# Patient Record
Sex: FEMALE | Race: OTHER | HISPANIC OR LATINO | Employment: OTHER | URBAN - METROPOLITAN AREA
[De-identification: names, ages, dates, MRNs, and addresses within clinical notes are randomized per-mention and may not be internally consistent; named-entity substitution may affect disease eponyms.]

---

## 2018-03-20 LAB
ALBUMIN SERPL BCP-MCNC: 4.7 G/DL (ref 3–5.2)
ALP SERPL-CCNC: 109 U/L (ref 43–122)
ALT SERPL W P-5'-P-CCNC: 32 U/L (ref 9–52)
ANION GAP SERPL CALCULATED.3IONS-SCNC: 13 MMOL/L (ref 5–14)
AST SERPL W P-5'-P-CCNC: 25 U/L (ref 14–36)
BILIRUB SERPL-MCNC: 0.5 MG/DL
BUN SERPL-MCNC: 24 MG/DL (ref 5–25)
CALCIUM SERPL-MCNC: 10 MG/DL (ref 8.4–10.2)
CHLORIDE SERPL-SCNC: 104 MEQ/L (ref 97–108)
CHOLEST SERPL-MCNC: 372 MG/DL
CHOLEST/HDLC SERPL: 7.9 {RATIO}
CO2 SERPL-SCNC: 25 MMOL/L (ref 22–30)
CREATINE, SERUM (HISTORICAL): 1.2 MG/DL (ref 0.6–1.2)
EGFR (HISTORICAL): 44 ML/MIN/1.73 M2
GLUCOSE FASTING (HISTORICAL): 117 MG/DL (ref 70–99)
HDLC SERPL-MCNC: 47 MG/DL
LDL/HDL RATIO (HISTORICAL): 6.2
LDLC SERPL CALC-MCNC: 290 MG/DL
POTASSIUM SERPL-SCNC: 4.9 MEQ/L (ref 3.6–5)
SODIUM SERPL-SCNC: 142 MEQ/L (ref 137–147)
TOTAL PROTEIN (HISTORICAL): 7.8 G/DL (ref 5.9–8.4)
TRIGL SERPL-MCNC: 175 MG/DL
TSH SERPL DL<=0.05 MIU/L-ACNC: 2.28 UIU/ML (ref 0.47–4.68)
VLDLC SERPL CALC-MCNC: 35 MG/DL (ref 0–40)

## 2018-08-12 ENCOUNTER — APPOINTMENT (EMERGENCY)
Dept: RADIOLOGY | Facility: HOSPITAL | Age: 78
End: 2018-08-12

## 2018-08-12 ENCOUNTER — HOSPITAL ENCOUNTER (EMERGENCY)
Facility: HOSPITAL | Age: 78
Discharge: HOME | End: 2018-08-13
Attending: EMERGENCY MEDICINE

## 2018-08-12 DIAGNOSIS — R10.9 ABDOMINAL PAIN: ICD-10-CM

## 2018-08-12 DIAGNOSIS — K42.9 UMBILICAL HERNIA: Primary | ICD-10-CM

## 2018-08-12 DIAGNOSIS — I10 HTN (HYPERTENSION): ICD-10-CM

## 2018-08-12 LAB
ALBUMIN SERPL BCP-MCNC: 4.2 G/DL (ref 3–5.2)
ALP SERPL-CCNC: 118 U/L (ref 43–122)
ALT SERPL W P-5'-P-CCNC: 28 U/L (ref 9–52)
ANION GAP SERPL CALCULATED.3IONS-SCNC: 10 MMOL/L (ref 5–14)
AST SERPL W P-5'-P-CCNC: 37 U/L (ref 14–36)
BILIRUB SERPL-MCNC: 0.3 MG/DL
BILIRUB UR QL STRIP: NEGATIVE
BUN SERPL-MCNC: 22 MG/DL (ref 5–25)
CALCIUM SERPL-MCNC: 9.5 MG/DL (ref 8.4–10.2)
CHLORIDE SERPL-SCNC: 104 MMOL/L (ref 97–108)
CLARITY UR: CLEAR
CO2 SERPL-SCNC: 25 MMOL/L (ref 22–30)
COLOR UR: NORMAL
CREAT SERPL-MCNC: 1.17 MG/DL (ref 0.6–1.2)
EOSINOPHIL # BLD AUTO: 0.2 THOUSAND/UL (ref 0–0.4)
EOSINOPHIL NFR BLD MANUAL: 2 % (ref 0–6)
ERYTHROCYTE [DISTWIDTH] IN BLOOD BY AUTOMATED COUNT: 14 %
GFR SERPL CREATININE-BSD FRML MDRD: 45 ML/MIN/1.73SQ M
GLUCOSE SERPL-MCNC: 101 MG/DL (ref 70–99)
GLUCOSE UR STRIP-MCNC: NEGATIVE MG/DL
HCT VFR BLD AUTO: 35.5 % (ref 36–46)
HGB BLD-MCNC: 12 G/DL (ref 12–16)
HGB UR QL STRIP.AUTO: NEGATIVE
KETONES UR STRIP-MCNC: NEGATIVE MG/DL
LEUKOCYTE ESTERASE UR QL STRIP: NEGATIVE
LIPASE SERPL-CCNC: 316 U/L (ref 23–300)
LYMPHOCYTES # BLD AUTO: 4.75 THOUSAND/UL (ref 0.5–4)
LYMPHOCYTES # BLD AUTO: 48 % (ref 20–50)
MCH RBC QN AUTO: 30.9 PG (ref 26–34)
MCHC RBC AUTO-ENTMCNC: 33.6 G/DL (ref 31–36)
MCV RBC AUTO: 92 FL (ref 80–100)
MONOCYTES # BLD AUTO: 0.2 THOUSAND/UL (ref 0.2–0.9)
MONOCYTES NFR BLD AUTO: 2 % (ref 1–10)
MYELOCYTES NFR BLD MANUAL: 1 % (ref 0–1)
NEUTS BAND NFR BLD MANUAL: 1 % (ref 0–8)
NEUTS SEG # BLD: 4.55 THOUSAND/UL (ref 1.8–7.8)
NEUTS SEG NFR BLD AUTO: 45 %
NITRITE UR QL STRIP: NEGATIVE
PH UR STRIP.AUTO: 5 [PH] (ref 4.5–8)
PLATELET # BLD AUTO: 336 THOUSANDS/UL (ref 150–450)
PLATELET BLD QL SMEAR: ADEQUATE
PMV BLD AUTO: 7.9 FL (ref 8.9–12.7)
POTASSIUM SERPL-SCNC: 4.9 MMOL/L (ref 3.6–5)
PROT SERPL-MCNC: 8 G/DL (ref 5.9–8.4)
PROT UR STRIP-MCNC: NEGATIVE MG/DL
RBC # BLD AUTO: 3.87 MILLION/UL (ref 4–5.2)
RBC MORPH BLD: NORMAL
SODIUM SERPL-SCNC: 139 MMOL/L (ref 137–147)
SP GR UR STRIP.AUTO: 1.01 (ref 1–1.04)
TOTAL CELLS COUNTED SPEC: 100
TROPONIN I SERPL-MCNC: <0.01 NG/ML (ref 0–0.03)
UROBILINOGEN UA: NEGATIVE MG/DL
VARIANT LYMPHS # BLD AUTO: 1 % (ref 0–0)
WBC # BLD AUTO: 9.9 THOUSAND/UL (ref 4.5–11)

## 2018-08-12 PROCEDURE — 71045 X-RAY EXAM CHEST 1 VIEW: CPT

## 2018-08-12 PROCEDURE — 96361 HYDRATE IV INFUSION ADD-ON: CPT

## 2018-08-12 PROCEDURE — 83690 ASSAY OF LIPASE: CPT | Performed by: PHYSICIAN ASSISTANT

## 2018-08-12 PROCEDURE — 85007 BL SMEAR W/DIFF WBC COUNT: CPT | Performed by: PHYSICIAN ASSISTANT

## 2018-08-12 PROCEDURE — 80053 COMPREHEN METABOLIC PANEL: CPT | Performed by: PHYSICIAN ASSISTANT

## 2018-08-12 PROCEDURE — 84484 ASSAY OF TROPONIN QUANT: CPT | Performed by: PHYSICIAN ASSISTANT

## 2018-08-12 PROCEDURE — 85027 COMPLETE CBC AUTOMATED: CPT | Performed by: PHYSICIAN ASSISTANT

## 2018-08-12 PROCEDURE — 93005 ELECTROCARDIOGRAM TRACING: CPT

## 2018-08-12 PROCEDURE — 36415 COLL VENOUS BLD VENIPUNCTURE: CPT | Performed by: PHYSICIAN ASSISTANT

## 2018-08-12 PROCEDURE — 81003 URINALYSIS AUTO W/O SCOPE: CPT | Performed by: PHYSICIAN ASSISTANT

## 2018-08-12 RX ORDER — ACETAMINOPHEN 325 MG/1
650 TABLET ORAL ONCE
Status: COMPLETED | OUTPATIENT
Start: 2018-08-12 | End: 2018-08-12

## 2018-08-12 RX ORDER — SODIUM CHLORIDE 9 MG/ML
100 INJECTION, SOLUTION INTRAVENOUS CONTINUOUS
Status: DISCONTINUED | OUTPATIENT
Start: 2018-08-12 | End: 2018-08-13 | Stop reason: HOSPADM

## 2018-08-12 RX ORDER — AMLODIPINE BESYLATE 10 MG/1
10 TABLET ORAL DAILY
COMMUNITY
End: 2019-10-11 | Stop reason: SDUPTHER

## 2018-08-12 RX ORDER — SERTRALINE HYDROCHLORIDE 25 MG/1
50 TABLET, FILM COATED ORAL DAILY
COMMUNITY
End: 2019-02-05

## 2018-08-12 RX ORDER — LEVOTHYROXINE SODIUM 0.03 MG/1
25 TABLET ORAL DAILY
COMMUNITY
End: 2019-02-05

## 2018-08-12 RX ORDER — ATORVASTATIN CALCIUM 40 MG/1
40 TABLET, FILM COATED ORAL DAILY
COMMUNITY
End: 2019-02-05

## 2018-08-12 RX ORDER — ASPIRIN 81 MG/1
81 TABLET, CHEWABLE ORAL DAILY
COMMUNITY
End: 2019-02-05

## 2018-08-12 RX ORDER — ACETAMINOPHEN 325 MG/1
TABLET ORAL
Status: DISCONTINUED
Start: 2018-08-12 | End: 2018-08-13 | Stop reason: HOSPADM

## 2018-08-12 RX ORDER — LISINOPRIL 20 MG/1
20 TABLET ORAL DAILY
COMMUNITY
End: 2019-02-05

## 2018-08-12 RX ORDER — FAMOTIDINE 20 MG/1
20 TABLET, FILM COATED ORAL DAILY
COMMUNITY
End: 2019-02-05

## 2018-08-12 RX ADMIN — ACETAMINOPHEN 650 MG: 325 TABLET ORAL at 22:18

## 2018-08-12 RX ADMIN — SODIUM CHLORIDE 100 ML/HR: 9 INJECTION, SOLUTION INTRAVENOUS at 22:13

## 2018-08-13 ENCOUNTER — APPOINTMENT (EMERGENCY)
Dept: CT IMAGING | Facility: HOSPITAL | Age: 78
End: 2018-08-13

## 2018-08-13 VITALS
DIASTOLIC BLOOD PRESSURE: 87 MMHG | TEMPERATURE: 97.8 F | OXYGEN SATURATION: 99 % | HEART RATE: 80 BPM | RESPIRATION RATE: 18 BRPM | WEIGHT: 147.93 LBS | SYSTOLIC BLOOD PRESSURE: 185 MMHG

## 2018-08-13 PROCEDURE — 74176 CT ABD & PELVIS W/O CONTRAST: CPT

## 2018-08-13 PROCEDURE — 96374 THER/PROPH/DIAG INJ IV PUSH: CPT

## 2018-08-13 PROCEDURE — 96361 HYDRATE IV INFUSION ADD-ON: CPT

## 2018-08-13 PROCEDURE — 99285 EMERGENCY DEPT VISIT HI MDM: CPT

## 2018-08-13 RX ORDER — FENTANYL CITRATE 50 UG/ML
25 INJECTION, SOLUTION INTRAMUSCULAR; INTRAVENOUS ONCE
Status: COMPLETED | OUTPATIENT
Start: 2018-08-13 | End: 2018-08-13

## 2018-08-13 RX ORDER — ACETAMINOPHEN AND CODEINE PHOSPHATE 300; 30 MG/1; MG/1
1-2 TABLET ORAL EVERY 6 HOURS PRN
Qty: 15 TABLET | Refills: 0 | Status: SHIPPED | OUTPATIENT
Start: 2018-08-13 | End: 2018-08-18

## 2018-08-13 RX ORDER — OXYCODONE HYDROCHLORIDE AND ACETAMINOPHEN 5; 325 MG/1; MG/1
TABLET ORAL
Status: DISCONTINUED
Start: 2018-08-13 | End: 2018-08-13 | Stop reason: HOSPADM

## 2018-08-13 RX ORDER — OXYCODONE HYDROCHLORIDE AND ACETAMINOPHEN 5; 325 MG/1; MG/1
1 TABLET ORAL ONCE
Status: COMPLETED | OUTPATIENT
Start: 2018-08-13 | End: 2018-08-13

## 2018-08-13 RX ORDER — FENTANYL CITRATE 50 UG/ML
INJECTION, SOLUTION INTRAMUSCULAR; INTRAVENOUS
Status: DISCONTINUED
Start: 2018-08-13 | End: 2018-08-13 | Stop reason: HOSPADM

## 2018-08-13 RX ADMIN — OXYCODONE HYDROCHLORIDE AND ACETAMINOPHEN 1 TABLET: 5; 325 TABLET ORAL at 01:16

## 2018-08-13 RX ADMIN — FENTANYL CITRATE 25 MCG: 50 INJECTION INTRAMUSCULAR; INTRAVENOUS at 00:23

## 2018-08-13 NOTE — DISCHARGE INSTRUCTIONS
Take medicine for pain every 6-8 hours  Call Dr Diego Roam 1st thing morning  If you develop any fevers, worsening abdominal pain or cannot tolerate p  o  call 911 or return to the ER  Also return if there are any other concerning symptoms  Make sure you take stool softeners while taking narcotic      Dolor abdominal   CUIDADO AMBULATORIO:   Dolor abdominal  puede ser sordo, molesto o lisha  Usted puede sentir dolor localizado en castro bon área del abdomen o en todo el abdomen  El dolor puede ser causado por castro afección kenneth estreñimiento, sensibilidad o intoxicación alimentaria, infección o castro obstrucción  Asimismo, el dolor abdominal puede deberse a castro hernia, apendicitis o [place]arnest  Las enfermedades del hígado, la vesícula o el riñón también pueden causar dolor abdominal  La causa del dolor abdominal puede ser desconocida  Busque atención médica de inmediato si:   · Usted comienza a sentir un dolor en el pecho o dificultad para respirar que antes no sentía  · Usted siente un dolor con pulsaciones en la parte superior del abdomen o en la parte inferior de la espalda que de repente se vuelve paulo  · El dolor se localiza en la parte inferior derecha del abdomen y [place] cuando se [place]  · Usted tiene fiebre por encima de los 100 4 °F (38 °C) o escalofríos  · Usted tiene vómitos y no puede retener líquidos ni alimentos en el estómago  · El dolor no mejora o empeora en las próximas 8 a 12 horas  · Usted nota robert en etienne vómito o heces, o éstas tienen un aspecto negruzco y alquitranado  · Etienne piel o las partes alisia de wilber ojos se vuelven amarillentas  · Si usted es castro yash y presenta abundante sangrado vaginal que no es etienne menstruación  Pregúntele a etienne Revere Savers vitaminas y minerales son adecuados para usted  · Usted siente dolor en la parte inferior de la espalda  · Usted es Vance Majors y tiene dolor en los testículos  · Siente dolor al [place]       · Usted tiene preguntas o inquietudes acerca de hawkins condición o cuidado  El tratamiento para el dolor abdominal  puede llegar a incluir medicamentos para calmar hawkins estómago, prevenir el vómito o disminuir el dolor  Acuda a gela consultas de control con hawkins médico según le indicaron  Anote gela preguntas para que se acuerde de hacerlas ashlie gela visitas  © 2017 [facility] Information is for End User's use only and may not be sold, redistributed or otherwise used for commercial purposes  All illustrations and images included in CareNotes® are the copyrighted property of A D A M , Inc  or Cooper Can  Esta información es sólo para uso en educación  Hawkins intención no es darle un consejo médico sobre enfermedades o tratamientos  Colsulte con hawkins Ochoa Cranker farmacéutico antes de seguir cualquier régimen médico para saber si es seguro y efectivo para usted  Dolor abdominal lisha   CUIDADO AMBULATORIO:   El dolor abdominal lisha  generalmente comienza de repente y empeora rápidamente  Busque atención médica de inmediato si:   · Usted no puede dejar de vomitar o vomita robert  · Usted tiene [employer] evacuaciones intestinales o estas tienen un aspecto alquitranado  · Usted tiene sangrado por hawkins recto  · El tamaño del abdomen es más poli de lo normal y se siente pooja y [place] doloroso  · Usted tiene dolor abdominal intenso  · Usted kenia de tener flatulencias y evacuaciones intestinales  · Usted se siente mareado, débil o tiene sensación de [place]  Pregúntele a hawkins Taylor Rumps vitaminas y minerales son adecuados para usted  · Usted tiene fiebre  · Tiene nuevos signos y síntomas  · Gela síntomas no mejoran con el tratamiento  · Usted tiene preguntas o inquietudes acerca de hawkins condición o cuidado    El tratamiento para el dolor abdominal lisha  podría depender de la causa del dolor abdominal  Es posible que usted necesite alguno de los siguientes:  · Rosanna, estos pueden administrarse para disminuir el dolor, tratar castro infección y [place] wilber síntomas, tales kenneth el estreñimiento  · Cirugía  puede necesitarse para tratar causas graves del dolor abdominal  Los ejemplos incluyen cirugías para tratar castro apendicitis o castro obstrucción en los intestinos  [place] de etienne síntomas:   · La aplicación de calor  sobre el abdomen de 20 a 30 minutos cada 2 horas por los [employer] indiquen  El calor ayuda a disminuir el dolor y los espasmos musculares  · Controle etienne estrés  El estrés puede causar dolor abdominal  Etienne médico puede recomendarle técnicas de relajación y ejercicios de respiración profunda para ayudar a disminuir el estrés  Etienne médico puede recomendarle que hable con alguien sobre etienne estrés o ansiedad, kenneth un consejero o un amigo de [place]  Duerma lo suficiente y realice ejercicio regularmente  · Limite o no consuma bebidas alcohólicas  El alcohol puede empeorar el dolor abdominal  Pregunte a etienne médico si usted puede leanne alcohol  Pregunte cuanto es la cantidad ham para usted leanne  · No fume  La nicotina y otros químicos en los cigarrillos pueden dañarle el esófago y [place]  Pida información a etienne médico si usted actualmente fuma y necesita ayuda para dejar de fumar  Los cigarrillos electrónicos o tabaco sin humo todavía contienen nicotina  Consulte con etienne médico antes de [employer]  Realice cambios en los alimentos que consume según se le indique:  No coma alimentos que causan dolor abdominal u otros síntomas  Ingiera comidas pequeñas, más a menudo  · Coma más alimentos ricos en fibra si tiene estreñimiento  Los alimentos altos en fibra incluyen frutas, verduras, alimentos de grano integral y legumbres  · No coma alimentos que causan gas si tiene distensión  Por ejemplo, brócoli, col y coliflor  No amy gaseosas o bebidas carbonadas, ya que también pueden provocarle gases       · No consuma alimentos o bebidas que contienen sorbitol o fructosa si tiene diarrea y distensión  Danita Savant son jugos de frutas, dulces, mermeladas y gomas de mascar sin azúcar  · No coma alimentos altos en grasas, kenneth comidas fritas, hamburguesas con queso, salchichas y postres  · Limite o no tome cafeína  La cafeína [employer], kenneth la acidez o las náuseas  · Roxanne suficientes líquidos para evitar la deshidratación causada por la diarrea o los vómitos  Pregunte a hawkins médico sobre la cantidad de líquido que necesita leanne todos los días y cuáles le recomienda  Acuda a wilber consultas de control con hawkins médico según le indicaron  Anote wilber preguntas para que se acuerde de hacerlas ashlie wilber visitas  © 2017 [address] Information is for End User's use only and may not be sold, redistributed or otherwise used for commercial purposes  All illustrations and images included in CareNotes® are the copyrighted property of A D A M , Inc  or Cooper Can  Esta información es sólo para uso en educación  Hawkins intención no es darle un consejo médico sobre enfermedades o tratamientos  Colsulte con hawkins [place] farmacéutico antes de seguir cualquier régimen médico para saber si es seguro y efectivo para usted  Hipertensión crónica   LO QUE NECESITA SABER:   La hipertensión es la presión arterial maylin  La presión arterial es la fuerza que ejerce la robert contra las tabares de las arterias  La presión arterial normal debería estar a menos de 120/80  La pre-hipertensión estaría entre 120/80 y 139/ 80  La presión arterial maylin estaría a 140/90 o más maylin  La hipertensión causa que hawkins presión arterial se eleve tanto que hawkins corazón se ve forzado a trabajar [employer] de lo normal  [place] puede dañar hawkins corazón  La hipertensión crónica es castro condición de maddi plazo que usted puede controlar con un estilo de femi franklin o con medicamentos   La presión controlada ayuda a proteger wilber Christiano Kaur etienne corazón, pulmones, cerebro, y riñones  INSTRUCCIONES SOBRE EL JAYLENE HOSPITALARIA:   Llame al 911 en mj de presentar lo siguiente:   · Usted tiene malestar en el pecho que se siente kenneth estrujamiento, presión, [place] Guy o dolor  · Usted se siente confundido o tiene dificultad para hablar  · Repentinamente se siente aturdido o con dificultad para respirar  · Usted tiene dolor o [employer] espalda, [place], [place], abdomen o Kendall Glow  Busque atención médica de inmediato si:   · Usted tiene un jorge dolor de mary o pérdida de la visión  · Usted tiene debilidad en un brazo o en castro pierna  Pregúntele a etienne Yady Emmet vitaminas y minerales son adecuados para usted  · Usted se siente mareado, confundido, somnoliento o kenneth si se fuera a desmayar  · Usted se ha tomado etienne medicamento para la presión arterial martha etienne presión arterial todavía está más jaylene de lo que le indicó etienne médico     · Usted tiene preguntas o inquietudes acerca de etienne condición o cuidado  Medicamentos:  Es posible que usted necesite alguno de los siguientes:  · Medicamento  podría usarse para ayudar a disminuir la presión arterial  Es posible que necesite más de un tipo de [place]  [place] el medicamento exactamente kenneth indicado  · Diuréticos  ayudan a eliminar el exceso de líquido que se acumula en el organismo  Redan contribuirá a bajar etienne presión arterial  Es posible que orine más seguido mientras jorge l paulette medicamento  · Los medicamentos para el colesterol  ayudan a bajar los niveles de [place]  Un nivel bajo de colesterol ayuda a prevenir enfermedades cardíacas y facilita el control de la presión arterial      · [place] wilber medicamentos kenneth se le haya indicado  Consulte con etienne médico si usted ayden que etienne medicamento no le está ayudando o si presenta efectos secundarios  Infórmele si es alérgico a cualquier medicamento   Mantenga castro lista actualizada de los [place], las vitaminas y los productos herbales que jorge l  Incluya los siguientes datos de los medicamentos: cantidad, frecuencia y motivo de administración  Traiga con usted la lista o los envases de la píldoras a wilber citas de seguimiento  Lleve la lista de los medicamentos con usted en mj de castro emergencia  Acuda a wilber consultas de control con etienne médico según le indicaron  Usted necesitará regresar para medir etienne presión arterial y realizar otros exámenes de laboratorio  Anote wilber preguntas para que se acuerde de hacerlas ashlie wilber visitas  Controle la hipertensión crónica:  Hable con etienne médico sobre las siguientes recomendaciones y otras formas de controlar la hipertensión:  · Tómese la presión arterial en etienne casa  Siéntese y descanse por 5 minutos antes de tomarse la presión arterial  Extienda etienne brazo y apóyelo en castro superficie plana  Etienne brazo debe estar a la misma altura que etienne corazón  Siga las instrucciones que vienen con el monitor para la presión arterial o tensiómetro  Si es posible tome por lo menos 2 lecturas de la presión cada vez  Tómese la presión arterial por lo [employer] al día a la misma hora todos los días, castro en la mañana y la otra en la noche  Mantenga un registro de las lecturas de etienne presión arterial y llévelo consigo a wilber consultas  Pregúntele a etienne médico cuál debería ser etienne presión arterial            · Limite el sodio (la sal) kenneth se le haya indicado  Demasiado sodio puede afectar el equilibrio de líquidos  Revise las etiquetas para buscar alimentos bajos en sodio o sin sal agregada  Algunos alimentos bajos en sodio utilizan sales de potasio para añadir sabor  Demasiado potasio también puede causar problemas de [place]  Etienne médico le dirá qué cantidad de sodio y potasio es ham para el consumo en un día  Él puede recomendarle que limite el sodio a 2,300 mg al día             · Siga el plan de comidas recomendado por etienne médico   Un dietista o médico puede darle más información sobre planes de bajo contenido de sodio o [place] plan de alimentación DASH (enfoques dietéticos para detener la hipertensión)  El plan DASH es bajo en sodio, grasas saturadas y grasa total  Es alto en potasio, calcio y Kacy  · Ejercítese para mantener un peso saludable  Realice actividad física por lo menos 30 minutos al día, la mayoría de los días de la [place]  [place] ayudará a bajar hawkins presión arterial  Pida más información acerca de un plan de ejercicio adecuado para usted  · [facility] estrés  [place] podría ayudarlo a bajar hawkins presión arterial  Aprenda sobre formas de relajarse, kenneth respiración profunda o escuchar música  · Limite el consumo de alcohol  Las mujeres deberían limitar el consumo de alcohol a 1 bebida por día  Los hombres deberían limitar el consumo de alcohol a 2 tragos al día  Un trago equivale a 12 onzas de cerveza, 5 onzas de vino o 1 onza y ½ de licor  · No fume  La nicotina y otros químicos en los cigarrillos y cigarros pueden aumentar hawkins presión arterial y también pueden provocar daño al pulmón  Pida información a hawkins médico si usted actualmente fuma y necesita ayuda para dejar de fumar  Los cigarrillos electrónicos o tabaco sin humo todavía contienen nicotina  Consulte con hawkins médico antes de [employer]  © 2017 [facility] Information is for End User's use only and may not be sold, redistributed or otherwise used for commercial purposes  All illustrations and images included in CareNotes® are the copyrighted property of A D A M , Inc  or Cooper Can  Esta información es sólo para uso en educación  Hawkins intención no es darle un consejo médico sobre enfermedades o tratamientos  Colsulte con hawkins Ochoa Cranker farmacéutico antes de seguir cualquier régimen médico para saber si es seguro y efectivo para usted  Umbilical Hernia   WHAT YOU NEED TO KNOW:   An umbilical hernia is a bulge through the abdominal muscles in the area of the navel (belly button)   The hernia may contain tissue from the abdomen, part of an organ (such as the intestine), or fluid  Umbilical hernias usually happen because of a hole or a weak area in the muscles of the abdominal wall  DISCHARGE INSTRUCTIONS:   Medicines:   · Ibuprofen or acetaminophen:  These medicines decrease pain  They are available without a doctor's order  Ask your healthcare provider which medicine is right for you  Ask how much to take and how often to take it  Follow directions  These medicines can cause stomach bleeding if not taken correctly  Ibuprofen can cause kidney damage  Do not take ibuprofen if you have kidney disease, an ulcer, or allergies to aspirin  Acetaminophen can cause liver damage  Do not drink alcohol if you take acetaminophen  · Take your medicine as directed  Contact your healthcare provider if you think your medicine is not helping or if you have side effects  Tell him or her if you are allergic to any medicine  Keep a list of the medicines, vitamins, and herbs you take  Include the amounts, and when and why you take them  Bring the list or the pill bottles to follow-up visits  Carry your medicine list with you in case of an emergency  Follow up with your healthcare provider as directed:  Write down your questions so you remember to ask them during your visits  Self care:   · Activity:  Avoid lifting, bending, and straining  Try not to stand for long periods of time  If you have to cough, try to cough gently  Support the hernia by holding your hand or a pillow over it when coughing  Ask your if it is okay for you to have sexual intercourse  · Prevent constipation:  Straining to have a bowel movement may make your hernia worse  Eat foods that are high in fiber and drink at least 8 glasses of water a day  A high-fiber diet includes whole grains, bran, cereals, and uncooked fruit and vegetables  Walking or other exercise can also help   Your healthcare provider may give you fiber medicine or a stool softener to help make your bowel movements softer and more regular  Do not use an enema or a laxative unless your healthcare provider says it is okay  · What to wear:  Do not wear anything tight over your hernia  Ask your healthcare provider if you should wear a support belt or girdle to keep the hernia in place  · Ask your healthcare provider how to reduce your hernia:  Sometimes your hernia will slip back into your abdomen if you lie flat for a while  Ask your healthcare provider if you should try to gently push your hernia back into place if lying flat does not work  If your hernia does not slide back into your abdomen easily, stop pushing on it and call your healthcare provider  Do not try to push the hernia back into place if it is painful or tender  Contact your healthcare provider if:   · You have nausea or vomiting  · You cannot gently push your hernia back into your abdomen  (Do this only if your healthcare provider has shown you how to do it )     · You are constipated or have blood in your bowel movements  · Your hernia is getting bigger  · You have questions or concerns about your condition or care  Seek care immediately or call 911 if:   · You have a fever  · Your hernia is stuck outside your abdomen and is painful, swollen, or feels hard  · You completely stop having bowel movements and stop passing gas  · Your abdominal pain is bad or getting worse  © 2017 [address] Information is for End User's use only and may not be sold, redistributed or otherwise used for commercial purposes  All illustrations and images included in CareNotes® are the copyrighted property of A D A M , Inc  or Cooper Can  The above information is an  only  It is not intended as medical advice for individual conditions or treatments  Talk to your doctor, nurse or pharmacist before following any medical regimen to see if it is safe and effective for you

## 2018-08-13 NOTE — ED PROVIDER NOTES
Patient is a 16-year-old female signed out to me by Marleny blankenship pending CT  Patient was coming in with left-sided back pain as well as left lower quadrant pain  Reviewed labs as well as urine which are stable  On my exam patient has tenderness to the left lower quadrant  She has no rebound or guarding  She has no peritoneal signs  She is alert oriented x3, Croatian-speaking only  Maintaining airway maintaining secretions  Patient moves upper and lower extremities independently  12:49 AM  On exam patient does have tenderness to the umbilical region  No identified protruding hernia  Use of  in room patient agreeable  Even after fentanyl patient with continued mild pain  She never had hernia surgery repair  Will discuss with surgeon on-call  12:51 AM  Discussed with Dr Virgen Li and reviewed CT, labs, and physical exam   He states that there is nothing there on exam sides tenderness she can follow up as an outpatient    Patient and family updated in room  Will give 1st dose of pain medications here  They are instructed to call 1st thing in the morning for follow-up  Return to ER instructions given as well  Patient does have elevated blood pressures which she states she has been taking her medications for  She has no evidence of end-organ damage, neuro intact  Will discharge home with return to ER instructions follow up as well as follow up with PCP     1:20 AM  Patient believe she was having surgery tomorrow  Rediscussed with patient and friend who translated, she is not having surgery tomorrow she has to call for an appointment for an evaluation with the surgeon I spoke with  Patient is able to speak in full sentences in no pain  Her abdomen is soft nondistended she does have mild tenderness to the umbilical region; however, is non peritoneal at this time  Patient is alert oriented x3, no fevers, leukocytosis  No peritoneal signs   BP improved     Portions of the record may have been created with voice recognition software  Occasional wrong word or "sound a like" substitutions may have occurred due to the inherent limitations of voice recognition software  Read the chart carefully and recognize, using context, where substitutions have occurred         [address] Dr John Morris, DO  08/13/18 1684

## 2018-08-13 NOTE — ED NOTES
Pt  C/o continuing pain and ELÍAS Ruiz PA-C made aware of same     Riana Record, CHINYERE  08/12/18 1056

## 2018-08-13 NOTE — ED PROVIDER NOTES
History  Chief Complaint   Patient presents with    Abdominal Pain     Abdominal and back pain for a week  History provided by:  Patient   used: Yes    Medical Problem   Location:  Pt with 1 week left lower quadrant and left flank pain and left back pain   Severity:  Moderate  Onset quality:  Gradual  Duration:  1 week  Timing:  Constant  Progression:  Unchanged  Chronicity:  New  Associated symptoms: abdominal pain    Associated symptoms: no chest pain, no congestion, no cough, no diarrhea, no ear pain, no fatigue, no fever, no headaches, no loss of consciousness, no myalgias, no nausea, no rash, no rhinorrhea, no shortness of breath, no sore throat, no vomiting and no wheezing        Prior to Admission Medications   Prescriptions Last Dose Informant Patient Reported? Taking? amLODIPine (NORVASC) 10 mg tablet   Yes Yes   Sig: Take 10 mg by mouth daily   aspirin 81 mg chewable tablet   Yes Yes   Sig: Chew 81 mg daily   atorvastatin (LIPITOR) 40 mg tablet   Yes Yes   Sig: Take 40 mg by mouth daily   famotidine (PEPCID) 20 mg tablet   Yes Yes   Sig: Take 20 mg by mouth daily   levothyroxine 25 mcg tablet   Yes Yes   Sig: Take 25 mcg by mouth daily   lisinopril (ZESTRIL) 20 mg tablet   Yes Yes   Sig: Take 20 mg by mouth daily   metFORMIN (GLUCOPHAGE) 500 mg tablet   Yes Yes   Sig: Take 500 mg by mouth 2 (two) times a day with meals   sertraline (ZOLOFT) 25 mg tablet   Yes Yes   Sig: Take 50 mg by mouth daily      Facility-Administered Medications: None       Past Medical History:   Diagnosis Date    Heart abnormality     Hypertension     Renal disorder        Past Surgical History:   Procedure Laterality Date     SECTION         History reviewed  No pertinent family history  I have reviewed and agree with the history as documented      Social History   Substance Use Topics    Smoking status: Never Smoker    Smokeless tobacco: Never Used    Alcohol use No        Review of Systems   Constitutional: Negative  Negative for fatigue and fever  HENT: Negative  Negative for congestion, ear pain, rhinorrhea and sore throat  Eyes: Negative  Respiratory: Negative  Negative for cough, shortness of breath and wheezing  Cardiovascular: Negative  Negative for chest pain  Gastrointestinal: Positive for abdominal pain  Negative for diarrhea, nausea and vomiting  Endocrine: Negative  Genitourinary: Negative  Musculoskeletal: Negative  Negative for myalgias  Skin: Negative  Negative for rash  Allergic/Immunologic: Negative  Neurological: Negative  Negative for loss of consciousness and headaches  Hematological: Negative  Psychiatric/Behavioral: Negative  All other systems reviewed and are negative  Physical Exam  Physical Exam   Constitutional: She appears well-developed and well-nourished  HENT:   Head: Normocephalic and atraumatic  Right Ear: External ear normal    Left Ear: External ear normal    Nose: Nose normal    Mouth/Throat: Oropharynx is clear and moist    Eyes: Conjunctivae and EOM are normal  Pupils are equal, round, and reactive to light  Neck: Normal range of motion  Neck supple  Cardiovascular: Normal rate, regular rhythm and normal heart sounds  Pulmonary/Chest: Effort normal and breath sounds normal    Abdominal: Soft  Bowel sounds are normal    llq and left flank pain    No grding no rbd      Genitourinary: Vagina normal and uterus normal    Musculoskeletal: Normal range of motion  Left and right upper  to palp    Neurological: She is alert  Skin: Skin is warm  Psychiatric: She has a normal mood and affect  Her behavior is normal  Judgment and thought content normal    Nursing note and vitals reviewed        Vital Signs  ED Triage Vitals   Temperature Pulse Respirations Blood Pressure SpO2   08/12/18 2139 08/12/18 2139 08/12/18 2139 08/12/18 2139 08/12/18 2139   97 8 °F (36 6 °C) 64 20 (!) 218/100 95 % Temp Source Heart Rate Source Patient Position - Orthostatic VS BP Location FiO2 (%)   08/12/18 2139 08/12/18 2139 08/12/18 2139 08/12/18 2139 --   Temporal Monitor Sitting Right arm       Pain Score       08/12/18 2204       Worst Possible Pain           Vitals:    08/12/18 2204 08/12/18 2205 08/12/18 2230 08/12/18 2300   BP: (!) 190/106 (!) 197/90 167/87 (!) 198/88   Pulse: 58 57 56 58   Patient Position - Orthostatic VS: Lying Lying Lying Lying       Visual Acuity      ED Medications  Medications   sodium chloride 0 9 % infusion (100 mL/hr Intravenous New Bag 8/12/18 2213)   acetaminophen (TYLENOL) tablet 650 mg (650 mg Oral Given 8/12/18 2218)       Diagnostic Studies  Results Reviewed     Procedure Component Value Units Date/Time    Troponin I [[id number]]  (Normal) Collected:  08/12/18 2214    Lab Status:  Final result Specimen:  Blood from Arm, Right Updated:  08/12/18 2257     Troponin I <0 01 ng/mL     Lipase [[id number]]  (Abnormal) Collected:  08/12/18 2214    Lab Status:  Final result Specimen:  Blood from Arm, Right Updated:  08/12/18 2250     Lipase 316 (H) u/L     Comprehensive metabolic panel [[id number]]  (Abnormal) Collected:  08/12/18 2214    Lab Status:  Final result Specimen:  Blood from Arm, Right Updated:  08/12/18 2250     Sodium 139 mmol/L      Potassium 4 9 mmol/L      Chloride 104 mmol/L      CO2 25 mmol/L      Anion Gap 10 mmol/L      BUN 22 mg/dL      Creatinine 1 17 mg/dL      Glucose 101 (H) mg/dL      Calcium 9 5 mg/dL      AST 37 (H) U/L      ALT 28 U/L      Alkaline Phosphatase 118 U/L      Total Protein 8 0 g/dL      Albumin 4 2 g/dL      Total Bilirubin 0 30 mg/dL      eGFR 45 (L) ml/min/1 73sq m     Narrative:         National Kidney Disease Education Program recommendations are as follows:  GFR calculation is accurate only with a steady state creatinine  Chronic Kidney disease less than 60 ml/min/1 73 sq  meters  Kidney failure less than 15 ml/min/1 73 sq  meters      UA w Reflex to Microscopic w Reflex to Culture [[id number]]  (Normal) Collected:  08/12/18 2214    Lab Status:  Final result Specimen:  Urine from Urine, Clean Catch Updated:  08/12/18 2244     Color, UA Straw     Clarity, UA Clear     Specific Gravity, UA 1 010     pH, UA 5 0     Leukocytes, UA Negative     Nitrite, UA Negative     Protein, UA Negative mg/dl      Glucose, UA Negative mg/dl      Ketones, UA Negative mg/dl      Bilirubin, UA Negative     Blood, UA Negative     UROBILINOGEN UA Negative mg/dL     CBC and differential [[id number]]  (Abnormal) Collected:  08/12/18 2214    Lab Status:  Final result Specimen:  Blood from Arm, Right Updated:  08/12/18 2239     WBC 9 90 Thousand/uL      RBC 3 87 (L) Million/uL      Hemoglobin 12 0 g/dL      Hematocrit 35 5 (L) %      MCV 92 fL      MCH 30 9 pg      MCHC 33 6 g/dL      RDW 14 0 %      MPV 7 9 (L) fL      Platelets 626 Thousands/uL                  XR chest 1 view portable    (Results Pending)   CT abdomen pelvis wo contrast    (Results Pending)              Procedures  Procedures       Phone Contacts  ED Phone Contact    ED Course  ED Course as of Aug 12 2350   Sun Aug 12, 2018   2311 Creatinine: 1 17                               MDM  CritCare Time    Disposition  Final diagnoses:   None     ED Disposition     None      Follow-up Information    None         Patient's Medications   Discharge Prescriptions    No medications on file     No discharge procedures on file      ED Provider  Electronically Signed by           Tabatha Harmon PA-C  08/12/18 3732

## 2018-08-14 ENCOUNTER — HOSPITAL ENCOUNTER (EMERGENCY)
Facility: HOSPITAL | Age: 78
Discharge: HOME | End: 2018-08-14
Attending: EMERGENCY MEDICINE | Admitting: EMERGENCY MEDICINE

## 2018-08-14 VITALS
SYSTOLIC BLOOD PRESSURE: 186 MMHG | OXYGEN SATURATION: 95 % | HEART RATE: 64 BPM | TEMPERATURE: 97.2 F | RESPIRATION RATE: 19 BRPM | DIASTOLIC BLOOD PRESSURE: 81 MMHG | WEIGHT: 144.18 LBS

## 2018-08-14 DIAGNOSIS — M54.16 LUMBAR RADICULOPATHY, ACUTE: Primary | ICD-10-CM

## 2018-08-14 LAB
ALBUMIN SERPL BCP-MCNC: 4.1 G/DL (ref 3–5.2)
ALP SERPL-CCNC: 110 U/L (ref 43–122)
ALT SERPL W P-5'-P-CCNC: 31 U/L (ref 9–52)
ANION GAP SERPL CALCULATED.3IONS-SCNC: 10 MMOL/L (ref 5–14)
AST SERPL W P-5'-P-CCNC: 30 U/L (ref 14–36)
ATRIAL RATE: 55 BPM
BACTERIA UR QL AUTO: ABNORMAL /HPF
BASOPHILS # BLD AUTO: 0 THOUSANDS/ΜL (ref 0–0.1)
BASOPHILS NFR BLD AUTO: 1 % (ref 0–1)
BILIRUB SERPL-MCNC: 0.5 MG/DL
BILIRUB UR QL STRIP: NEGATIVE
BUN SERPL-MCNC: 20 MG/DL (ref 5–25)
CALCIUM SERPL-MCNC: 9.3 MG/DL (ref 8.4–10.2)
CHLORIDE SERPL-SCNC: 104 MMOL/L (ref 97–108)
CLARITY UR: CLEAR
CO2 SERPL-SCNC: 25 MMOL/L (ref 22–30)
COLOR UR: ABNORMAL
CREAT SERPL-MCNC: 1.18 MG/DL (ref 0.6–1.2)
EOSINOPHIL # BLD AUTO: 0.2 THOUSAND/ΜL (ref 0–0.4)
EOSINOPHIL NFR BLD AUTO: 3 % (ref 0–6)
ERYTHROCYTE [DISTWIDTH] IN BLOOD BY AUTOMATED COUNT: 13.8 %
GFR SERPL CREATININE-BSD FRML MDRD: 45 ML/MIN/1.73SQ M
GLUCOSE SERPL-MCNC: 103 MG/DL (ref 70–99)
GLUCOSE UR STRIP-MCNC: NEGATIVE MG/DL
HCT VFR BLD AUTO: 36.6 % (ref 36–46)
HGB BLD-MCNC: 12.2 G/DL (ref 12–16)
HGB UR QL STRIP.AUTO: NEGATIVE
KETONES UR STRIP-MCNC: NEGATIVE MG/DL
LEUKOCYTE ESTERASE UR QL STRIP: NEGATIVE
LIPASE SERPL-CCNC: 145 U/L (ref 23–300)
LYMPHOCYTES # BLD AUTO: 2.5 THOUSANDS/ΜL (ref 0.5–4)
LYMPHOCYTES NFR BLD AUTO: 37 % (ref 20–50)
MCH RBC QN AUTO: 30.8 PG (ref 26–34)
MCHC RBC AUTO-ENTMCNC: 33.3 G/DL (ref 31–36)
MCV RBC AUTO: 93 FL (ref 80–100)
MONOCYTES # BLD AUTO: 0.5 THOUSAND/ΜL (ref 0.2–0.9)
MONOCYTES NFR BLD AUTO: 7 % (ref 1–10)
NEUTROPHILS # BLD AUTO: 3.6 THOUSANDS/ΜL (ref 1.8–7.8)
NEUTS SEG NFR BLD AUTO: 52 % (ref 45–65)
NITRITE UR QL STRIP: NEGATIVE
NON-SQ EPI CELLS URNS QL MICRO: ABNORMAL /HPF
P AXIS: 41 DEGREES
PH UR STRIP.AUTO: 6 [PH] (ref 4.5–8)
PLATELET # BLD AUTO: 325 THOUSANDS/UL (ref 150–450)
PMV BLD AUTO: 7.6 FL (ref 8.9–12.7)
POTASSIUM SERPL-SCNC: 4.9 MMOL/L (ref 3.6–5)
PR INTERVAL: 196 MS
PROT SERPL-MCNC: 7.8 G/DL (ref 5.9–8.4)
PROT UR STRIP-MCNC: ABNORMAL MG/DL
QRS AXIS: -14 DEGREES
QRSD INTERVAL: 80 MS
QT INTERVAL: 422 MS
QTC INTERVAL: 403 MS
RBC # BLD AUTO: 3.95 MILLION/UL (ref 4–5.2)
RBC #/AREA URNS AUTO: ABNORMAL /HPF
SODIUM SERPL-SCNC: 139 MMOL/L (ref 137–147)
SP GR UR STRIP.AUTO: 1.01 (ref 1–1.04)
T WAVE AXIS: 36 DEGREES
UROBILINOGEN UA: NEGATIVE MG/DL
VENTRICULAR RATE: 55 BPM
WBC # BLD AUTO: 6.9 THOUSAND/UL (ref 4.5–11)
WBC #/AREA URNS AUTO: ABNORMAL /HPF

## 2018-08-14 PROCEDURE — 85025 COMPLETE CBC W/AUTO DIFF WBC: CPT | Performed by: EMERGENCY MEDICINE

## 2018-08-14 PROCEDURE — 99284 EMERGENCY DEPT VISIT MOD MDM: CPT

## 2018-08-14 PROCEDURE — 81001 URINALYSIS AUTO W/SCOPE: CPT | Performed by: EMERGENCY MEDICINE

## 2018-08-14 PROCEDURE — 80053 COMPREHEN METABOLIC PANEL: CPT | Performed by: EMERGENCY MEDICINE

## 2018-08-14 PROCEDURE — 83690 ASSAY OF LIPASE: CPT | Performed by: EMERGENCY MEDICINE

## 2018-08-14 PROCEDURE — 36415 COLL VENOUS BLD VENIPUNCTURE: CPT | Performed by: EMERGENCY MEDICINE

## 2018-08-14 PROCEDURE — 93010 ELECTROCARDIOGRAM REPORT: CPT | Performed by: INTERNAL MEDICINE

## 2018-08-14 RX ORDER — IBUPROFEN 400 MG/1
400 TABLET ORAL EVERY 8 HOURS PRN
Qty: 15 TABLET | Refills: 0 | Status: SHIPPED | OUTPATIENT
Start: 2018-08-14 | End: 2019-02-05

## 2018-08-14 NOTE — ED PROVIDER NOTES
History  Chief Complaint   Patient presents with    Abdominal Pain     "she came on Sunday but the pain in the pain in the stomach and they sent her for a scan and they say that she have a hernia but today she say that the pain is really, really bad"     This is a 65-year-old female presents emergency department with abdominal pain and left flank pain  She was seen 2 days ago and was advised that she has a hernia  Patient continue with pain  Upon discussion with patient she describes pain is predominantly left low back and radiates down left leg as well as wrapping around to the anterior aspect of her left thigh  It is worse with movement  No dysuria frequency urgency  The patient has not had fevers or chills  No nausea or vomiting  No diarrhea  No rash on the skin  Symptoms are moderate in severity  No loss of bowel or bladder function  No weakness or numbness  No other associated factors  Differential diagnosis includes UTI, sciatica, lumbar radiculopathy  Prior to Admission Medications   Prescriptions Last Dose Informant Patient Reported?  Taking?   acetaminophen-codeine (TYLENOL #3) 300-30 mg per tablet   No No   Sig: Take 1-2 tablets by mouth every 6 (six) hours as needed for moderate pain for up to 5 days   amLODIPine (NORVASC) 10 mg tablet   Yes No   Sig: Take 10 mg by mouth daily   aspirin 81 mg chewable tablet   Yes No   Sig: Chew 81 mg daily   atorvastatin (LIPITOR) 40 mg tablet   Yes No   Sig: Take 40 mg by mouth daily   famotidine (PEPCID) 20 mg tablet   Yes No   Sig: Take 20 mg by mouth daily   levothyroxine 25 mcg tablet   Yes No   Sig: Take 25 mcg by mouth daily   lisinopril (ZESTRIL) 20 mg tablet   Yes No   Sig: Take 20 mg by mouth daily   metFORMIN (GLUCOPHAGE) 500 mg tablet   Yes No   Sig: Take 500 mg by mouth 2 (two) times a day with meals   sertraline (ZOLOFT) 25 mg tablet   Yes No   Sig: Take 50 mg by mouth daily      Facility-Administered Medications: None       Past Medical History:   Diagnosis Date    Heart abnormality     Hypertension     Renal disorder        Past Surgical History:   Procedure Laterality Date     SECTION         History reviewed  No pertinent family history  I have reviewed and agree with the history as documented  Social History   Substance Use Topics    Smoking status: Never Smoker    Smokeless tobacco: Never Used    Alcohol use No        Review of Systems   Constitutional: Negative for activity change, appetite change, chills and fever  HENT: Negative for congestion, ear pain, rhinorrhea and sore throat  Eyes: Negative for pain, discharge and redness  Respiratory: Negative for cough, shortness of breath and wheezing  Cardiovascular: Negative for chest pain and palpitations  Gastrointestinal: Positive for abdominal pain  Negative for diarrhea, nausea and vomiting  Endocrine: Negative for polyuria  Genitourinary: Negative for difficulty urinating, dysuria, frequency and urgency  Musculoskeletal: Positive for back pain  Negative for arthralgias, gait problem, myalgias and neck stiffness  Skin: Negative for color change and rash  Allergic/Immunologic: Negative for immunocompromised state  Neurological: Negative for dizziness, syncope, weakness, light-headedness and numbness  Hematological: Does not bruise/bleed easily  Psychiatric/Behavioral: Negative for confusion  All other systems reviewed and are negative  Physical Exam  Physical Exam   Constitutional: She is oriented to person, place, and time  She appears well-developed  No distress  HENT:   Head: Normocephalic and atraumatic  Nose: Nose normal    Eyes: Conjunctivae are normal  No scleral icterus  Neck: Normal range of motion  Neck supple  Cardiovascular: Normal rate, regular rhythm, normal heart sounds and intact distal pulses  Pulmonary/Chest: Effort normal and breath sounds normal  No stridor  No respiratory distress   She has no wheezes  Abdominal: Soft  She exhibits no distension  There is tenderness (Very slight tenderness overThe umbilicus  There is no palpable hernia  There is no erythema  )  There is no rebound and no guarding  Musculoskeletal: Normal range of motion  She exhibits tenderness (left lumbar paraspinous area  )  She exhibits no edema or deformity  5/5 strength b/l plantar/dorsi flexion, normal b/l knee flexion and extension, normal b/l hip flexion against gravity  Normal sensation b/l lower extremities  Neurological: She is alert and oriented to person, place, and time  No cranial nerve deficit  She exhibits normal muscle tone  Coordination normal    Bilateral lower extremities with 5/5 strength  Skin: Skin is warm and dry  No rash noted  Psychiatric: She has a normal mood and affect  Thought content normal    Nursing note and vitals reviewed        Vital Signs  ED Triage Vitals [08/14/18 0931]   Temperature Pulse Respirations Blood Pressure SpO2   (!) 97 2 °F (36 2 °C) 64 19 (!) 186/81 95 %      Temp Source Heart Rate Source Patient Position - Orthostatic VS BP Location FiO2 (%)   Temporal Monitor Sitting Left arm --      Pain Score       --           Vitals:    08/14/18 0931   BP: (!) 186/81   Pulse: 64   Patient Position - Orthostatic VS: Sitting       Visual Acuity      ED Medications  Medications - No data to display    Diagnostic Studies  Results Reviewed     Procedure Component Value Units Date/Time    Lipase [[id number]]  (Normal) Collected:  08/14/18 1042    Lab Status:  Final result Specimen:  Blood from Arm, Right Updated:  08/14/18 1113     Lipase 145 u/L     Comprehensive metabolic panel [[id number]]  (Abnormal) Collected:  08/14/18 1042    Lab Status:  Final result Specimen:  Blood from Arm, Right Updated:  08/14/18 1113     Sodium 139 mmol/L      Potassium 4 9 mmol/L      Chloride 104 mmol/L      CO2 25 mmol/L      Anion Gap 10 mmol/L      BUN 20 mg/dL      Creatinine 1 18 mg/dL      Glucose 103 (H) mg/dL      Calcium 9 3 mg/dL      AST 30 U/L      ALT 31 U/L      Alkaline Phosphatase 110 U/L      Total Protein 7 8 g/dL      Albumin 4 1 g/dL      Total Bilirubin 0 50 mg/dL      eGFR 45 (L) ml/min/1 73sq m     Narrative:         National Kidney Disease Education Program recommendations are as follows:  GFR calculation is accurate only with a steady state creatinine  Chronic Kidney disease less than 60 ml/min/1 73 sq  meters  Kidney failure less than 15 ml/min/1 73 sq  meters      CBC and differential [[id number]]  (Abnormal) Collected:  08/14/18 1042    Lab Status:  Final result Specimen:  Blood from Arm, Right Updated:  08/14/18 1053     WBC 6 90 Thousand/uL      RBC 3 95 (L) Million/uL      Hemoglobin 12 2 g/dL      Hematocrit 36 6 %      MCV 93 fL      MCH 30 8 pg      MCHC 33 3 g/dL      RDW 13 8 %      MPV 7 6 (L) fL      Platelets 111 Thousands/uL      Neutrophils Relative 52 %      Lymphocytes Relative 37 %      Monocytes Relative 7 %      Eosinophils Relative 3 %      Basophils Relative 1 %      Neutrophils Absolute 3 60 Thousands/µL      Lymphocytes Absolute 2 50 Thousands/µL      Monocytes Absolute 0 50 Thousand/µL      Eosinophils Absolute 0 20 Thousand/µL      Basophils Absolute 0 00 Thousands/µL     Urine Microscopic [[id number]]  (Abnormal) Collected:  08/14/18 1006    Lab Status:  Final result Specimen:  Urine from Urine, Clean Catch Updated:  08/14/18 1049     RBC, UA None Seen /hpf      WBC, UA 0-1 (A) /hpf      Epithelial Cells Occasional /hpf      Bacteria, UA None Seen /hpf     UA w Reflex to Microscopic [[id number]]  (Abnormal) Collected:  08/14/18 1006    Lab Status:  Final result Specimen:  Urine from Urine, Clean Catch Updated:  08/14/18 1040     Color, UA Straw     Clarity, UA Clear     Specific Gravity, UA 1 010     pH, UA 6 0     Leukocytes, UA Negative     Nitrite, UA Negative     Protein, UA 15 (Trace) (A) mg/dl      Glucose, UA Negative mg/dl      Ketones, UA Negative mg/dl      Bilirubin, UA Negative     Blood, UA Negative     UROBILINOGEN UA Negative mg/dL                  No orders to display              Procedures  Procedures       Phone Contacts  ED Phone Contact    ED Course                               MDM  Number of Diagnoses or Management Options  Lumbar radiculopathy, acute:   Diagnosis management comments:   Review of CT scan 2 days ago shows the following:FINDINGS:     ABDOMEN     LOWER CHEST:  No clinically significant abnormality identified in the visualized lower chest      LIVER/BILIARY TREE:  Unremarkable      GALLBLADDER:  No calcified gallstones  No pericholecystic inflammatory change      SPLEEN:  Unremarkable      PANCREAS:  Unremarkable      ADRENAL GLANDS:  Left-sided adrenal adenoma is seen      KIDNEYS/URETERS:  Unremarkable  No hydronephrosis      STOMACH AND BOWEL:  Large amount of fecal material are seen within the colon  Small hiatal hernia is noted      APPENDIX:  No findings to suggest appendicitis      ABDOMINOPELVIC CAVITY:  No ascites or free intraperitoneal air  No lymphadenopathy      VESSELS:  Unremarkable for patient's age      PELVIS     REPRODUCTIVE ORGANS:  Calcification in the right adnexa is seen      URINARY BLADDER:  The urinary bladder is markedly distended      ABDOMINAL WALL/INGUINAL REGIONS:  Small fat-containing umbilical wall hernia with minimal stranding is seen      OSSEOUS STRUCTURES:  No acute fracture or destructive osseous lesion      IMPRESSION:     Small fat-containing umbilical wall hernia with minimal stranding  Cannot rule out incarcerated hernia  No evidence of bowel obstruction  Update note: on exam   Patient has significant tenderness of the umbilicus  No redness  No swelling  Pain is predominantly of the left back and radiates around to the left groin and down into the left leg  I suspect this is more radiculopathy in nature  Patient states pain is worse with movement  It is a radiating pain    Not reproducible specifically with palpation of the abdomen  There is no rash to suggest shingles  Amount and/or Complexity of Data Reviewed  Clinical lab tests: ordered and reviewed  Decide to obtain previous medical records or to obtain history from someone other than the patient: yes  Review and summarize past medical records: yes      CritCare Time    Disposition  Final diagnoses:   Lumbar radiculopathy, acute     Time reflects when diagnosis was documented in both MDM as applicable and the Disposition within this note     Time User Action Codes Description Comment    8/14/2018 11:45 AM Edissergio Tapiasangita Add [M54 16] Lumbar radiculopathy, acute       ED Disposition     ED Disposition Condition Comment    Discharge  [address] [facility] discharge to home/self care  Condition at discharge: Stable        Follow-up Information     Follow up With Specialties Details Why Kuldeep Will MD Family Medicine In 5 days For re-evaluation and follow-up for this visit [address]  [phone]            Discharge Medication List as of 8/14/2018 11:46 AM      START taking these medications    Details   ibuprofen (MOTRIN) 400 mg tablet Take 1 tablet (400 mg total) by mouth every 8 (eight) hours as needed for mild pain for up to 15 doses, Starting Tue 8/14/2018, Print         CONTINUE these medications which have NOT CHANGED    Details   acetaminophen-codeine (TYLENOL #3) 300-30 mg per tablet Take 1-2 tablets by mouth every 6 (six) hours as needed for moderate pain for up to 5 days, Starting Mon 8/13/2018, Until Sat 8/18/2018, Print      amLODIPine (NORVASC) 10 mg tablet Take 10 mg by mouth daily, Historical Med      aspirin 81 mg chewable tablet Chew 81 mg daily, Historical Med      atorvastatin (LIPITOR) 40 mg tablet Take 40 mg by mouth daily, Historical Med      famotidine (PEPCID) 20 mg tablet Take 20 mg by mouth daily, Historical Med      levothyroxine 25 mcg tablet Take 25 mcg by mouth daily, Historical Med      lisinopril (ZESTRIL) 20 mg tablet Take 20 mg by mouth daily, Historical Med      metFORMIN (GLUCOPHAGE) 500 mg tablet Take 500 mg by mouth 2 (two) times a day with meals, Historical Med      sertraline (ZOLOFT) 25 mg tablet Take 50 mg by mouth daily, Historical Med           No discharge procedures on file      ED Provider  Electronically Signed by           Heather Cardenas MD  08/25/18 0658       Heather Cardenas MD  08/25/18 9907

## 2018-08-14 NOTE — DISCHARGE INSTRUCTIONS
Radiculopatía lumbar   LO QUE NECESITA SABER:   La radiculopatía lumbar es castro enfermedad dolorosa que sucede cuando un nervio de etienne marla lumbar (parte baja de la espalda) se pinza o se irrita  Los nervios controlan las sensaciones y movimientos de etienne cuerpo  Usted podría tener adormecimiento o dolor desde la parte inferior de la espalda hacia wilber pies  INSTRUCCIONES SOBRE EL [facility]OSPITALARIA:   Medicamentos:   · Medicamentos:     ¨ AINEs (Analgésicos antiinflamatorios no esteroides) kenneth el ibuprofeno, ayudan a disminuir la inflamación, el dolor y la fiebre  Paulette medicamento esta disponible con o sin castro receta médica  Los AINEs pueden causar sangrado estomacal o problemas renales en ciertas personas  Si usted jorge l un medicamento anticoagulante, siempre pregúntele a etienne médico si los LANDON son seguros para usted  Siempre cirilo la etiqueta de paulette medicamento y [place] instrucciones  ¨ Relajantes musculares  ayudan a reducir dolor y espasmos musculares  ¨ Opioides: Estos son medicamentos muy viraj que se administran para reducir el dolor severo  También se les conoce kenneth medicamentos narcóticos para el dolor  [place] el medicamento exactamente kenneth le indicó etienne médico     ¨ Esteroides orales: Los esteroides se administran para reducir la inflamación y el dolor  ¨ [place] wilber medicamentos kenneth se le haya indicado  Consulte con etienne médico si usted ayden que etienne medicamento no le está ayudando o si presenta efectos secundarios  Infórmele si es alérgico a algún medicamento  Mantenga castro lista actualizada de los [place], las vitaminas y los productos herbales que jorge l  Incluya los siguientes datos de los medicamentos: cantidad, frecuencia y motivo de administración  Traiga con usted la lista o los envases de la píldoras a wilber citas de seguimiento  Lleve la lista de los medicamentos con usted en mj de castro emergencia      Programe castro freda con etienne médico o especialista en columna dentro de 1 a 3 semanas:  Después de etienne primera freda de seguimiento, vuelva a etienne médico o especialista en columna cada 2 semanas hasta que se haya curado  Solicitar información acerca de fisioterapia para etienne problema  Anote wilber preguntas para que se acuerde de hacerlas ashlie wilber visitas  Fisioterapia:  Usted podría necesitar fisioterapia para ayudar a mejorar etienne condición  Etienne fisioterapeuta le podría enseñar ejercicios para ayudar a mejorar etienne postura (la forma que usted se para y se sienta), flexibilidad, y fuerza en la parte inferior de etienne espalda  Cuidado personal:   · Manténgase activo: Es mejor ser activo cuando se padece de radiculopatía lumbar  Etienne fisioterapeuta o médico probablemente le recomendarán que salga a caminar para poco a poco regresar a etienne rutina diaria  Evite guardar cama por largos periodos de [place]  Guardar cama podría empeorar wilber síntomas  No se mueva en formas que empeoran etienne dolor  Pregúntele a etienne médico por más información sobre la mejor forma de permanecer activo  · Uso de compresas frías o calientes:  Use compresas frías o calientes en el área adolorida para disminuir el dolor y la inflamación  Ponga hielo en castro bolsa plástica y cúbrala con castro toalla y colóquela sobre la parte de abajo de etienne espalda  [place] las compresas calientes con castro toalla para evitar quemaduras  Use el hielo kenneth se lo indican  · Evite levantar objetos pesados: Etienne condición podría empeorar si usted levanta cosas pesadas  Si es posible evite alzar del todo  · Mantenga un peso saludable:  El peso en exceso podría torcer etienne espalda  Hable con etienne médico sobre formas de adelgazar si usted sufre de [place]  Comuníquese con etienne médico o especialista en columna si:   · El dolor que siente no mejora dentro de 1 a 3 semanas después del [place]  · El dolor y la debilidad que siente evitan que usted lleve a cabo wilber actividades normales en el [place], casa o escuela      · Usted baja más de 10 libras en 6 meses sin proponérselo  · Usted está deprimido o dyana debido al dolor que siente  · Usted tiene preguntas o inquietudes acerca de hawkins condición o cuidado  Regrese a la estephania de emergencias si:   · Tiene temperatura de más de 100 4°F por más de 2 días  · Tiene un dolor de espalda o pierna severo que no sentía antes, o el dolor se propaga a ambas piernas  · Presenta señales nuevas de adormecimiento o debilidad, sobre todo en la parte de abajo de la espalda, piernas, brazos o genitales  · Tiene problemas nuevos para controlar la [place] y heces  · Usted siente kenneth si hawkins vejiga no se vaciara cuando orina  © 2017 [address] Information is for End User's use only and may not be sold, redistributed or otherwise used for commercial purposes  All illustrations and images included in CareNotes® are the copyrighted property of A D A M , Inc  or [employer]  Esta información es sólo para uso en educación  Hawkins intención no es darle un consejo médico sobre enfermedades o tratamientos  Colsulte con hawkins Bary Lazaro farmacéutico antes de seguir cualquier régimen médico para saber si es seguro y efectivo para usted

## 2019-02-05 ENCOUNTER — HOSPITAL ENCOUNTER (EMERGENCY)
Facility: HOSPITAL | Age: 79
Discharge: HOME | End: 2019-02-05
Attending: EMERGENCY MEDICINE
Payer: COMMERCIAL

## 2019-02-05 ENCOUNTER — APPOINTMENT (EMERGENCY)
Dept: RADIOLOGY | Facility: HOSPITAL | Age: 79
End: 2019-02-05
Payer: COMMERCIAL

## 2019-02-05 VITALS
SYSTOLIC BLOOD PRESSURE: 139 MMHG | BODY MASS INDEX: 30.27 KG/M2 | HEIGHT: 60 IN | RESPIRATION RATE: 18 BRPM | TEMPERATURE: 96.5 F | OXYGEN SATURATION: 98 % | HEART RATE: 73 BPM | WEIGHT: 154.2 LBS | DIASTOLIC BLOOD PRESSURE: 72 MMHG

## 2019-02-05 DIAGNOSIS — I10 HYPERTENSION: Primary | ICD-10-CM

## 2019-02-05 DIAGNOSIS — E03.9 HYPOTHYROIDISM: ICD-10-CM

## 2019-02-05 DIAGNOSIS — E11.9 DIABETES (HCC): ICD-10-CM

## 2019-02-05 LAB
ALBUMIN SERPL BCP-MCNC: 4.5 G/DL (ref 3–5.2)
ALP SERPL-CCNC: 95 U/L (ref 43–122)
ALT SERPL W P-5'-P-CCNC: 30 U/L (ref 9–52)
ANION GAP SERPL CALCULATED.3IONS-SCNC: 10 MMOL/L (ref 5–14)
AST SERPL W P-5'-P-CCNC: 36 U/L (ref 14–36)
BACTERIA UR QL AUTO: ABNORMAL /HPF
BASOPHILS # BLD AUTO: 0 THOUSANDS/ΜL (ref 0–0.1)
BASOPHILS NFR BLD AUTO: 1 % (ref 0–1)
BILIRUB SERPL-MCNC: 0.3 MG/DL
BILIRUB UR QL STRIP: NEGATIVE
BUN SERPL-MCNC: 26 MG/DL (ref 5–25)
CALCIUM SERPL-MCNC: 9.2 MG/DL (ref 8.4–10.2)
CHLORIDE SERPL-SCNC: 106 MMOL/L (ref 97–108)
CLARITY UR: CLEAR
CO2 SERPL-SCNC: 22 MMOL/L (ref 22–30)
COLOR UR: ABNORMAL
CREAT SERPL-MCNC: 1.5 MG/DL (ref 0.6–1.2)
EOSINOPHIL # BLD AUTO: 0.2 THOUSAND/ΜL (ref 0–0.4)
EOSINOPHIL NFR BLD AUTO: 3 % (ref 0–6)
ERYTHROCYTE [DISTWIDTH] IN BLOOD BY AUTOMATED COUNT: 14 %
GFR SERPL CREATININE-BSD FRML MDRD: 34 ML/MIN/1.73SQ M
GLUCOSE SERPL-MCNC: 154 MG/DL (ref 70–99)
GLUCOSE UR STRIP-MCNC: NEGATIVE MG/DL
HCT VFR BLD AUTO: 35.4 % (ref 36–46)
HGB BLD-MCNC: 11.5 G/DL (ref 12–16)
HGB UR QL STRIP.AUTO: NEGATIVE
KETONES UR STRIP-MCNC: NEGATIVE MG/DL
LEUKOCYTE ESTERASE UR QL STRIP: 500
LYMPHOCYTES # BLD AUTO: 3.1 THOUSANDS/ΜL (ref 0.5–4)
LYMPHOCYTES NFR BLD AUTO: 37 % (ref 25–45)
MCH RBC QN AUTO: 29.8 PG (ref 26–34)
MCHC RBC AUTO-ENTMCNC: 32.4 G/DL (ref 31–36)
MCV RBC AUTO: 92 FL (ref 80–100)
MONOCYTES # BLD AUTO: 0.5 THOUSAND/ΜL (ref 0.2–0.9)
MONOCYTES NFR BLD AUTO: 6 % (ref 1–10)
NEUTROPHILS # BLD AUTO: 4.5 THOUSANDS/ΜL (ref 1.8–7.8)
NEUTS SEG NFR BLD AUTO: 54 % (ref 45–65)
NITRITE UR QL STRIP: NEGATIVE
NON-SQ EPI CELLS URNS QL MICRO: ABNORMAL /HPF
PH UR STRIP.AUTO: 7 [PH] (ref 4.5–8)
PLATELET # BLD AUTO: 336 THOUSANDS/UL (ref 150–450)
PMV BLD AUTO: 7.7 FL (ref 8.9–12.7)
POTASSIUM SERPL-SCNC: 5 MMOL/L (ref 3.6–5)
PROT SERPL-MCNC: 8 G/DL (ref 5.9–8.4)
PROT UR STRIP-MCNC: NEGATIVE MG/DL
RBC # BLD AUTO: 3.85 MILLION/UL (ref 4–5.2)
RBC #/AREA URNS AUTO: ABNORMAL /HPF
SODIUM SERPL-SCNC: 138 MMOL/L (ref 137–147)
SP GR UR STRIP.AUTO: 1.01 (ref 1–1.04)
TROPONIN I SERPL-MCNC: <0.01 NG/ML (ref 0–0.03)
UROBILINOGEN UA: NEGATIVE MG/DL
WBC # BLD AUTO: 8.4 THOUSAND/UL (ref 4.5–11)
WBC #/AREA URNS AUTO: ABNORMAL /HPF

## 2019-02-05 PROCEDURE — 99284 EMERGENCY DEPT VISIT MOD MDM: CPT

## 2019-02-05 PROCEDURE — 93005 ELECTROCARDIOGRAM TRACING: CPT

## 2019-02-05 PROCEDURE — 84484 ASSAY OF TROPONIN QUANT: CPT | Performed by: EMERGENCY MEDICINE

## 2019-02-05 PROCEDURE — 81001 URINALYSIS AUTO W/SCOPE: CPT | Performed by: EMERGENCY MEDICINE

## 2019-02-05 PROCEDURE — 36415 COLL VENOUS BLD VENIPUNCTURE: CPT | Performed by: EMERGENCY MEDICINE

## 2019-02-05 PROCEDURE — 80053 COMPREHEN METABOLIC PANEL: CPT | Performed by: EMERGENCY MEDICINE

## 2019-02-05 PROCEDURE — 85025 COMPLETE CBC W/AUTO DIFF WBC: CPT | Performed by: EMERGENCY MEDICINE

## 2019-02-05 PROCEDURE — 71046 X-RAY EXAM CHEST 2 VIEWS: CPT

## 2019-02-05 RX ORDER — LISINOPRIL 20 MG/1
20 TABLET ORAL DAILY
Qty: 30 TABLET | Refills: 0 | Status: SHIPPED | OUTPATIENT
Start: 2019-02-05 | End: 2019-10-11 | Stop reason: SDUPTHER

## 2019-02-05 RX ORDER — LEVOTHYROXINE SODIUM 0.03 MG/1
25 TABLET ORAL DAILY
Qty: 30 TABLET | Refills: 0 | Status: SHIPPED | OUTPATIENT
Start: 2019-02-05 | End: 2019-10-11 | Stop reason: SDUPTHER

## 2019-02-05 RX ORDER — SERTRALINE HYDROCHLORIDE 25 MG/1
50 TABLET, FILM COATED ORAL DAILY
Qty: 30 TABLET | Refills: 0 | Status: SHIPPED | OUTPATIENT
Start: 2019-02-05 | End: 2019-04-02

## 2019-02-05 RX ORDER — ASPIRIN 81 MG/1
81 TABLET, CHEWABLE ORAL DAILY
Qty: 30 TABLET | Refills: 0 | Status: SHIPPED | OUTPATIENT
Start: 2019-02-05 | End: 2019-10-11 | Stop reason: SDUPTHER

## 2019-02-05 RX ORDER — FAMOTIDINE 20 MG/1
20 TABLET, FILM COATED ORAL DAILY
Qty: 30 TABLET | Refills: 0 | Status: SHIPPED | OUTPATIENT
Start: 2019-02-05 | End: 2019-04-02

## 2019-02-05 RX ORDER — ATORVASTATIN CALCIUM 40 MG/1
40 TABLET, FILM COATED ORAL DAILY
Qty: 30 TABLET | Refills: 0 | Status: SHIPPED | OUTPATIENT
Start: 2019-02-05 | End: 2019-10-11 | Stop reason: SDUPTHER

## 2019-02-05 NOTE — ED PROVIDER NOTES
History  Chief Complaint   Patient presents with    Dizziness     patient states that she has had dizziness x2 weeks, has not taken meds for 3 week because of no insurance  states no dizziness currently, also has headache, and intermittent chest pain  denies N/V/D       56-year-old female presents stating that she has a history of high blood pressure and diabetes  She reports that she is not currently have insurance and therefore has been off of her medications for the past month  She also states that she was not currently have a family physician to follow up with  She reports that she has had intermittent chest discomfort, headaches, and dizziness  She denies any the symptoms currently  She denies any focal neurological deficits  She is unsure what her blood pressure and blood sugar has been running  Hypertension   Severity:  Moderate  Onset quality:  Gradual  Timing:  Intermittent  Progression:  Unable to specify  Chronicity:  Recurrent  Relieved by:  Nothing  Worsened by:  Nothing  Ineffective treatments:  None tried  Associated symptoms: chest pain (Intermittently, not currently), dizziness (Intermittently) and headaches (Intermittently)    Associated symptoms: no abdominal pain, no anxiety, no blurred vision, no confusion, no ear pain, no fatigue, no fever, no hematuria, no loss of consciousness, no nausea, no neck pain, no palpitations, no peripheral edema, no shortness of breath, no syncope, no tinnitus, not vomiting and no weakness        Prior to Admission Medications   Prescriptions Last Dose Informant Patient Reported? Taking?    amLODIPine (NORVASC) 10 mg tablet   Yes No   Sig: Take 10 mg by mouth daily   aspirin 81 mg chewable tablet   Yes No   Sig: Chew 81 mg daily   atorvastatin (LIPITOR) 40 mg tablet   Yes No   Sig: Take 40 mg by mouth daily   famotidine (PEPCID) 20 mg tablet   Yes No   Sig: Take 20 mg by mouth daily   levothyroxine 25 mcg tablet   Yes No   Sig: Take 25 mcg by mouth daily   lisinopril (ZESTRIL) 20 mg tablet   Yes No   Sig: Take 20 mg by mouth daily   metFORMIN (GLUCOPHAGE) 500 mg tablet   Yes No   Sig: Take 500 mg by mouth 2 (two) times a day with meals   sertraline (ZOLOFT) 25 mg tablet   Yes No   Sig: Take 50 mg by mouth daily      Facility-Administered Medications: None       Past Medical History:   Diagnosis Date    Diabetes mellitus ([facility] [address] )     Heart abnormality     Hypertension     Renal disorder        Past Surgical History:   Procedure Laterality Date     SECTION         History reviewed  No pertinent family history  I have reviewed and agree with the history as documented  Social History   Substance Use Topics    Smoking status: Never Smoker    Smokeless tobacco: Never Used    Alcohol use No        Review of Systems   Constitutional: Negative for appetite change, chills, fatigue and fever  HENT: Negative for ear pain, postnasal drip, sinus pain, tinnitus and trouble swallowing  Eyes: Negative for blurred vision, redness and itching  Respiratory: Negative for chest tightness, shortness of breath and wheezing  Cardiovascular: Positive for chest pain (Intermittently, not currently)  Negative for palpitations, leg swelling and syncope  Gastrointestinal: Negative for abdominal pain, constipation, diarrhea, nausea and vomiting  Endocrine: Negative  Genitourinary: Negative for difficulty urinating, dysuria and hematuria  Musculoskeletal: Negative for back pain, myalgias and neck pain  Skin: Negative for rash  Allergic/Immunologic: Negative  Neurological: Positive for dizziness (Intermittently) and headaches (Intermittently)  Negative for seizures, loss of consciousness, syncope, speech difficulty, weakness, light-headedness and numbness  Hematological: Negative  Psychiatric/Behavioral: Negative  Negative for confusion  The patient is not nervous/anxious          Physical Exam  Physical Exam   Constitutional: She is oriented to person, place, and time  She appears well-developed and well-nourished  HENT:   Head: Normocephalic and atraumatic  Nose: Nose normal    Mouth/Throat: Oropharynx is clear and moist    Eyes: Pupils are equal, round, and reactive to light  Conjunctivae and EOM are normal    Neck: Normal range of motion  Neck supple  Cardiovascular: Normal rate, regular rhythm and normal heart sounds  Pulmonary/Chest: Effort normal and breath sounds normal  No respiratory distress  She has no wheezes  Abdominal: Soft  Bowel sounds are normal  There is no tenderness  There is no guarding  Musculoskeletal: She exhibits no edema, tenderness or deformity  Neurological: She is alert and oriented to person, place, and time  She has normal strength  No cranial nerve deficit or sensory deficit  Gait normal    Skin: Skin is warm and dry  Capillary refill takes less than 2 seconds  No rash noted  Psychiatric: She has a normal mood and affect  Her behavior is normal    Nursing note and vitals reviewed  Vital Signs  ED Triage Vitals [02/05/19 1551]   Temperature Pulse Respirations Blood Pressure SpO2   (!) 96 5 °F (35 8 °C) 84 18 151/73 97 %      Temp Source Heart Rate Source Patient Position - Orthostatic VS BP Location FiO2 (%)   Tympanic Monitor Sitting Left arm --      Pain Score       7           Vitals:    02/05/19 1551 02/05/19 1736   BP: 151/73 140/74   Pulse: 84 68   Patient Position - Orthostatic VS: Sitting Lying       Visual Acuity      ED Medications  Medications - No data to display    Diagnostic Studies  Results Reviewed     Procedure Component Value Units Date/Time    UA w Reflex to Microscopic [[id number]] Collected:  02/05/19 1657    Lab Status:   In process Specimen:  Urine from Urine, Clean Catch Updated:  02/05/19 1759    Troponin I [[id number]]  (Normal) Collected:  02/05/19 1605    Lab Status:  Final result Specimen:  Blood from Arm, Right Updated:  02/05/19 1643     Troponin I <0 01 ng/mL Comprehensive metabolic panel [[id number]]  (Abnormal) Collected:  02/05/19 1606    Lab Status:  Final result Specimen:  Blood from Arm, Right Updated:  02/05/19 1627     Sodium 138 mmol/L      Potassium 5 0 mmol/L      Chloride 106 mmol/L      CO2 22 mmol/L      ANION GAP 10 mmol/L      BUN 26 (H) mg/dL      Creatinine 1 50 (H) mg/dL      Glucose 154 (H) mg/dL      Calcium 9 2 mg/dL      AST 36 U/L      ALT 30 U/L      Alkaline Phosphatase 95 U/L      Total Protein 8 0 g/dL      Albumin 4 5 g/dL      Total Bilirubin 0 30 mg/dL      eGFR 34 (L) ml/min/1 73sq m     Narrative:         National Kidney Disease Education Program recommendations are as follows:  GFR calculation is accurate only with a steady state creatinine  Chronic Kidney disease less than 60 ml/min/1 73 sq  meters  Kidney failure less than 15 ml/min/1 73 sq  meters      CBC and differential [[id number]]  (Abnormal) Collected:  02/05/19 1605    Lab Status:  Final result Specimen:  Blood from Arm, Right Updated:  02/05/19 1618     WBC 8 40 Thousand/uL      RBC 3 85 (L) Million/uL      Hemoglobin 11 5 (L) g/dL      Hematocrit 35 4 (L) %      MCV 92 fL      MCH 29 8 pg      MCHC 32 4 g/dL      RDW 14 0 %      MPV 7 7 (L) fL      Platelets 354 Thousands/uL      Neutrophils Relative 54 %      Lymphocytes Relative 37 %      Monocytes Relative 6 %      Eosinophils Relative 3 %      Basophils Relative 1 %      Neutrophils Absolute 4 50 Thousands/µL      Lymphocytes Absolute 3 10 Thousands/µL      Monocytes Absolute 0 50 Thousand/µL      Eosinophils Absolute 0 20 Thousand/µL      Basophils Absolute 0 00 Thousands/µL                  XR chest 2 views    (Results Pending)              Procedures  ECG 12 Lead Documentation  Date/Time: 2/5/2019 4:02 PM  Performed by: Lainey Arias  Authorized by: Lainey Arias     Rate:     ECG rate:  72    ECG rate assessment: normal    Rhythm:     Rhythm: sinus rhythm    Ectopy:     Ectopy: none    QRS:     QRS axis: Normal  Conduction:     Conduction: normal    ST segments:     ST segments:  Normal  T waves:     T waves: inverted      Inverted:  V1, V2 and aVR           Phone Contacts  ED Phone Contact    ED Course                               MDM  Number of Diagnoses or Management Options  Diabetes ([facility] ): Hypertension:   Diagnosis management comments: 82-year-old female presents complaint of being out of her medications and having intermittent episodes of not feeling well  She reports being off of her blood pressure and diabetic medications for past two months due to lack of insurance and lack of a family physician  On exam she has no focal findings does not appear to be in any acute distress  She is to feel and has a completely nonfocal examination  Her blood pressure is mildly elevated and her blood sugar is only slightly elevated  Discussed the need for close outpatient follow-up along reasons to return to the ER  We will represcribe her medications with the exception of her metformin (due to her decreased kidney function) and norvasc (her BP has not been high enough to resume dual therapy immediately)           Amount and/or Complexity of Data Reviewed  Clinical lab tests: ordered and reviewed  Tests in the radiology section of CPT®: ordered and reviewed  Tests in the medicine section of CPT®: ordered and reviewed  Independent visualization of images, tracings, or specimens: yes        Disposition  Final diagnoses:   Hypertension   Diabetes ([facility] )     Time reflects when diagnosis was documented in both MDM as applicable and the Disposition within this note     Time User Action Codes Description Comment    2/5/2019  5:27 PM [employer] Add [I10] Hypertension     2/5/2019  5:27 PM [employer] Add [E11 9] Diabetes [facility])       ED Disposition     None      Follow-up Information     Follow up With Specialties Details Why Dona Miguel MD Family Medicine Schedule an appointment as soon as possible for a visit  [address] [facility]  [address]            Patient's Medications   Discharge Prescriptions    No medications on file     No discharge procedures on file      ED Provider  Electronically Signed by           Ruben Henao DO  02/05/19 9564

## 2019-02-05 NOTE — DISCHARGE INSTRUCTIONS
Hipertensión crónica, Cuidados ambulatorios   INFORMACIÓN GENERAL:   La hipertensión crónica  es castro condición a maddi plazo en la cual etienne presión arterial es más maylin de lo normal  La presión arterial es la fuerza que ejerce la robert contra las tabares de las arterias  La hipertensión es castro presión arterial de 140/90 o mucho más elevada  Los siguientes son los síntomas más comunes:   · Dolor de mary     · Visión borrosa     · Dolor en el pecho     · [employer] o debilidad    · Dificultad para respirar     · Sangrados nasales  Busque atención médica inmediata al presentar los siguientes síntomas:   · Karin dolor de mary o pérdida de la visión    · Debilidad en un brazo o castro pierna     · Confusión o tiene dificultad para hablar claramente    · Siente castro molestia en etienne pecho que parece kenneth si lo estuvieran apretando, castro presión, castro pesadez o dolor     · De repente se siente mareado o tiene dificultad para respirar    · Dolor o incomodidad en etienne espalda, suresh, mandíbula, estómago o brazo  El tratamiento para la hipertensión crónica  puede incluir un medicamento para baja la presión arterial  También necesitará hacer un cambio en etienne estilo de femi  Se debe leanne wilber medicamentos exactamente kenneth se lo indicaron  Controle la hipertensión crónica:   · Tómese la presión arterial en etienne casa  Siéntese y descanse por 5 minutos antes de tomarse la presión arterial  Extienda etienne brazo y apóyelo en castro superficie plana  Etienne brazo debe estar a la misma altura que etienne corazón  Siga las instrucciones que vienen con el monitor para la presión arterial o tensiómetro  Si es posible tome por lo menos 2 lecturas de la presión cada vez  Tómese la presión arterial por lo [employer] al día a la misma hora todos los días, por ejemplo castro en la mañana y la otra en la noche  Mantenga un registro de wilber lecturas de etienne presión arterial y llévelo consigo a wilber consultas de control  · Consuma menos sodio    No le añada sodio a los alimentos  Limete el consumo de alimentos que contienen un alto nivel de sodio, kenneth los alimentos [place], papitas saladas de [employer] air force, y melani para sandwich  Juarez proveedor de lara puede recomendarle que siga el régimen de alimentación denominada enfoque dietético para disminuir la hipertensión (o DASH, por wilber siglas en inglés)  El régimen es bajo en sodio, grasas saturadas y las grasas en total  Es maylin en potasio, calcio y Kacy  · Jelly Singh regular  Chanelle actividad física que sobrepase los 30 minutos al día damion todos los días de la [place] ayudará a bajar juarez presión arterial  Solicítele a juarez proveedor de [employer] recomiende el plan de actividad física que se ajuste a juarez situación personal      · Limite el consumo de bebidas alcohólicas  Las mujeres deberían limitar juarez consumo de alcohol a 1 trago por día  Los hombres deberían limitar juarez consumo de alcohol a 2 tragos por día  Se considera un trago de alcohol 12 onzas (350 ml) de cerveza, 5 onzas (150 ml) de vino o 1 ½ onza (45 ml) de licor  · No fume  Si usted fuma, nunca es tarde para dejar de fumar  El tabaco puede aumentar juarez presión arterial  El tabaquismo también puede empeorar otras afecciones de lara que usted padezca las cuales pueden aumentar juarez riesgo de presentar hipertensión  Solicite a juarez proveedor de [employer] información si requiere ayuda para dejar de fumar  Valorie Standing a juarez freda de control con juarez proveedor de [employer] se lo indicaron:  Usted necesitará regresar para que le revisen juarez presión arterial y para que le ordenen otros exámenes de laboratorio  Escriba las preguntas que tenga para que recuerde hacerlas ashlie wilber consultas médicas  ACUERDOS SOBRE JUAREZ CUIDADO:   Usted tiene el derecho de participar en la planificación de juarez cuidado  Aprenda todo lo que pueda sobre juarez condición y kenneth darle tratamiento   Discuta con wilber médicos wilber opciones de tratamiento para juntos decidir el cuidado que usted quiere recibir  Usted siempre tiene el derecho a rechazar hawkins tratamiento  Esta información es sólo para uso en educación  Hawkins intención no es darle un consejo médico sobre enfermedades o tratamientos  Colsulte con hawkins Denise [place] farmacéutico antes de seguir cualquier régimen médico para saber si es seguro y efectivo para usted  © 2014 [address] is for End User's use only and may not be sold, redistributed or otherwise used for commercial purposes  All illustrations and images included in CareNotes® are the copyrighted property of A D A M , Inc  or Cooper Can  Hipertensión, cuidados ambulatorios   INFORMACIÓN GENERAL:   La hipertensión  es la presión arterial maylin  La presión arterial es la fuerza que ejerce la robert contra las tabares de las arterias  La presión arterial maylin estaría a 140 / 90 o más maylin  La hipertensión causa que hawkins presión arterial se eleve tanto que hawkins corazón se ve forzado a trabajar [employer] de lo normal, lo cual puede causar daño al corazón  Síntomas comunes incluyen los siguientes:   · Dolor de mary     · Visión borrosa     · Dolor de pecho     · Mareos o debilidad     · Dificultad para respirar     · Sangrado de nariz  Busque cuidados inmediatos para los siguientes síntomas:   · Dolor de mary severo o pérdida de la visión    · Debilidad en un brazo o pierna    · Confusión o dificultad para hablar    · [employer] en el pecho kenneth castro sensación de estrujamiento, presión, llenura o dolor    · Sensación de desvanecimiento repentino o dificultad para respirar    · Dolor o molestias en la espalda, suresh, mandíbula, estómago o brazo  El tratamiento para la hipertensión  puede llegar a incluir medicamentos para bajar hawkins presión arterial  Es probable que usted también necesite hacer algunos cambios en hawkins estilo de femi  [place] hawkins medicamento exactamente kenneth se lo indiquen  Maneje hawkins hipertensión:   · Tómese la presión en casa  Siéntese y descanse por 5 minutos antes de tomarse la presión  Extienda etienne brazo y apóyelo sobre castro superficie plana  Etienne brazo debe estar al mismo nivel que etienne corazón  Siga las indicaciones que vienen con etienne monitor de presión arterial  Si es posible, tómese al menos 2 lecturas cada vez que se jorge l la presión  Tómese la presión por lo [employer] al día, al mismo tiempo cada día, kenneth por Stephanie Rouleau y la noche  Anote en etienne diario las lecturas de etienne presión y lleve etienne diario a wilber citas de seguimiento  · Coma menos sodio (sal)  No le agregue sal a wilber alimentos  Limite etienne consumo de alimentos altos en sodio, kenneth por ejemplo comidas enlatadas, isabelle tostadas, y melani para emparedado  Etienne proveedor de lara podría sugerirle que siga el plan de alimentación con enfoque dietético para reducir la hipertensión conocido kenneth dieta DASH, por wilber siglas en inglés  Joanne plan es bajo en sodio, grasas que no son saludables y grasas en etienne totalidad  El plan es alto en potasio, calcio y Kacy  · Ejercítese regularmente  Ejercítese por lo menos 30 minutos por día, la [employer] días de la [place]  [place] ayudará a bajar etienne presión arterial  Pregúntele a etienne proveedor de [place] plan de ejercicios para usted  · Limite el consumo de alcohol  Las mujeres deben limitar el consumo de alcohol a 1 trago al día, mientras los hombres deben limitarlo a 2 tragos al día  Un trago de alcohol equivale a 12 onzas de cerveza, 5 onzas de vino, o 1½ onzas de licor  · No fume  Si usted fuma, nunca es tarde para dejar de fumar  El tabaquismo [place] etienne presión arterial  Fumar también empeora otras condiciones de lara que usted podría tener y que a la vez aumentan etienne riesgo de hipertensión  Solicite a etienne proveedor de [employer] información si necesita ayuda para dejar de fumar    Programe un freda con etienne proveedor de lara según indicaciones:  Usted tendrá que regresar para que le revisen la presión arterial y para que le kiki otras pruebas de laboratorio  Anote wilber preguntas para que las recuerde ashlie wilber citas de seguimiento  ACUERDOS SOBRE ETIENNE CUIDADO:   Usted tiene el derecho de participar en la planificación de etienne cuidado  Aprenda todo lo que pueda sobre etienne condición y kenneth darle tratamiento  Discuta con wilber médicos wilber opciones de tratamiento para juntos decidir el cuidado que usted quiere recibir  Usted siempre tiene el derecho a rechazar etienne tratamiento  Esta información es sólo para uso en educación  Etienne intención no es darle un consejo médico sobre enfermedades o tratamientos  Colsulte con etienne Ángela Mall farmacéutico antes de seguir cualquier régimen médico para saber si es seguro y efectivo para usted  © 2014 [address] is for End User's use only and may not be sold, redistributed or otherwise used for commercial purposes  All illustrations and images included in CareNotes® are the copyrighted property of [employer] , [employer]  or Cooper [employer]  Diabetes tipo 2 en adultos   LO QUE NECESITA SABER:   La diabetes tipo 2 es castro enfermedad que afecta el modo en que etienne organismo utiliza la glucosa (azúcar)  Generalmente, cuando el nivel de azúcar en la robert [place], el páncreas produce más insulina  La insulina ayuda al cuerpo a extraer el azúcar de la robert con el fin de usarla kenneth natalie de [place]  La diabetes mellitus tipo 2 se desarrolla ya sea porque el cuerpo no puede producir suficiente insulina, o es incapaz de usarla adecuadamente  Después de [employer], etienne páncreas podría dejar de producir insulina     INSTRUCCIONES SOBRE EL JAYLENE HOSPITALARIA:   Llame al 911 en mj de presentar lo siguiente:   · Usted tiene alguno de los siguientes signos de derrame cerebral:      ¨ Adormecimiento o caída de un lado de etienne nicole     ¨ Debilidad en un brazo o castro pierna    ¨ Confusión o debilidad para hablar    ¨ Mareos o dolor de mary intenso, o pérdida de la visión  · Usted tiene alguno de los siguientes signos de un ataque cardíaco:      ¨ Estornudos, presión, o dolor en etienne pecho que dura mas de 5 minutos o regresa  ¨ Malestar o dolor en etienne espalda, suresh, mandíbula, abdomen, o brazo     ¨ Dificultad para respirar    ¨ Náuseas o vómito    ¨ Siente un desvanecimiento o tiene sudores fríos especialmente en el pecho o dificultad para respirar  Regrese a la estephania de emergencias si:   · Usted tiene dolor abdominal intenso, o el dolor se extiende a etienne espalda  Es posible que también esté vomitando  · Usted tiene dificultad para permanecer despierto o concentrarse  · Usted está temblando o sudando  · Usted tiene visión borrosa o doble  · Etienne aliento huele a fruta o surendra  · Etienne respiración es profunda y [place], o rápida y superficial      · Etienne ritmo cardíaco es acelerado y débil  Pregúntele a etienne [place] vitaminas y minerales son adecuados para usted  · Tiene vómitos o diarrea  · Tiene malestar estomacal y no puede ingerir los alimentos de etienne plan de comidas  · Usted se siente débil o más cansado de lo habitual      · Usted tiene [place], bernie de Axel Barrier o se irrita con facilidad  · Etienne piel está gladys, tibia, seca o inflamada  · Usted tiene castro herida que no cicatriza  · Usted tiene entumecimiento en los brazos o piernas  · Usted tiene problemas para sobrellevar etienne enfermedad o se siente ansioso o deprimido  · Usted tiene preguntas o inquietudes acerca de etienne condición o cuidado  Medicamentos:  Es posible que usted necesite alguno de los siguientes:  · Pueden recetarse medicamentos hipoglucémicos o la insulina  se puede administrar para disminuir la cantidad de azúcar en etienne robert  · Medicamentos para la presión arterial  podrían administrarse para disminuir etienne presión arterial  Etienne presión arterial debería estar a menos de 140/90       · El medicamento para disminuir el colesterol  podría administrarse para evitar castro enfermedad cardíaca  · Los medicamentos antiplaquetarios , kenneth la aspirina, [place] [facility] a prevenir coágulos de [place] wilber antiplaquetarios exactamente kenneth se le haya indicado  Estos medicamentos podrían causar mas probabilidad para sangrado y para desarrollar moretones  Si le prieto indicado el uso de aspirina, no tome acetaminofén o ibuprofeno en etienne lugar  · [place] wilber medicamentos kenneth se le haya indicado  Consulte con etienne médico si usted ayden que etienne medicamento no le está ayudando o si presenta efectos secundarios  Infórmele si es alérgico a cualquier medicamento  Mantenga castro lista actualizada de los [employer], las vitaminas y los productos herbales que jorge l  Incluya los siguientes datos de los medicamentos: cantidad, frecuencia y motivo de administración  Traiga con usted la lista o los envases de la píldoras a wilber citas de seguimiento  Lleve la lista de los medicamentos con usted en mj de castro emergencia  Revise etienne nivel de azúcar en la robert según indicaciones:  Le enseñarán cómo usar un monitor de glucosa (glucómetro)  Pregunte a etienne médico cuándo y con qué frecuencia revisarlo ashlie el día  Usted tendrá que revisar etienne nivel de azúcar en la robert por lo menos 3 veces al día si está usando insulina  Si usted revisa etienne nivel de azúcar en la robert  antes de castro comida , debe estar entre 80 y 130 mg/dL  Si usted revisa etienne nivel de azúcar en la robert de 1 a 2 horas  después de castro comida , debe estar por debajo de 180 mg/dL  Pregúntele a etienne médico si estas son buenas metas para usted  Anote wilber resultados y muéstreselos a etienne médico  Él podría usar los resultados para hacer cambios en wilber medicamentos, alimentación o programa de ejercicios  En mj que etienne nivel de azúcar esté demasiado bajo: Etienne nivel de azúcar en robert está demasiado bajo si llega a menos de 70 mg/dL  Si el nivel es demasiado bajo, coma o amy 15 gramos de carbohidratos de acción rápida   Estos se encuentran naturalmente en las frutas  Los carbohidratos de acción rápida aumentarán etienne nivel de azúcar en la robert enseguida  Ejemplos de 15 gramos de carbohidrato de acción rápida son 4 onzas (1/2 taza) de jugo de fruta o 4 onzas de gaseosa regular  Otros ejemplos son 2 cucharadas de pasas de uva o entre 3 y 4 tabletas de glucosa  Revise etienne nivel de azúcar en la robert 15 minutos más tarde  Si el nivel es todavía bajo (menos de 100 mg/dL), ingiera otros 15 gramos de carbohidratos  Cuando el nivel vuelva a 100 mg/dL, coma un refrigerio o alimento que contenga carbohidratos  [place] ayudará a evitar otra caída de etienne nivel de azúcar en la robert  Siempre siga cuidadosamente las instrucciones de etienne médico acerca de kenneth tratar los niveles bajos de azúcar en la robert  Use identificación de alerta médica:  Use un brazalete o collar de alerta médica o lleve consigo castro tarjeta que indique que tiene diabetes  Pregúntele a etienne médico dónde conseguir estos artículos  Revise wilber pies todos los días para renetta si tienen llagas:  Use zapatos y calcetines que le queden [place]  No se recorte las [place] [employer]  Consulte con etienne médico para obtener más información acerca del cuidado de los pies  Mantenga un peso saludable:  Consulte con etienne médico cuánto debería pesar  Un peso saludable puede ayudarlo a controlar etienne diabetes  Solicite a etienne médico que le ayude a crear un plan para perder peso de castro forma ham si usted tiene sobrepeso  Juntos podrán fijar metas de pérdida de peso alcanzables  Siga etienne plan de comidas:  Un dietista le ayudará a diseñar un plan de alimentación para mantener estable etienne nivel de azúcar en la robert  No se salte ninguna comida  Etienne nivel de azúcar en la robert puede bajar demasiado si usted ha tomado medicamento para la diabetes y no ha comido  · Mantenga un registro de los carbohidratos (alimentos dulces o de almidón)    Etienne nivel de azúcar en la robert puede aumentar demasiado si usted consume demasiados carbohidratos  Etienne dietista puede ayudarlo a diseñar comidas y meriendas que tengan la cantidad [place] de carbohidratos  · Consuma alimentos bajos en grasa , kenneth por ejemplo presas de ashley sin piel y [place] baja en grasa  · [place] menos sodio (sal)  Limite los alimentos altos en sodio kenneth por ejemplo la salsa de soya, isabelle tostadas y sopas  No añada sal a la comida que usted prepare  Restrinja el uso de sal de craig  Usted debe consumir menos de 2,300 mg de sodio por día  · Consuma alimentos altos en fibra kenneth verduras, pan integral y frijoles  · Limite el consumo de alcohol  El alcohol afecta etienne azúcar en la robert y puede hacer más difícil el control de etienne diabetes  Limite el consumo de alcohol a 1 trago por día si usted es yash  Si usted es hombre, limite el consumo de alcohol a 2 tragos por día  Un trago equivale a 12 onzas de cerveza, 5 onzas de vino o 1 onza y ½ de licor  Ejercítese según indicaciones:  La actividad física puede ayudarlo a mantener el nivel de glucosa en la robert estable, disminuir etienne riesgo de insuficiencia cardíaca y Silvestre Bathe a bajar de [place]  Burgos Rang estiramiento antes y después de [address]  Realice castro actividad física al menos 150 minutos cada semana  Reparta esta cantidad de ejercicio ashlie al menos 3 días a la semana  No deje de ejercitar por más de 2 días seguidos  Incluya ejercicios para fortalecer los músculos de 2 a 3 días a la semana  [employer] deben incluir entrenamiento del equilibrio de 2 a 3 veces a la semana  Actividades que ayudan a aumentar el equilibrio incluyen yoga y fawad chi  [place] con etienne médico para elaborar un plan de ejercicios  · Revise etienne nivel de azúcar en la robert antes y después de hacer castro [place]  Puede que los [employer] sugieran que cambie la cantidad de insulina que usted jorge l o los alimentos que consume   Si etienne nivel de azúcar en la robert es alto, revise etienne robert u orina para renetta si hay cetonas y hágalo antes de ejercitarse  No marylu ejercicio si etienne nivel de azúcar en la robert es alto y usted tiene cetonas  · Si etienne nivel de azúcar en la robert está por debajo de los 100 mg/dL  , coma castro merienda con carbohidratos antes de hacer ejercicio  Por ejemplo 4 a 6 galletas de soda, 1/2 banano, 1 taza (8 onzas) de [place], medio vaso (4 onzas) de jugo  [place] agua o líquidos que no contengan azúcar antes, ashlie y [address] [facility]  Pregúntele a etienne dietista o médico cuáles líquidos debería leanne cuando se ejercite  · No se siente por más de 30 minutos  Si usted no puede caminar, al [employer]  [place] le ayudará a permanecer activo y mantener la circulación sanguínea  No fume:  La nicotina puede perjudicar los vasos sanguíneos e impedir que pueda controlar etienne diabetes  Pida información a etienne médico si usted actualmente fuma y necesita ayuda para dejar de fumar  No use cigarrillos electrónicos o tabaco sin humo en vez de cigarrillos o para tratar de dejar de fumar  Todos estos aún contienen nicotina  Tómese la presión arterial luz kenneth le indicaron:  Pregúntele a etienne médico cuál debería ser etienne presión arterial  La mayoría de los adultos con diabetes y presión arterial maylin deben tener castro presión arterial sistólica (primer número) inferior a 140  La presión arterial diastólica (fadia número) debe ser inferior a 90  Pregunte sobre las vacunas:  Usted corre un mayor riesgo de presentar enfermedades graves si usted contrae la gripe, neumonía o hepatitis  Pregúntele a etienne médico si usted debe ponerse la vacuna contra la gripe, neumonía o hepatitis B y cuándo debe vacunarse  Acuda a wilber consultas de control con etienne médico según le indicaron  Necesitará regresar para que le realicen el examen de hemoglobina A1c cada 3 meses  Tendrá que regresar por lo menos castro vez al año para que le revisen los pies   Necesitará de un examen de la vista castro vez al año para revisar si tiene retinopatía  También necesitará exámenes de orina anuales para revisar si hay problemas renales  Es posible que usted necesite más exámenes para determinar si hay enfermedad cardíaca kenneth un electrocardiograma, un examen de estrés, monitoreo de hawkins presión arterial y exámenes de Emili Richter  Anote wilber preguntas para que se acuerde de hacerlas ashlie wilber visitas  © 2017 [address] [facility] Information is for End User's use only and may not be sold, redistributed or otherwise used for commercial purposes  All illustrations and images included in CareNotes® are the copyrighted property of A D A M , Inc  or [employer]  Esta información es sólo para uso en educación  Hawkins intención no es darle un consejo médico sobre enfermedades o tratamientos  Colsulte con hawkins [place] farmacéutico antes de seguir cualquier régimen médico para saber si es seguro y efectivo para usted

## 2019-02-06 LAB
ATRIAL RATE: 72 BPM
P AXIS: 12 DEGREES
PR INTERVAL: 178 MS
QRS AXIS: -6 DEGREES
QRSD INTERVAL: 84 MS
QT INTERVAL: 376 MS
QTC INTERVAL: 411 MS
T WAVE AXIS: 44 DEGREES
VENTRICULAR RATE: 72 BPM

## 2019-02-06 PROCEDURE — 93010 ELECTROCARDIOGRAM REPORT: CPT | Performed by: INTERNAL MEDICINE

## 2019-04-02 ENCOUNTER — APPOINTMENT (EMERGENCY)
Dept: RADIOLOGY | Facility: HOSPITAL | Age: 79
End: 2019-04-02
Payer: COMMERCIAL

## 2019-04-02 ENCOUNTER — APPOINTMENT (EMERGENCY)
Dept: NON INVASIVE DIAGNOSTICS | Facility: HOSPITAL | Age: 79
End: 2019-04-02
Payer: COMMERCIAL

## 2019-04-02 ENCOUNTER — HOSPITAL ENCOUNTER (EMERGENCY)
Facility: HOSPITAL | Age: 79
Discharge: HOME | End: 2019-04-02
Attending: EMERGENCY MEDICINE | Admitting: EMERGENCY MEDICINE
Payer: COMMERCIAL

## 2019-04-02 VITALS
HEART RATE: 69 BPM | SYSTOLIC BLOOD PRESSURE: 163 MMHG | WEIGHT: 154.98 LBS | RESPIRATION RATE: 20 BRPM | TEMPERATURE: 96 F | DIASTOLIC BLOOD PRESSURE: 86 MMHG | BODY MASS INDEX: 30.27 KG/M2 | OXYGEN SATURATION: 100 %

## 2019-04-02 DIAGNOSIS — M79.604 BILATERAL LEG PAIN: Primary | ICD-10-CM

## 2019-04-02 DIAGNOSIS — M79.605 BILATERAL LEG PAIN: Primary | ICD-10-CM

## 2019-04-02 PROCEDURE — 73562 X-RAY EXAM OF KNEE 3: CPT

## 2019-04-02 PROCEDURE — 99284 EMERGENCY DEPT VISIT MOD MDM: CPT

## 2019-04-02 PROCEDURE — 93970 EXTREMITY STUDY: CPT | Performed by: SURGERY

## 2019-04-02 PROCEDURE — 99284 EMERGENCY DEPT VISIT MOD MDM: CPT | Performed by: PHYSICIAN ASSISTANT

## 2019-04-02 PROCEDURE — 93970 EXTREMITY STUDY: CPT

## 2019-04-02 RX ORDER — METHOCARBAMOL 500 MG/1
500 TABLET, FILM COATED ORAL ONCE
Status: COMPLETED | OUTPATIENT
Start: 2019-04-02 | End: 2019-04-02

## 2019-04-02 RX ORDER — METHOCARBAMOL 500 MG/1
500 TABLET, FILM COATED ORAL 2 TIMES DAILY
Qty: 20 TABLET | Refills: 0 | Status: SHIPPED | OUTPATIENT
Start: 2019-04-02 | End: 2022-07-03

## 2019-04-02 RX ORDER — LIDOCAINE 50 MG/G
OINTMENT TOPICAL AS NEEDED
Qty: 35.44 G | Refills: 0 | Status: SHIPPED | OUTPATIENT
Start: 2019-04-02 | End: 2019-10-11 | Stop reason: SDUPTHER

## 2019-04-02 RX ORDER — ACETAMINOPHEN 500 MG
500 TABLET ORAL EVERY 6 HOURS PRN
Qty: 30 TABLET | Refills: 0 | Status: SHIPPED | OUTPATIENT
Start: 2019-04-02 | End: 2019-10-11 | Stop reason: SDUPTHER

## 2019-04-02 RX ORDER — ACETAMINOPHEN 325 MG/1
975 TABLET ORAL ONCE
Status: COMPLETED | OUTPATIENT
Start: 2019-04-02 | End: 2019-04-02

## 2019-04-02 RX ADMIN — METHOCARBAMOL 500 MG: 500 TABLET, FILM COATED ORAL at 12:05

## 2019-04-02 RX ADMIN — ACETAMINOPHEN 975 MG: 325 TABLET ORAL at 12:05

## 2019-04-30 ENCOUNTER — APPOINTMENT (EMERGENCY)
Dept: CT IMAGING | Facility: HOSPITAL | Age: 79
End: 2019-04-30
Payer: COMMERCIAL

## 2019-04-30 ENCOUNTER — APPOINTMENT (EMERGENCY)
Dept: RADIOLOGY | Facility: HOSPITAL | Age: 79
End: 2019-04-30
Payer: COMMERCIAL

## 2019-04-30 ENCOUNTER — HOSPITAL ENCOUNTER (EMERGENCY)
Facility: HOSPITAL | Age: 79
Discharge: HOME | End: 2019-04-30
Attending: EMERGENCY MEDICINE | Admitting: EMERGENCY MEDICINE
Payer: COMMERCIAL

## 2019-04-30 VITALS
HEART RATE: 75 BPM | RESPIRATION RATE: 16 BRPM | WEIGHT: 152.34 LBS | SYSTOLIC BLOOD PRESSURE: 127 MMHG | BODY MASS INDEX: 29.75 KG/M2 | TEMPERATURE: 98.5 F | OXYGEN SATURATION: 96 % | DIASTOLIC BLOOD PRESSURE: 76 MMHG

## 2019-04-30 DIAGNOSIS — R53.1 WEAKNESS: Primary | ICD-10-CM

## 2019-04-30 DIAGNOSIS — M19.90 ARTHRITIS: ICD-10-CM

## 2019-04-30 DIAGNOSIS — E27.8 ADRENAL NODULE (HCC): ICD-10-CM

## 2019-04-30 LAB
ALBUMIN SERPL BCP-MCNC: 4.3 G/DL (ref 3–5.2)
ALP SERPL-CCNC: 153 U/L (ref 43–122)
ALT SERPL W P-5'-P-CCNC: 22 U/L (ref 9–52)
ANION GAP SERPL CALCULATED.3IONS-SCNC: 10 MMOL/L (ref 5–14)
APTT PPP: 27 SECONDS (ref 23–34)
AST SERPL W P-5'-P-CCNC: 32 U/L (ref 14–36)
BACTERIA UR QL AUTO: ABNORMAL /HPF
BASOPHILS # BLD AUTO: 0.1 THOUSANDS/ΜL (ref 0–0.1)
BASOPHILS NFR BLD AUTO: 1 % (ref 0–1)
BILIRUB SERPL-MCNC: 0.3 MG/DL
BILIRUB UR QL STRIP: NEGATIVE
BUN SERPL-MCNC: 26 MG/DL (ref 5–25)
CALCIUM SERPL-MCNC: 9.4 MG/DL (ref 8.4–10.2)
CHLORIDE SERPL-SCNC: 102 MMOL/L (ref 97–108)
CK SERPL-CCNC: 94 U/L (ref 30–135)
CLARITY UR: CLEAR
CO2 SERPL-SCNC: 26 MMOL/L (ref 22–30)
COLOR UR: YELLOW
CREAT SERPL-MCNC: 1.4 MG/DL (ref 0.6–1.2)
EOSINOPHIL # BLD AUTO: 0.3 THOUSAND/ΜL (ref 0–0.4)
EOSINOPHIL NFR BLD AUTO: 3 % (ref 0–6)
ERYTHROCYTE [DISTWIDTH] IN BLOOD BY AUTOMATED COUNT: 14 %
GFR SERPL CREATININE-BSD FRML MDRD: 36 ML/MIN/1.73SQ M
GLUCOSE SERPL-MCNC: 118 MG/DL (ref 70–99)
GLUCOSE UR STRIP-MCNC: NEGATIVE MG/DL
HCT VFR BLD AUTO: 35.4 % (ref 36–46)
HGB BLD-MCNC: 11.7 G/DL (ref 12–16)
HGB UR QL STRIP.AUTO: NEGATIVE
INR PPP: 0.9 (ref 0.89–1.1)
KETONES UR STRIP-MCNC: NEGATIVE MG/DL
LEUKOCYTE ESTERASE UR QL STRIP: 25
LYMPHOCYTES # BLD AUTO: 2.7 THOUSANDS/ΜL (ref 0.5–4)
LYMPHOCYTES NFR BLD AUTO: 30 % (ref 25–45)
MCH RBC QN AUTO: 30.3 PG (ref 26–34)
MCHC RBC AUTO-ENTMCNC: 33.1 G/DL (ref 31–36)
MCV RBC AUTO: 92 FL (ref 80–100)
MONOCYTES # BLD AUTO: 0.7 THOUSAND/ΜL (ref 0.2–0.9)
MONOCYTES NFR BLD AUTO: 7 % (ref 1–10)
NEUTROPHILS # BLD AUTO: 5.3 THOUSANDS/ΜL (ref 1.8–7.8)
NEUTS SEG NFR BLD AUTO: 58 % (ref 45–65)
NITRITE UR QL STRIP: NEGATIVE
NON-SQ EPI CELLS URNS QL MICRO: ABNORMAL /HPF
PH UR STRIP.AUTO: 5 [PH]
PLATELET # BLD AUTO: 336 THOUSANDS/UL (ref 150–450)
PMV BLD AUTO: 7.4 FL (ref 8.9–12.7)
POTASSIUM SERPL-SCNC: 4.4 MMOL/L (ref 3.6–5)
PROT SERPL-MCNC: 7.7 G/DL (ref 5.9–8.4)
PROT UR STRIP-MCNC: NEGATIVE MG/DL
PROTHROMBIN TIME: 9.6 SECONDS (ref 9.5–11.6)
RBC # BLD AUTO: 3.87 MILLION/UL (ref 4–5.2)
RBC #/AREA URNS AUTO: ABNORMAL /HPF
SODIUM SERPL-SCNC: 138 MMOL/L (ref 137–147)
SP GR UR STRIP.AUTO: 1.01 (ref 1–1.04)
TROPONIN I SERPL-MCNC: <0.01 NG/ML (ref 0–0.03)
TSH SERPL DL<=0.05 MIU/L-ACNC: 1.38 UIU/ML (ref 0.47–4.68)
UROBILINOGEN UA: NEGATIVE MG/DL
WBC # BLD AUTO: 9.1 THOUSAND/UL (ref 4.5–11)
WBC #/AREA URNS AUTO: ABNORMAL /HPF

## 2019-04-30 PROCEDURE — 80053 COMPREHEN METABOLIC PANEL: CPT | Performed by: EMERGENCY MEDICINE

## 2019-04-30 PROCEDURE — 99284 EMERGENCY DEPT VISIT MOD MDM: CPT | Performed by: EMERGENCY MEDICINE

## 2019-04-30 PROCEDURE — 84443 ASSAY THYROID STIM HORMONE: CPT | Performed by: EMERGENCY MEDICINE

## 2019-04-30 PROCEDURE — 85610 PROTHROMBIN TIME: CPT | Performed by: EMERGENCY MEDICINE

## 2019-04-30 PROCEDURE — 85025 COMPLETE CBC W/AUTO DIFF WBC: CPT | Performed by: EMERGENCY MEDICINE

## 2019-04-30 PROCEDURE — 72070 X-RAY EXAM THORAC SPINE 2VWS: CPT

## 2019-04-30 PROCEDURE — 72100 X-RAY EXAM L-S SPINE 2/3 VWS: CPT

## 2019-04-30 PROCEDURE — 81003 URINALYSIS AUTO W/O SCOPE: CPT | Performed by: EMERGENCY MEDICINE

## 2019-04-30 PROCEDURE — 85730 THROMBOPLASTIN TIME PARTIAL: CPT | Performed by: EMERGENCY MEDICINE

## 2019-04-30 PROCEDURE — 99285 EMERGENCY DEPT VISIT HI MDM: CPT

## 2019-04-30 PROCEDURE — 96360 HYDRATION IV INFUSION INIT: CPT

## 2019-04-30 PROCEDURE — 71046 X-RAY EXAM CHEST 2 VIEWS: CPT

## 2019-04-30 PROCEDURE — 96361 HYDRATE IV INFUSION ADD-ON: CPT

## 2019-04-30 PROCEDURE — 71250 CT THORAX DX C-: CPT

## 2019-04-30 PROCEDURE — 74176 CT ABD & PELVIS W/O CONTRAST: CPT

## 2019-04-30 PROCEDURE — 93005 ELECTROCARDIOGRAM TRACING: CPT

## 2019-04-30 PROCEDURE — 81001 URINALYSIS AUTO W/SCOPE: CPT | Performed by: EMERGENCY MEDICINE

## 2019-04-30 PROCEDURE — 82550 ASSAY OF CK (CPK): CPT | Performed by: EMERGENCY MEDICINE

## 2019-04-30 PROCEDURE — 84484 ASSAY OF TROPONIN QUANT: CPT | Performed by: EMERGENCY MEDICINE

## 2019-04-30 RX ORDER — ACETAMINOPHEN 325 MG/1
650 TABLET ORAL ONCE
Status: COMPLETED | OUTPATIENT
Start: 2019-04-30 | End: 2019-04-30

## 2019-04-30 RX ADMIN — SODIUM CHLORIDE 1000 ML: 9 INJECTION, SOLUTION INTRAVENOUS at 17:18

## 2019-04-30 RX ADMIN — ACETAMINOPHEN 650 MG: 325 TABLET ORAL at 17:18

## 2019-05-01 LAB
ATRIAL RATE: 84 BPM
P AXIS: 12 DEGREES
PR INTERVAL: 200 MS
QRS AXIS: 4 DEGREES
QRSD INTERVAL: 80 MS
QT INTERVAL: 358 MS
QTC INTERVAL: 423 MS
T WAVE AXIS: 48 DEGREES
VENTRICULAR RATE: 84 BPM

## 2019-05-01 PROCEDURE — 93010 ELECTROCARDIOGRAM REPORT: CPT | Performed by: INTERNAL MEDICINE

## 2019-10-11 ENCOUNTER — HOSPITAL ENCOUNTER (EMERGENCY)
Facility: HOSPITAL | Age: 79
Discharge: HOME | End: 2019-10-11
Attending: EMERGENCY MEDICINE
Payer: COMMERCIAL

## 2019-10-11 ENCOUNTER — APPOINTMENT (EMERGENCY)
Dept: RADIOLOGY | Facility: HOSPITAL | Age: 79
End: 2019-10-11
Payer: COMMERCIAL

## 2019-10-11 ENCOUNTER — DOCUMENTATION (OUTPATIENT)
Dept: FAMILY MEDICINE CLINIC | Facility: CLINIC | Age: 79
End: 2019-10-11

## 2019-10-11 VITALS
RESPIRATION RATE: 18 BRPM | HEART RATE: 50 BPM | HEIGHT: 61 IN | OXYGEN SATURATION: 100 % | TEMPERATURE: 97.9 F | DIASTOLIC BLOOD PRESSURE: 102 MMHG | WEIGHT: 153.88 LBS | BODY MASS INDEX: 29.05 KG/M2 | SYSTOLIC BLOOD PRESSURE: 200 MMHG

## 2019-10-11 DIAGNOSIS — I10 HYPERTENSION: ICD-10-CM

## 2019-10-11 DIAGNOSIS — M25.561 RIGHT KNEE PAIN: Primary | ICD-10-CM

## 2019-10-11 DIAGNOSIS — M79.605 BILATERAL LEG PAIN: ICD-10-CM

## 2019-10-11 DIAGNOSIS — E11.9 DIABETES (HCC): ICD-10-CM

## 2019-10-11 DIAGNOSIS — M79.604 BILATERAL LEG PAIN: ICD-10-CM

## 2019-10-11 LAB — GLUCOSE SERPL-MCNC: 201 MG/DL (ref 65–140)

## 2019-10-11 PROCEDURE — 73562 X-RAY EXAM OF KNEE 3: CPT

## 2019-10-11 PROCEDURE — 99284 EMERGENCY DEPT VISIT MOD MDM: CPT | Performed by: EMERGENCY MEDICINE

## 2019-10-11 PROCEDURE — 99283 EMERGENCY DEPT VISIT LOW MDM: CPT

## 2019-10-11 PROCEDURE — 82948 REAGENT STRIP/BLOOD GLUCOSE: CPT

## 2019-10-11 RX ORDER — ACETAMINOPHEN 325 MG/1
650 TABLET ORAL ONCE
Status: COMPLETED | OUTPATIENT
Start: 2019-10-11 | End: 2019-10-11

## 2019-10-11 RX ORDER — LISINOPRIL 20 MG/1
20 TABLET ORAL DAILY
Qty: 30 TABLET | Refills: 0 | Status: SHIPPED | OUTPATIENT
Start: 2019-10-11 | End: 2021-06-04 | Stop reason: HOSPADM

## 2019-10-11 RX ORDER — ACETAMINOPHEN 500 MG
500 TABLET ORAL EVERY 6 HOURS PRN
Qty: 30 TABLET | Refills: 0 | Status: SHIPPED | OUTPATIENT
Start: 2019-10-11 | End: 2022-07-03

## 2019-10-11 RX ORDER — LEVOTHYROXINE SODIUM 0.03 MG/1
25 TABLET ORAL DAILY
Qty: 30 TABLET | Refills: 0 | Status: SHIPPED | OUTPATIENT
Start: 2019-10-11 | End: 2022-07-03 | Stop reason: ALTCHOICE

## 2019-10-11 RX ORDER — ASPIRIN 81 MG/1
81 TABLET, CHEWABLE ORAL DAILY
Qty: 30 TABLET | Refills: 0 | Status: SHIPPED | OUTPATIENT
Start: 2019-10-11 | End: 2022-07-03

## 2019-10-11 RX ORDER — AMLODIPINE BESYLATE 10 MG/1
10 TABLET ORAL DAILY
Qty: 30 TABLET | Refills: 0 | Status: SHIPPED | OUTPATIENT
Start: 2019-10-11

## 2019-10-11 RX ORDER — ATORVASTATIN CALCIUM 40 MG/1
40 TABLET, FILM COATED ORAL DAILY
Qty: 30 TABLET | Refills: 0 | Status: SHIPPED | OUTPATIENT
Start: 2019-10-11 | End: 2022-07-03

## 2019-10-11 RX ORDER — LIDOCAINE 50 MG/G
OINTMENT TOPICAL AS NEEDED
Qty: 35.44 G | Refills: 0 | Status: SHIPPED | OUTPATIENT
Start: 2019-10-11 | End: 2022-07-03

## 2019-10-11 RX ADMIN — ACETAMINOPHEN 650 MG: 325 TABLET ORAL at 12:34

## 2019-10-11 NOTE — ED NOTES
Pt states not taking her usual meds for anything because [place] was in [place] visiting and came back last month    Does not check her BP nor Glucose       Aria Chavez RN  10/11/19 5552

## 2019-10-11 NOTE — ED PROVIDER NOTES
History  Chief Complaint   Patient presents with    Knee Pain     1 week getting worse-itching and can barely walk  70-year-old female presents complaint of right knee pain  Symptoms have been fluctuating progressive over the past several weeks to months  She denies any acute injury or trauma states the pain is worse with ambulation  She has a history of hypertension and diabetes and has not been taking the medications that she was prescribed from the and states  It is that she has been taking medications that she is obtained from the [facility]  She has not been checking her blood sugar because then if it is high I will know if I am sick  She denies any chest pain, shortness of breath, headache, dizziness, or other acute issues or concerns  Knee Pain   Location:  Knee  Knee location:  R knee  Pain details:     Quality:  Aching and dull    Radiates to:  Does not radiate    Severity:  Mild    Onset quality:  Gradual    Timing:  Constant    Progression:  Waxing and waning  Prior injury to area:  No  Relieved by:  Nothing  Worsened by:  Bearing weight  Ineffective treatments:  None tried  Associated symptoms: stiffness and swelling    Associated symptoms: no back pain, no decreased ROM, no fatigue, no fever, no itching, no muscle weakness, no neck pain, no numbness and no tingling        Prior to Admission Medications   Prescriptions Last Dose Informant Patient Reported?  Taking?   acetaminophen (TYLENOL) 500 mg tablet   No No   Sig: Take 1 tablet (500 mg total) by mouth every 6 (six) hours as needed for mild pain   acetaminophen (TYLENOL) 500 mg tablet   No No   Sig: Take 1 tablet (500 mg total) by mouth every 6 (six) hours as needed for mild pain   amLODIPine (NORVASC) 10 mg tablet   Yes No   Sig: Take 10 mg by mouth daily   amLODIPine (NORVASC) 10 mg tablet   No No   Sig: Take 1 tablet (10 mg total) by mouth daily   aspirin 81 mg chewable tablet   No No   Sig: Chew 1 tablet (81 mg total) daily   aspirin 81 mg chewable tablet   No No   Sig: Chew 1 tablet (81 mg total) daily   atorvastatin (LIPITOR) 40 mg tablet   No No   Sig: Take 1 tablet (40 mg total) by mouth daily   Patient taking differently: Take 80 mg by mouth daily    atorvastatin (LIPITOR) 40 mg tablet   No No   Sig: Take 1 tablet (40 mg total) by mouth daily   levothyroxine 25 mcg tablet   No No   Sig: Take 1 tablet (25 mcg total) by mouth daily   levothyroxine 25 mcg tablet   No No   Sig: Take 1 tablet (25 mcg total) by mouth daily   lidocaine (XYLOCAINE) 5 % ointment   No No   Sig: Apply topically as needed for mild pain   lidocaine (XYLOCAINE) 5 % ointment   No No   Sig: Apply topically as needed for mild pain   lisinopril (ZESTRIL) 20 mg tablet   No No   Sig: Take 1 tablet (20 mg total) by mouth daily   lisinopril (ZESTRIL) 20 mg tablet   No No   Sig: Take 1 tablet (20 mg total) by mouth daily   methocarbamol (ROBAXIN) 500 mg tablet   No No   Sig: Take 1 tablet (500 mg total) by mouth 2 (two) times a day      Facility-Administered Medications: None       Past Medical History:   Diagnosis Date    Diabetes mellitus ([facility] [address] )     Heart abnormality     Hyperlipidemia     Hypertension     Renal disorder        Past Surgical History:   Procedure Laterality Date     SECTION         History reviewed  No pertinent family history  I have reviewed and agree with the history as documented  Social History     Tobacco Use    Smoking status: Never Smoker    Smokeless tobacco: Never Used   Substance Use Topics    Alcohol use: No    Drug use: No        Review of Systems   Constitutional: Negative for appetite change, chills, fatigue and fever  HENT: Negative for postnasal drip, sinus pain and trouble swallowing  Eyes: Negative for redness and itching  Respiratory: Negative for chest tightness, shortness of breath and wheezing  Cardiovascular: Negative for chest pain and leg swelling     Gastrointestinal: Negative for abdominal pain, constipation, diarrhea, nausea and vomiting  Endocrine: Negative  Genitourinary: Negative for difficulty urinating and dysuria  Musculoskeletal: Positive for arthralgias, gait problem, joint swelling and stiffness  Negative for back pain, myalgias and neck pain  Skin: Negative for itching and rash  Allergic/Immunologic: Negative  Neurological: Negative for dizziness, numbness and headaches  Hematological: Negative  Psychiatric/Behavioral: Negative  Physical Exam  Physical Exam   Constitutional: She is oriented to person, place, and time  She appears well-developed and well-nourished  HENT:   Head: Normocephalic and atraumatic  Nose: Nose normal    Mouth/Throat: Oropharynx is clear and moist    Eyes: Pupils are equal, round, and reactive to light  Conjunctivae and EOM are normal    Neck: Normal range of motion  Neck supple  Cardiovascular: Normal rate, regular rhythm and normal heart sounds  Pulmonary/Chest: Effort normal and breath sounds normal  No respiratory distress  She has no wheezes  Abdominal: Soft  Bowel sounds are normal  There is no tenderness  There is no guarding  Musculoskeletal: She exhibits no edema or deformity  Right knee: She exhibits swelling (Mild)  She exhibits normal range of motion, no effusion, no ecchymosis, no deformity, no erythema, normal alignment, no LCL laxity, normal patellar mobility, normal meniscus and no MCL laxity  Tenderness found  Medial joint line tenderness noted  No lateral joint line, no MCL, no LCL and no patellar tendon tenderness noted  Neurological: She is alert and oriented to person, place, and time  Skin: Skin is warm and dry  Capillary refill takes less than 2 seconds  No rash noted  Psychiatric: She has a normal mood and affect  Her behavior is normal    Nursing note and vitals reviewed        Vital Signs  ED Triage Vitals   Temperature Pulse Respirations Blood Pressure SpO2   10/11/19 1138 10/11/19 1138 10/11/19 1138 10/11/19 1138 10/11/19 1138   97 9 °F (36 6 °C) 62 20 (!) 244/113 100 %      Temp src Heart Rate Source Patient Position - Orthostatic VS BP Location FiO2 (%)   -- -- 10/11/19 1229 10/11/19 1229 --     Sitting Left arm       Pain Score       10/11/19 1138       9           Vitals:    10/11/19 1138 10/11/19 1229   BP: (!) 244/113 (!) 200/103   Pulse: 62 61   Patient Position - Orthostatic VS:  Sitting         Visual Acuity      ED Medications  Medications   acetaminophen (TYLENOL) tablet 650 mg (650 mg Oral Given 10/11/19 1234)       Diagnostic Studies  Results Reviewed     None                 XR knee 3 views right non injury   ED Interpretation by Dona Santoyo DO (10/11 1253)   No acute findings                 Procedures  Procedures       ED Course                               MDM  Number of Diagnoses or Management Options  Right knee pain:   Diagnosis management comments: 82-year-old female presents for evaluation of right knee pain  She has a history of this fluctuating for the past several weeks to months  Symptoms been progressive in nature no worse with ambulation  She has taken no medications for this  She also has multiple medical problems but has not been following for this  She is noted to be markedly hypertensive on evaluation here today  She declines any option of workup including chest x-ray, laboratory studies, or head CT  Patient offers no acute complaints in conjunction with her hypertension  She was reduced to one of the family practice residents who will be ensuring close outpatient follow-up  The patient is aware of reasons return to the ER  We will be prescribing her medications again at this time         Amount and/or Complexity of Data Reviewed  Tests in the radiology section of CPT®: reviewed and ordered  Independent visualization of images, tracings, or specimens: yes        Disposition  Final diagnoses:   Right knee pain   Hypertension   Diabetes ([address] )     Time reflects when diagnosis was documented in both MDM as applicable and the Disposition within this note     Time User Action Codes Description Comment    10/11/2019 11:56 AM Rogerio Michelle Add [M25 561] Right knee pain     10/11/2019 11:57 AM Rogerio Michelle Add [I10] Hypertension     10/11/2019 11:57 AM Rogerio Michelle Add [Z00 303,  M79 605] Bilateral leg pain     10/11/2019 12:04 PM Rogeroi Michelle Modify [I10] Hypertension     10/11/2019 12:04 PM Rogerio Michelle Add [E11 9] Diabetes [facility])       ED Disposition     ED Disposition Condition Date/Time Comment    Discharge Stable Fri Oct 11, 2019 11:56 AM Rajesh Fry discharge to home/self care              Follow-up Information     Follow up With Specialties Details Why Contact Info    Marshall Sage MD Family Medicine   [address] [place] [address] Katerina Lutz MD Orthopedic Surgery   [address] Emergency Department Emergency Medicine  If symptoms worsen [address] [facility] [address] [phone]          Current Discharge Medication List      CONTINUE these medications which have CHANGED    Details   acetaminophen (TYLENOL) 500 mg tablet Take 1 tablet (500 mg total) by mouth every 6 (six) hours as needed for mild pain  Qty: 30 tablet, Refills: 0    Associated Diagnoses: Bilateral leg pain      amLODIPine (NORVASC) 10 mg tablet Take 1 tablet (10 mg total) by mouth daily  Qty: 30 tablet, Refills: 0    Associated Diagnoses: Hypertension      aspirin 81 mg chewable tablet Chew 1 tablet (81 mg total) daily  Qty: 30 tablet, Refills: 0    Associated Diagnoses: Hypertension      atorvastatin (LIPITOR) 40 mg tablet Take 1 tablet (40 mg total) by mouth daily  Qty: 30 tablet, Refills: 0    Associated Diagnoses: Hypertension      levothyroxine 25 mcg tablet Take 1 tablet (25 mcg total) by mouth daily  Qty: 30 tablet, Refills: 0    Associated Diagnoses: Hypertension      lidocaine (XYLOCAINE) 5 % ointment Apply topically as needed for mild pain  Qty: 35 44 g, Refills: 0    Associated Diagnoses: Bilateral leg pain      lisinopril (ZESTRIL) 20 mg tablet Take 1 tablet (20 mg total) by mouth daily  Qty: 30 tablet, Refills: 0    Associated Diagnoses: Hypertension         CONTINUE these medications which have NOT CHANGED    Details   methocarbamol (ROBAXIN) 500 mg tablet Take 1 tablet (500 mg total) by mouth 2 (two) times a day  Qty: 20 tablet, Refills: 0    Associated Diagnoses: Bilateral leg pain           No discharge procedures on file      ED Provider  Electronically Signed by           Ruth Veliz DO  10/13/19 1984

## 2020-02-14 ENCOUNTER — TRANSCRIBE ORDERS (OUTPATIENT)
Dept: ADMINISTRATIVE | Facility: HOSPITAL | Age: 80
End: 2020-02-14

## 2020-02-14 ENCOUNTER — APPOINTMENT (OUTPATIENT)
Dept: LAB | Facility: HOSPITAL | Age: 80
End: 2020-02-14
Payer: COMMERCIAL

## 2020-02-14 DIAGNOSIS — E87.5 HYPERPOTASSEMIA: ICD-10-CM

## 2020-02-14 DIAGNOSIS — E87.5 HYPERKALEMIA: ICD-10-CM

## 2020-02-14 DIAGNOSIS — E87.5 HYPERKALEMIA: Primary | ICD-10-CM

## 2020-02-14 LAB
ANION GAP SERPL CALCULATED.3IONS-SCNC: 13 MMOL/L (ref 5–14)
BUN SERPL-MCNC: 34 MG/DL (ref 5–25)
CALCIUM SERPL-MCNC: 10 MG/DL (ref 8.4–10.2)
CHLORIDE SERPL-SCNC: 99 MMOL/L (ref 97–108)
CO2 SERPL-SCNC: 25 MMOL/L (ref 22–30)
CREAT SERPL-MCNC: 1.24 MG/DL (ref 0.6–1.2)
GFR SERPL CREATININE-BSD FRML MDRD: 42 ML/MIN/1.73SQ M
GLUCOSE P FAST SERPL-MCNC: 270 MG/DL (ref 70–99)
POTASSIUM SERPL-SCNC: 5.4 MMOL/L (ref 3.6–5)
SODIUM SERPL-SCNC: 137 MMOL/L (ref 137–147)

## 2020-02-14 PROCEDURE — 80048 BASIC METABOLIC PNL TOTAL CA: CPT

## 2020-02-14 PROCEDURE — 36415 COLL VENOUS BLD VENIPUNCTURE: CPT

## 2020-03-02 ENCOUNTER — APPOINTMENT (OUTPATIENT)
Dept: LAB | Facility: HOSPITAL | Age: 80
End: 2020-03-02
Payer: COMMERCIAL

## 2020-03-02 DIAGNOSIS — E87.5 HYPERPOTASSEMIA: ICD-10-CM

## 2020-03-02 DIAGNOSIS — E87.5 HYPERKALEMIA: ICD-10-CM

## 2020-03-02 LAB
ANION GAP SERPL CALCULATED.3IONS-SCNC: 13 MMOL/L (ref 5–14)
BUN SERPL-MCNC: 31 MG/DL (ref 5–25)
CALCIUM SERPL-MCNC: 9.9 MG/DL (ref 8.4–10.2)
CHLORIDE SERPL-SCNC: 97 MMOL/L (ref 97–108)
CO2 SERPL-SCNC: 26 MMOL/L (ref 22–30)
CREAT SERPL-MCNC: 1.52 MG/DL (ref 0.6–1.2)
GFR SERPL CREATININE-BSD FRML MDRD: 33 ML/MIN/1.73SQ M
GLUCOSE SERPL-MCNC: 227 MG/DL (ref 70–99)
POTASSIUM SERPL-SCNC: 5.6 MMOL/L (ref 3.6–5)
SODIUM SERPL-SCNC: 136 MMOL/L (ref 137–147)

## 2020-03-02 PROCEDURE — 80048 BASIC METABOLIC PNL TOTAL CA: CPT

## 2020-03-02 PROCEDURE — 36415 COLL VENOUS BLD VENIPUNCTURE: CPT

## 2020-03-19 ENCOUNTER — TRANSCRIBE ORDERS (OUTPATIENT)
Dept: ADMINISTRATIVE | Facility: HOSPITAL | Age: 80
End: 2020-03-19

## 2020-03-19 ENCOUNTER — APPOINTMENT (OUTPATIENT)
Dept: LAB | Facility: HOSPITAL | Age: 80
End: 2020-03-19
Payer: COMMERCIAL

## 2020-03-19 DIAGNOSIS — N18.30 CHRONIC KIDNEY DISEASE, STAGE III (MODERATE) (HCC): ICD-10-CM

## 2020-03-19 DIAGNOSIS — N18.30 CHRONIC KIDNEY DISEASE, STAGE III (MODERATE) (HCC): Primary | ICD-10-CM

## 2020-03-19 LAB
25(OH)D3 SERPL-MCNC: 21.5 NG/ML (ref 30–100)
ALBUMIN SERPL BCP-MCNC: 4.4 G/DL (ref 3–5.2)
ANION GAP SERPL CALCULATED.3IONS-SCNC: 10 MMOL/L (ref 5–14)
BUN SERPL-MCNC: 37 MG/DL (ref 5–25)
CALCIUM SERPL-MCNC: 9.8 MG/DL (ref 8.4–10.2)
CHLORIDE SERPL-SCNC: 104 MMOL/L (ref 97–108)
CO2 SERPL-SCNC: 24 MMOL/L (ref 22–30)
CREAT SERPL-MCNC: 1.3 MG/DL (ref 0.6–1.2)
ERYTHROCYTE [DISTWIDTH] IN BLOOD BY AUTOMATED COUNT: 13.4 %
GFR SERPL CREATININE-BSD FRML MDRD: 39 ML/MIN/1.73SQ M
GLUCOSE P FAST SERPL-MCNC: 172 MG/DL (ref 70–99)
HCT VFR BLD AUTO: 37.1 % (ref 36–46)
HGB BLD-MCNC: 12.1 G/DL (ref 12–16)
MCH RBC QN AUTO: 30.4 PG (ref 26–34)
MCHC RBC AUTO-ENTMCNC: 32.6 G/DL (ref 31–36)
MCV RBC AUTO: 93 FL (ref 80–100)
PHOSPHATE SERPL-MCNC: 4.2 MG/DL (ref 2.5–4.8)
PLATELET # BLD AUTO: 349 THOUSANDS/UL (ref 150–450)
PMV BLD AUTO: 8.2 FL (ref 8.9–12.7)
POTASSIUM SERPL-SCNC: 5.3 MMOL/L (ref 3.6–5)
RBC # BLD AUTO: 3.99 MILLION/UL (ref 4–5.2)
SODIUM SERPL-SCNC: 138 MMOL/L (ref 137–147)
WBC # BLD AUTO: 10 THOUSAND/UL (ref 4.5–11)

## 2020-03-19 PROCEDURE — 80069 RENAL FUNCTION PANEL: CPT

## 2020-03-19 PROCEDURE — 85027 COMPLETE CBC AUTOMATED: CPT

## 2020-03-19 PROCEDURE — 36415 COLL VENOUS BLD VENIPUNCTURE: CPT

## 2020-03-19 PROCEDURE — 82306 VITAMIN D 25 HYDROXY: CPT

## 2020-05-04 ENCOUNTER — TRANSCRIBE ORDERS (OUTPATIENT)
Dept: ADMINISTRATIVE | Facility: HOSPITAL | Age: 80
End: 2020-05-04

## 2020-05-20 ENCOUNTER — TRANSCRIBE ORDERS (OUTPATIENT)
Dept: LAB | Facility: HOSPITAL | Age: 80
End: 2020-05-20

## 2020-05-20 ENCOUNTER — APPOINTMENT (OUTPATIENT)
Dept: LAB | Facility: HOSPITAL | Age: 80
End: 2020-05-20
Payer: COMMERCIAL

## 2020-05-20 DIAGNOSIS — N18.30 CHRONIC RENAL DISEASE, STAGE III (HCC): Primary | ICD-10-CM

## 2020-05-20 DIAGNOSIS — N18.30 CHRONIC RENAL DISEASE, STAGE III (HCC): ICD-10-CM

## 2020-05-20 LAB
25(OH)D3 SERPL-MCNC: 19.3 NG/ML (ref 30–100)
ALBUMIN SERPL BCP-MCNC: 4.5 G/DL (ref 3–5.2)
ANION GAP SERPL CALCULATED.3IONS-SCNC: 10 MMOL/L (ref 5–14)
BACTERIA UR QL AUTO: NORMAL /HPF
BASOPHILS # BLD AUTO: 0.1 THOUSANDS/ΜL (ref 0–0.1)
BASOPHILS NFR BLD AUTO: 1 % (ref 0–1)
BILIRUB UR QL STRIP: NEGATIVE
BUN SERPL-MCNC: 18 MG/DL (ref 5–25)
CALCIUM SERPL-MCNC: 9.8 MG/DL (ref 8.4–10.2)
CHLORIDE SERPL-SCNC: 102 MMOL/L (ref 97–108)
CLARITY UR: CLEAR
CO2 SERPL-SCNC: 26 MMOL/L (ref 22–30)
COLOR UR: NORMAL
CREAT SERPL-MCNC: 1.3 MG/DL (ref 0.6–1.2)
EOSINOPHIL # BLD AUTO: 0.2 THOUSAND/ΜL (ref 0–0.4)
EOSINOPHIL NFR BLD AUTO: 2 % (ref 0–6)
ERYTHROCYTE [DISTWIDTH] IN BLOOD BY AUTOMATED COUNT: 14 %
GFR SERPL CREATININE-BSD FRML MDRD: 39 ML/MIN/1.73SQ M
GLUCOSE P FAST SERPL-MCNC: 116 MG/DL (ref 70–99)
GLUCOSE UR STRIP-MCNC: NEGATIVE MG/DL
HCT VFR BLD AUTO: 34.9 % (ref 36–46)
HGB BLD-MCNC: 11.4 G/DL (ref 12–16)
HGB UR QL STRIP.AUTO: NEGATIVE
KETONES UR STRIP-MCNC: NEGATIVE MG/DL
LEUKOCYTE ESTERASE UR QL STRIP: NEGATIVE
LYMPHOCYTES # BLD AUTO: 3.5 THOUSANDS/ΜL (ref 0.5–4)
LYMPHOCYTES NFR BLD AUTO: 29 % (ref 25–45)
MCH RBC QN AUTO: 30 PG (ref 26–34)
MCHC RBC AUTO-ENTMCNC: 32.6 G/DL (ref 31–36)
MCV RBC AUTO: 92 FL (ref 80–100)
MONOCYTES # BLD AUTO: 0.8 THOUSAND/ΜL (ref 0.2–0.9)
MONOCYTES NFR BLD AUTO: 7 % (ref 1–10)
NEUTROPHILS # BLD AUTO: 7.3 THOUSANDS/ΜL (ref 1.8–7.8)
NEUTS SEG NFR BLD AUTO: 62 % (ref 45–65)
NITRITE UR QL STRIP: NEGATIVE
NON-SQ EPI CELLS URNS QL MICRO: NORMAL /HPF
PH UR STRIP.AUTO: 6 [PH]
PHOSPHATE SERPL-MCNC: 4.5 MG/DL (ref 2.5–4.8)
PLATELET # BLD AUTO: 336 THOUSANDS/UL (ref 150–450)
PMV BLD AUTO: 7.9 FL (ref 8.9–12.7)
POTASSIUM SERPL-SCNC: 4.8 MMOL/L (ref 3.6–5)
PROT UR STRIP-MCNC: NEGATIVE MG/DL
RBC # BLD AUTO: 3.8 MILLION/UL (ref 4–5.2)
RBC #/AREA URNS AUTO: NORMAL /HPF
SODIUM SERPL-SCNC: 138 MMOL/L (ref 137–147)
SP GR UR STRIP.AUTO: 1.01 (ref 1–1.04)
UROBILINOGEN UA: NEGATIVE MG/DL
WBC # BLD AUTO: 11.8 THOUSAND/UL (ref 4.5–11)
WBC #/AREA URNS AUTO: NORMAL /HPF

## 2020-05-20 PROCEDURE — 85025 COMPLETE CBC W/AUTO DIFF WBC: CPT

## 2020-05-20 PROCEDURE — 82306 VITAMIN D 25 HYDROXY: CPT

## 2020-05-20 PROCEDURE — 81001 URINALYSIS AUTO W/SCOPE: CPT

## 2020-05-20 PROCEDURE — 36415 COLL VENOUS BLD VENIPUNCTURE: CPT

## 2020-05-20 PROCEDURE — 80069 RENAL FUNCTION PANEL: CPT

## 2020-06-15 ENCOUNTER — APPOINTMENT (OUTPATIENT)
Dept: LAB | Facility: HOSPITAL | Age: 80
End: 2020-06-15
Payer: COMMERCIAL

## 2020-06-15 ENCOUNTER — TRANSCRIBE ORDERS (OUTPATIENT)
Dept: LAB | Facility: HOSPITAL | Age: 80
End: 2020-06-15

## 2020-06-15 DIAGNOSIS — I10 ESSENTIAL HYPERTENSION, MALIGNANT: ICD-10-CM

## 2020-06-15 DIAGNOSIS — E03.9 MYXEDEMA HEART DISEASE: ICD-10-CM

## 2020-06-15 DIAGNOSIS — I51.9 MYXEDEMA HEART DISEASE: ICD-10-CM

## 2020-06-15 DIAGNOSIS — E78.2 MIXED HYPERLIPIDEMIA: ICD-10-CM

## 2020-06-15 DIAGNOSIS — E78.2 MIXED HYPERLIPIDEMIA: Primary | ICD-10-CM

## 2020-06-15 LAB
ALBUMIN SERPL BCP-MCNC: 4.5 G/DL (ref 3–5.2)
ALP SERPL-CCNC: 136 U/L (ref 43–122)
ALT SERPL W P-5'-P-CCNC: 31 U/L (ref 9–52)
ANION GAP SERPL CALCULATED.3IONS-SCNC: 12 MMOL/L (ref 5–14)
AST SERPL W P-5'-P-CCNC: 40 U/L (ref 14–36)
BILIRUB SERPL-MCNC: 0.6 MG/DL
BUN SERPL-MCNC: 23 MG/DL (ref 5–25)
CALCIUM SERPL-MCNC: 10 MG/DL (ref 8.4–10.2)
CHLORIDE SERPL-SCNC: 104 MMOL/L (ref 97–108)
CHOLEST SERPL-MCNC: 188 MG/DL
CO2 SERPL-SCNC: 25 MMOL/L (ref 22–30)
CREAT SERPL-MCNC: 1.24 MG/DL (ref 0.6–1.2)
CREAT UR-MCNC: 219 MG/DL
EST. AVERAGE GLUCOSE BLD GHB EST-MCNC: 174 MG/DL
GFR SERPL CREATININE-BSD FRML MDRD: 42 ML/MIN/1.73SQ M
GLUCOSE P FAST SERPL-MCNC: 125 MG/DL (ref 70–99)
HBA1C MFR BLD: 7.7 %
HDLC SERPL-MCNC: 26 MG/DL
LDLC SERPL CALC-MCNC: 127 MG/DL
MICROALBUMIN UR-MCNC: 13.5 MG/L (ref 0–20)
MICROALBUMIN/CREAT 24H UR: 6 MG/G CREATININE (ref 0–30)
NONHDLC SERPL-MCNC: 162 MG/DL
POTASSIUM SERPL-SCNC: 5.1 MMOL/L (ref 3.6–5)
PROT SERPL-MCNC: 8.2 G/DL (ref 5.9–8.4)
SODIUM SERPL-SCNC: 141 MMOL/L (ref 137–147)
TRIGL SERPL-MCNC: 173 MG/DL
TSH SERPL DL<=0.05 MIU/L-ACNC: 2.72 UIU/ML (ref 0.47–4.68)

## 2020-06-15 PROCEDURE — 82570 ASSAY OF URINE CREATININE: CPT

## 2020-06-15 PROCEDURE — 36415 COLL VENOUS BLD VENIPUNCTURE: CPT

## 2020-06-15 PROCEDURE — 84443 ASSAY THYROID STIM HORMONE: CPT

## 2020-06-15 PROCEDURE — 80053 COMPREHEN METABOLIC PANEL: CPT

## 2020-06-15 PROCEDURE — 82043 UR ALBUMIN QUANTITATIVE: CPT

## 2020-06-15 PROCEDURE — 83036 HEMOGLOBIN GLYCOSYLATED A1C: CPT

## 2020-06-15 PROCEDURE — 80061 LIPID PANEL: CPT

## 2020-08-21 ENCOUNTER — APPOINTMENT (OUTPATIENT)
Dept: LAB | Facility: HOSPITAL | Age: 80
End: 2020-08-21
Payer: COMMERCIAL

## 2020-08-21 ENCOUNTER — TRANSCRIBE ORDERS (OUTPATIENT)
Dept: LAB | Facility: HOSPITAL | Age: 80
End: 2020-08-21

## 2020-08-21 DIAGNOSIS — N18.30 CHRONIC KIDNEY DISEASE, STAGE 3 (HCC): Primary | ICD-10-CM

## 2020-08-21 DIAGNOSIS — N18.30 CHRONIC KIDNEY DISEASE, STAGE 3 (HCC): ICD-10-CM

## 2020-08-21 PROCEDURE — 82088 ASSAY OF ALDOSTERONE: CPT

## 2020-08-21 PROCEDURE — 36415 COLL VENOUS BLD VENIPUNCTURE: CPT

## 2020-08-21 PROCEDURE — 84244 ASSAY OF RENIN: CPT

## 2020-08-25 LAB — ALDOST SERPL-MCNC: 6 NG/DL (ref 0–30)

## 2021-03-17 LAB — RENIN PLAS-CCNC: 0.41 NG/ML/HR (ref 0.17–5.38)

## 2021-03-31 ENCOUNTER — TRANSCRIBE ORDERS (OUTPATIENT)
Dept: LAB | Facility: HOSPITAL | Age: 81
End: 2021-03-31

## 2021-03-31 ENCOUNTER — APPOINTMENT (OUTPATIENT)
Dept: LAB | Facility: HOSPITAL | Age: 81
End: 2021-03-31
Payer: COMMERCIAL

## 2021-03-31 DIAGNOSIS — E87.5 HYPERKALEMIA: Primary | ICD-10-CM

## 2021-03-31 LAB
ANION GAP SERPL CALCULATED.3IONS-SCNC: 10 MMOL/L (ref 5–14)
BUN SERPL-MCNC: 19 MG/DL (ref 5–25)
CALCIUM SERPL-MCNC: 9.8 MG/DL (ref 8.4–10.2)
CHLORIDE SERPL-SCNC: 101 MMOL/L (ref 97–108)
CO2 SERPL-SCNC: 31 MMOL/L (ref 22–30)
CREAT SERPL-MCNC: 1.3 MG/DL (ref 0.6–1.2)
GFR SERPL CREATININE-BSD FRML MDRD: 39 ML/MIN/1.73SQ M
GLUCOSE P FAST SERPL-MCNC: 123 MG/DL (ref 70–99)
POTASSIUM SERPL-SCNC: 5 MMOL/L (ref 3.6–5)
SODIUM SERPL-SCNC: 142 MMOL/L (ref 137–147)

## 2021-03-31 PROCEDURE — 36415 COLL VENOUS BLD VENIPUNCTURE: CPT

## 2021-03-31 PROCEDURE — 80048 BASIC METABOLIC PNL TOTAL CA: CPT

## 2021-06-03 ENCOUNTER — APPOINTMENT (EMERGENCY)
Dept: RADIOLOGY | Facility: HOSPITAL | Age: 81
End: 2021-06-03
Payer: COMMERCIAL

## 2021-06-03 ENCOUNTER — HOSPITAL ENCOUNTER (OUTPATIENT)
Facility: HOSPITAL | Age: 81
Setting detail: OBSERVATION
Discharge: HOME | End: 2021-06-04
Attending: EMERGENCY MEDICINE | Admitting: INTERNAL MEDICINE
Payer: COMMERCIAL

## 2021-06-03 ENCOUNTER — APPOINTMENT (EMERGENCY)
Dept: CT IMAGING | Facility: HOSPITAL | Age: 81
End: 2021-06-03
Payer: COMMERCIAL

## 2021-06-03 DIAGNOSIS — M79.602 LEFT ARM PAIN: Primary | ICD-10-CM

## 2021-06-03 DIAGNOSIS — R07.9 CHEST PAIN: ICD-10-CM

## 2021-06-03 PROBLEM — R80.9 PROTEINURIA: Status: ACTIVE | Noted: 2020-03-03

## 2021-06-03 PROBLEM — E03.9 HYPOTHYROIDISM: Status: ACTIVE | Noted: 2020-03-05

## 2021-06-03 PROBLEM — I10 HYPERTENSION: Status: ACTIVE | Noted: 2020-03-04

## 2021-06-03 PROBLEM — E78.5 DYSLIPIDEMIA: Status: ACTIVE | Noted: 2020-03-26

## 2021-06-03 PROBLEM — N18.30 STAGE 3 CHRONIC KIDNEY DISEASE (HCC): Status: ACTIVE | Noted: 2020-03-04

## 2021-06-03 PROBLEM — E11.22 TYPE 2 DIABETES MELLITUS WITH DIABETIC CHRONIC KIDNEY DISEASE (HCC): Status: ACTIVE | Noted: 2020-03-05

## 2021-06-03 LAB
ALBUMIN SERPL BCP-MCNC: 4.2 G/DL (ref 3–5.2)
ALP SERPL-CCNC: 98 U/L (ref 43–122)
ALT SERPL W P-5'-P-CCNC: 46 U/L
ANION GAP SERPL CALCULATED.3IONS-SCNC: 8 MMOL/L (ref 5–14)
AST SERPL W P-5'-P-CCNC: 68 U/L (ref 14–36)
BACTERIA UR QL AUTO: ABNORMAL /HPF
BASOPHILS # BLD AUTO: 0 THOUSANDS/ΜL (ref 0–0.1)
BASOPHILS NFR BLD AUTO: 0 % (ref 0–1)
BILIRUB SERPL-MCNC: 0.23 MG/DL
BILIRUB UR QL STRIP: NEGATIVE
BUN SERPL-MCNC: 22 MG/DL (ref 5–25)
CALCIUM SERPL-MCNC: 9.7 MG/DL (ref 8.4–10.2)
CHLORIDE SERPL-SCNC: 104 MMOL/L (ref 97–108)
CLARITY UR: CLEAR
CO2 SERPL-SCNC: 27 MMOL/L (ref 22–30)
COLOR UR: ABNORMAL
CREAT SERPL-MCNC: 1.31 MG/DL (ref 0.6–1.2)
EOSINOPHIL # BLD AUTO: 0.3 THOUSAND/ΜL (ref 0–0.4)
EOSINOPHIL NFR BLD AUTO: 3 % (ref 0–6)
ERYTHROCYTE [DISTWIDTH] IN BLOOD BY AUTOMATED COUNT: 14.6 %
GFR SERPL CREATININE-BSD FRML MDRD: 39 ML/MIN/1.73SQ M
GLUCOSE SERPL-MCNC: 79 MG/DL (ref 65–140)
GLUCOSE SERPL-MCNC: 80 MG/DL (ref 70–99)
GLUCOSE SERPL-MCNC: 83 MG/DL (ref 65–140)
GLUCOSE UR STRIP-MCNC: NEGATIVE MG/DL
HCT VFR BLD AUTO: 32.4 % (ref 36–46)
HGB BLD-MCNC: 11 G/DL (ref 12–16)
HGB UR QL STRIP.AUTO: NEGATIVE
KETONES UR STRIP-MCNC: NEGATIVE MG/DL
LEUKOCYTE ESTERASE UR QL STRIP: 100
LYMPHOCYTES # BLD AUTO: 3.2 THOUSANDS/ΜL (ref 0.5–4)
LYMPHOCYTES NFR BLD AUTO: 40 % (ref 25–45)
MCH RBC QN AUTO: 31.6 PG (ref 26–34)
MCHC RBC AUTO-ENTMCNC: 34 G/DL (ref 31–36)
MCV RBC AUTO: 93 FL (ref 80–100)
MONOCYTES # BLD AUTO: 0.7 THOUSAND/ΜL (ref 0.2–0.9)
MONOCYTES NFR BLD AUTO: 8 % (ref 1–10)
NEUTROPHILS # BLD AUTO: 3.9 THOUSANDS/ΜL (ref 1.8–7.8)
NEUTS SEG NFR BLD AUTO: 49 % (ref 45–65)
NITRITE UR QL STRIP: NEGATIVE
NON-SQ EPI CELLS URNS QL MICRO: ABNORMAL /HPF
NT-PROBNP SERPL-MCNC: 265 PG/ML (ref 0–299)
PH UR STRIP.AUTO: 6.5 [PH]
PLATELET # BLD AUTO: 269 THOUSANDS/UL (ref 150–450)
PMV BLD AUTO: 7.9 FL (ref 8.9–12.7)
POTASSIUM SERPL-SCNC: 4.7 MMOL/L (ref 3.6–5)
PROT SERPL-MCNC: 7.6 G/DL (ref 5.9–8.4)
PROT UR STRIP-MCNC: NEGATIVE MG/DL
RBC # BLD AUTO: 3.48 MILLION/UL (ref 4–5.2)
RBC #/AREA URNS AUTO: ABNORMAL /HPF
SODIUM SERPL-SCNC: 139 MMOL/L (ref 137–147)
SP GR UR STRIP.AUTO: 1.01 (ref 1–1.04)
TROPONIN I SERPL-MCNC: 0.01 NG/ML (ref 0–0.03)
TROPONIN I SERPL-MCNC: <0.01 NG/ML (ref 0–0.03)
UROBILINOGEN UA: NEGATIVE MG/DL
WBC # BLD AUTO: 8.1 THOUSAND/UL (ref 4.5–11)
WBC #/AREA URNS AUTO: ABNORMAL /HPF

## 2021-06-03 PROCEDURE — 99285 EMERGENCY DEPT VISIT HI MDM: CPT | Performed by: PHYSICIAN ASSISTANT

## 2021-06-03 PROCEDURE — 99285 EMERGENCY DEPT VISIT HI MDM: CPT

## 2021-06-03 PROCEDURE — 85025 COMPLETE CBC W/AUTO DIFF WBC: CPT | Performed by: PHYSICIAN ASSISTANT

## 2021-06-03 PROCEDURE — 71275 CT ANGIOGRAPHY CHEST: CPT

## 2021-06-03 PROCEDURE — 82948 REAGENT STRIP/BLOOD GLUCOSE: CPT

## 2021-06-03 PROCEDURE — 81001 URINALYSIS AUTO W/SCOPE: CPT | Performed by: PHYSICIAN ASSISTANT

## 2021-06-03 PROCEDURE — 73030 X-RAY EXAM OF SHOULDER: CPT

## 2021-06-03 PROCEDURE — 36415 COLL VENOUS BLD VENIPUNCTURE: CPT | Performed by: PHYSICIAN ASSISTANT

## 2021-06-03 PROCEDURE — 84484 ASSAY OF TROPONIN QUANT: CPT | Performed by: PHYSICIAN ASSISTANT

## 2021-06-03 PROCEDURE — 93005 ELECTROCARDIOGRAM TRACING: CPT

## 2021-06-03 PROCEDURE — 83880 ASSAY OF NATRIURETIC PEPTIDE: CPT | Performed by: PHYSICIAN ASSISTANT

## 2021-06-03 PROCEDURE — 80053 COMPREHEN METABOLIC PANEL: CPT | Performed by: PHYSICIAN ASSISTANT

## 2021-06-03 RX ORDER — AMLODIPINE BESYLATE 10 MG/1
10 TABLET ORAL DAILY
Status: DISCONTINUED | OUTPATIENT
Start: 2021-06-04 | End: 2021-06-04 | Stop reason: HOSPADM

## 2021-06-03 RX ORDER — LIDOCAINE 50 MG/G
1 OINTMENT TOPICAL 4 TIMES DAILY PRN
Status: DISCONTINUED | OUTPATIENT
Start: 2021-06-03 | End: 2021-06-04 | Stop reason: HOSPADM

## 2021-06-03 RX ORDER — LISINOPRIL 20 MG/1
20 TABLET ORAL DAILY
Status: DISCONTINUED | OUTPATIENT
Start: 2021-06-04 | End: 2021-06-04

## 2021-06-03 RX ORDER — ASPIRIN 81 MG/1
162 TABLET, CHEWABLE ORAL ONCE
Status: COMPLETED | OUTPATIENT
Start: 2021-06-03 | End: 2021-06-03

## 2021-06-03 RX ORDER — ASPIRIN 81 MG/1
81 TABLET, CHEWABLE ORAL DAILY
Status: DISCONTINUED | OUTPATIENT
Start: 2021-06-04 | End: 2021-06-04 | Stop reason: HOSPADM

## 2021-06-03 RX ORDER — ONDANSETRON 2 MG/ML
4 INJECTION INTRAMUSCULAR; INTRAVENOUS EVERY 6 HOURS PRN
Status: DISCONTINUED | OUTPATIENT
Start: 2021-06-03 | End: 2021-06-04 | Stop reason: HOSPADM

## 2021-06-03 RX ORDER — LIDOCAINE 50 MG/G
1 OINTMENT TOPICAL 4 TIMES DAILY PRN
Status: DISCONTINUED | OUTPATIENT
Start: 2021-06-03 | End: 2021-06-03

## 2021-06-03 RX ORDER — ACETAMINOPHEN 325 MG/1
650 TABLET ORAL EVERY 6 HOURS PRN
Status: DISCONTINUED | OUTPATIENT
Start: 2021-06-03 | End: 2021-06-04 | Stop reason: HOSPADM

## 2021-06-03 RX ORDER — HEPARIN SODIUM 5000 [USP'U]/ML
5000 INJECTION, SOLUTION INTRAVENOUS; SUBCUTANEOUS EVERY 12 HOURS SCHEDULED
Status: DISCONTINUED | OUTPATIENT
Start: 2021-06-03 | End: 2021-06-04 | Stop reason: HOSPADM

## 2021-06-03 RX ORDER — ATORVASTATIN CALCIUM 40 MG/1
40 TABLET, FILM COATED ORAL DAILY
Status: DISCONTINUED | OUTPATIENT
Start: 2021-06-04 | End: 2021-06-04 | Stop reason: HOSPADM

## 2021-06-03 RX ORDER — METHOCARBAMOL 500 MG/1
500 TABLET, FILM COATED ORAL 2 TIMES DAILY
Status: DISCONTINUED | OUTPATIENT
Start: 2021-06-03 | End: 2021-06-04 | Stop reason: HOSPADM

## 2021-06-03 RX ORDER — LEVOTHYROXINE SODIUM 0.03 MG/1
25 TABLET ORAL DAILY
Status: DISCONTINUED | OUTPATIENT
Start: 2021-06-04 | End: 2021-06-04 | Stop reason: HOSPADM

## 2021-06-03 RX ORDER — LIDOCAINE 50 MG/G
1 PATCH TOPICAL ONCE
Status: COMPLETED | OUTPATIENT
Start: 2021-06-03 | End: 2021-06-04

## 2021-06-03 RX ORDER — ASPIRIN 81 MG/1
81 TABLET, CHEWABLE ORAL ONCE
Status: DISCONTINUED | OUTPATIENT
Start: 2021-06-03 | End: 2021-06-04

## 2021-06-03 RX ADMIN — HEPARIN SODIUM 5000 UNITS: 5000 INJECTION INTRAVENOUS; SUBCUTANEOUS at 21:08

## 2021-06-03 RX ADMIN — LIDOCAINE 1 PATCH: 50 PATCH TOPICAL at 17:15

## 2021-06-03 RX ADMIN — METHOCARBAMOL TABLETS 500 MG: 500 TABLET, COATED ORAL at 21:07

## 2021-06-03 RX ADMIN — IODIXANOL 85 ML: 320 INJECTION, SOLUTION INTRAVASCULAR at 18:35

## 2021-06-03 RX ADMIN — ASPIRIN 162 MG: 81 TABLET, CHEWABLE ORAL at 17:15

## 2021-06-03 NOTE — ED PROVIDER NOTES
History  Chief Complaint   Patient presents with    Arm Pain     L scapula down left arm, causing numbness  Patient 63-year-old female with a past medical history significant for diabetes, hypertension, hyperlipidemia renal disorder and reported heart abnormality of which she is unsure presents today for evaluation of left-sided shoulder pain  Patient reports the shoulder pain is an ongoing for the past month however the past 2 weeks been getting worse radiating down her arm  Patient denies any numbness, tingling, weakness, paresthesias, paralysis associated with the pain  Patient denies any skin changes or rashes, traumatic inciting events  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 the arm  Patient is right-handed  Patient denies headaches lightheadedness, dizziness  Patient reports no palpitations however does report that at times the left shoulder pain does radiate into the anterior aspect near her chest   Patient denies palpitations or dyspnea, leg swelling, long distance travel or recent surgeries  History provided by:  Patient  Arm Pain  Location:  Left shoulder/anterior chest  Quality:  Aching throbbing  Severity:  Moderate  Onset quality:  Gradual  Duration:  1 month  Timing:  Constant  Progression:  Unchanged  Chronicity:  New  Associated symptoms: chest pain ( left anterior / shoulder)    Associated symptoms: no abdominal pain, no congestion, no ear pain, no fatigue, no fever, no nausea, no rash, no rhinorrhea, no shortness of breath, no sore throat and no vomiting        Prior to Admission Medications   Prescriptions Last Dose Informant Patient Reported?  Taking?   acetaminophen (TYLENOL) 500 mg tablet   No No   Sig: Take 1 tablet (500 mg total) by mouth every 6 (six) hours as needed for mild pain   amLODIPine (NORVASC) 10 mg tablet   No No   Sig: Take 1 tablet (10 mg total) by mouth daily   aspirin 81 mg chewable tablet   No No   Sig: Chew 1 tablet (81 mg total) daily   atorvastatin (LIPITOR) 40 mg tablet   No No   Sig: Take 1 tablet (40 mg total) by mouth daily   levothyroxine 25 mcg tablet   No No   Sig: Take 1 tablet (25 mcg total) by mouth daily   lidocaine (XYLOCAINE) 5 % ointment   No No   Sig: Apply topically as needed for mild pain   lisinopril (ZESTRIL) 20 mg tablet   No No   Sig: Take 1 tablet (20 mg total) by mouth daily   methocarbamol (ROBAXIN) 500 mg tablet   No No   Sig: Take 1 tablet (500 mg total) by mouth 2 (two) times a day      Facility-Administered Medications: None       Past Medical History:   Diagnosis Date    Diabetes mellitus ([facility] [address] )     Heart abnormality     Hyperlipidemia     Hypertension     Renal disorder        Past Surgical History:   Procedure Laterality Date     SECTION         History reviewed  No pertinent family history  I have reviewed and agree with the history as documented  E-Cigarette/Vaping    E-Cigarette Use Never User      E-Cigarette/Vaping Substances     Social History     Tobacco Use    Smoking status: Never Smoker    Smokeless tobacco: Never Used   Substance Use Topics    Alcohol use: No    Drug use: No       Review of Systems   Constitutional: Negative for chills, fatigue and fever  HENT: Negative for congestion, ear pain, rhinorrhea and sore throat  Eyes: Negative for redness  Respiratory: Negative for chest tightness and shortness of breath  Cardiovascular: Positive for chest pain ( left anterior / shoulder)  Negative for palpitations  Gastrointestinal: Negative for abdominal pain, nausea and vomiting  Genitourinary: Negative for dysuria and hematuria  Musculoskeletal: Positive for arthralgias ( left shoulder)  Skin: Negative for rash  Neurological: Negative for dizziness, syncope, light-headedness and numbness  Physical Exam  Physical Exam  Vitals signs and nursing note reviewed  Constitutional:       Appearance: She is well-developed     HENT: Head: Normocephalic  Eyes:      General: No scleral icterus  Cardiovascular:      Rate and Rhythm: Normal rate and regular rhythm  Pulmonary:      Effort: Pulmonary effort is normal       Breath sounds: Normal breath sounds  No stridor  Abdominal:      General: There is no distension  Palpations: Abdomen is soft  Tenderness: There is no abdominal tenderness  Musculoskeletal: Normal range of motion  General: Tenderness present  Arms:       Comments: Equal  strength bilaterally  Normal sensation to both upper extremities  Skin:     General: Skin is warm and dry  Capillary Refill: Capillary refill takes less than 2 seconds  Neurological:      Mental Status: She is alert and oriented to person, place, and time           Vital Signs  ED Triage Vitals   Temperature Pulse Respirations Blood Pressure SpO2   06/03/21 1644 06/03/21 1646 06/03/21 1646 06/03/21 1646 06/03/21 1822   97 5 °F (36 4 °C) 75 16 (!) 199/113 100 %      Temp Source Heart Rate Source Patient Position - Orthostatic VS BP Location FiO2 (%)   06/03/21 1644 06/03/21 1646 06/03/21 1644 06/03/21 1644 --   Tympanic Monitor Sitting Left arm       Pain Score       06/03/21 1915       9           Vitals:    06/03/21 2137 06/04/21 0041 06/04/21 0500 06/04/21 0700   BP: 151/93 147/69 166/79 160/72   Pulse:  57 59 55   Patient Position - Orthostatic VS: Lying Lying Lying Lying         Visual Acuity      ED Medications  Medications   amLODIPine (NORVASC) tablet 10 mg (has no administration in time range)   aspirin chewable tablet 81 mg (has no administration in time range)   atorvastatin (LIPITOR) tablet 40 mg (has no administration in time range)   levothyroxine tablet 25 mcg (25 mcg Oral Given 6/4/21 0615)   methocarbamol (ROBAXIN) tablet 500 mg (500 mg Oral Given 6/3/21 2107)   acetaminophen (TYLENOL) tablet 650 mg (has no administration in time range)   ondansetron (ZOFRAN) injection 4 mg (has no administration in time range)   heparin (porcine) subcutaneous injection 5,000 Units (5,000 Units Subcutaneous Given 6/3/21 2108)   insulin lispro (HumaLOG) 100 units/mL subcutaneous injection 1-5 Units (has no administration in time range)   insulin lispro (HumaLOG) 100 units/mL subcutaneous injection 1-5 Units (1 Units Subcutaneous Not Given 6/3/21 2117)   lidocaine (XYLOCAINE) 5 % ointment 1 application (has no administration in time range)   lidocaine (LIDODERM) 5 % patch 1 patch (1 patch Topical Medication Applied 6/3/21 1715)   aspirin chewable tablet 162 mg (162 mg Oral Given 6/3/21 1715)   iodixanol (VISIPAQUE) 320 MG/ML injection 85 mL (85 mL Intravenous Given 6/3/21 1835)       Diagnostic Studies  Results Reviewed     Procedure Component Value Units Date/Time    Troponin I [[id number]]  (Normal) Collected: 06/04/21 0121    Lab Status: Final result Specimen: Blood from Arm, Left Updated: 06/04/21 0155     Troponin I <0 01 ng/mL     Fingerstick Glucose (POCT) [[id number]]  (Normal) Collected: 06/03/21 2116    Lab Status: Final result Updated: 06/03/21 2118     POC Glucose 83 mg/dl     Troponin I [[id number]]  (Normal) Collected: 06/03/21 2026    Lab Status: Final result Specimen: Blood from Arm, Left Updated: 06/03/21 2106     Troponin I <0 01 ng/mL     Urine Microscopic [[id number]]  (Abnormal) Collected: 06/03/21 1843    Lab Status: Final result Specimen: Urine, Other Updated: 06/03/21 1914     RBC, UA None Seen /hpf      WBC, UA 1-2 /hpf      Epithelial Cells Moderate /hpf      Bacteria, UA Occasional /hpf     UA (URINE) with reflex to Scope [[id number]]  (Abnormal) Collected: 06/03/21 1843    Lab Status: Final result Specimen: Urine, Other Updated: 06/03/21 1855     Color, UA Straw     Clarity, UA Clear     Specific Gravity, UA 1 010     pH, UA 6 5     Leukocytes,  0     Nitrite, UA Negative     Protein, UA Negative mg/dl      Glucose, UA Negative mg/dl      Ketones, UA Negative mg/dl      Bilirubin, UA Negative Blood, UA Negative     UROBILINOGEN UA Negative mg/dL     Troponin I [[id number]]  (Normal) Collected: 06/03/21 1721    Lab Status: Final result Specimen: Blood from Arm, Right Updated: 06/03/21 1811     Troponin I 0 01 ng/mL     NT-BNP PRO [[id number]]  (Normal) Collected: 06/03/21 1721    Lab Status: Final result Specimen: Blood from Arm, Right Updated: 06/03/21 1803     NT-proBNP 265 pg/mL     Comprehensive metabolic panel [[id number]]  (Abnormal) Collected: 06/03/21 1721    Lab Status: Final result Specimen: Blood from Arm, Right Updated: 06/03/21 1801     Sodium 139 mmol/L      Potassium 4 7 mmol/L      Chloride 104 mmol/L      CO2 27 mmol/L      ANION GAP 8 mmol/L      BUN 22 mg/dL      Creatinine 1 31 mg/dL      Glucose 80 mg/dL      Calcium 9 7 mg/dL      AST 68 U/L      ALT 46 U/L      Alkaline Phosphatase 98 U/L      Total Protein 7 6 g/dL      Albumin 4 2 g/dL      Total Bilirubin 0 23 mg/dL      eGFR 39 ml/min/1 73sq m     Narrative:      [facility] guidelines for Chronic Kidney Disease (CKD):     Stage 1 with normal or high GFR (GFR > 90 mL/min/1 73 square meters)    Stage 2 Mild CKD (GFR = 60-89 mL/min/1 73 square meters)    Stage 3A Moderate CKD (GFR = 45-59 mL/min/1 73 square meters)    Stage 3B Moderate CKD (GFR = 30-44 mL/min/1 73 square meters)    Stage 4 Severe CKD (GFR = 15-29 mL/min/1 73 square meters)    Stage 5 End Stage CKD (GFR <15 mL/min/1 73 square meters)  Note: GFR calculation is accurate only with a steady state creatinine    CBC and differential [[id number]]  (Abnormal) Collected: 06/03/21 1721    Lab Status: Final result Specimen: Blood from Arm, Right Updated: 06/03/21 1749     WBC 8 10 Thousand/uL      RBC 3 48 Million/uL      Hemoglobin 11 0 g/dL      Hematocrit 32 4 %      MCV 93 fL      MCH 31 6 pg      MCHC 34 0 g/dL      RDW 14 6 %      MPV 7 9 fL      Platelets 437 Thousands/uL      Neutrophils Relative 49 %      Lymphocytes Relative 40 % Monocytes Relative 8 %      Eosinophils Relative 3 %      Basophils Relative 0 %      Neutrophils Absolute 3 90 Thousands/µL      Lymphocytes Absolute 3 20 Thousands/µL      Monocytes Absolute 0 70 Thousand/µL      Eosinophils Absolute 0 30 Thousand/µL      Basophils Absolute 0 00 Thousands/µL     Fingerstick Glucose (POCT) [[id number]]  (Normal) Collected: 06/03/21 1652    Lab Status: Final result Updated: 06/03/21 1659     POC Glucose 79 mg/dl                  CTA ED chest PE Study   Final Result by Isha Palacios MD (06/03 1907)      1  No evidence of pulmonary embolus  2  Scattered patchy ground glass opacities and mild interlobular septal thickening suggests mild CHF  3   Scattered subcentimeter pulmonary nodules, stable from 2019 compatible with benign findings  4   New prominent right axillary lymph node measures 1 3 x 1 2 cm  Suggestion of surrounding infiltrative changes  While this could be inflammatory, pathologic lymph node is not excluded  Recommend follow-up ultrasound in 2-3 weeks  The study was marked in [facility] for immediate notification  Workstation performed: [id number]         XR shoulder 2+ views LEFT    (Results Pending)              Procedures  ECG 12 Lead Documentation Only    Date/Time: 6/3/2021 5:03 PM  Performed by: Robbi Hernández PA-C  Authorized by: Robbi Hernández PA-C     Indications / Diagnosis:  Left shoulder / anterior chest pain  Patient location:  ED  Rate:     ECG rate:  62    ECG rate assessment: normal    Rhythm:     Rhythm: sinus rhythm    Ectopy:     Ectopy: none    QRS:     QRS axis:  Normal    QRS intervals:  Normal  Conduction:     Conduction: normal    ST segments:     ST segments:  Normal  T waves:     T waves: inverted      Inverted:  V1  Q waves:     Q waves:  III  Comments:                   ED Course  ED Course as of Jun 04 0752   Thu Jun 03, 2021   [id number] Patient signed out to [facility] pending workup and imaging  HEART Risk Score      Most Recent Value   Heart Score Risk Calculator   History  0 Filed at: 06/03/2021 1702   ECG  1 Filed at: 06/03/2021 1702   Age  2 Filed at: 06/03/2021 1702   Risk Factors  2 Filed at: 06/03/2021 1702   Troponin  0 Filed at: 06/03/2021 1702   HEART Score  5 Filed at: 06/03/2021 1702          Stroke Assessment     Row Name 06/03/21 1701             NIH Stroke Scale    Interval  Baseline      Level of Consciousness (1a )  0      LOC Questions (1b )  0      LOC Commands (1c )  0      Best Gaze (2 )  0      Visual (3 )  0      Facial Palsy (4 )  0      Motor Arm, Left (5a )  0      Motor Arm, Right (5b )  0      Motor Leg, Left (6a )  0      Motor Leg, Right (6b )  0      Limb Ataxia (7 )  0      Sensory (8 )  0      Best Language (9 )  0      Dysarthria (10 )  0      Extinction and Inattention (11 ) (Formerly Neglect)  0      Total  0          First Filed Value   TPA Decision  Patient not a TPA candidate  Patient is not a candidate options  Symptoms resolved/clearly non disabling  SBIRT 22yo+      Most Recent Value   SBIRT (22 yo +)   In order to provide better care to our patients, we are screening all of our patients for alcohol and drug use  Would it be okay to ask you these screening questions? Yes Filed at: 06/03/2021 1722   Initial Alcohol Screen: US AUDIT-C    1  How often do you have a drink containing alcohol?  0 Filed at: 06/03/2021 1722   2  How many drinks containing alcohol do you have on a typical day you are drinking? 0 Filed at: 06/03/2021 1722   3a  Male UNDER 65: How often do you have five or more drinks on one occasion? 0 Filed at: 06/03/2021 1722   3b  FEMALE Any Age, or MALE 65+: How often do you have 4 or more drinks on one occassion? 0 Filed at: 06/03/2021 1722   Audit-C Score  0 Filed at: 06/03/2021 1722   ALICIA: How many times in the past year have you    Used an illegal drug or used a prescription medication for non-medical reasons? Never Filed at: 06/03/2021 1722        CHYNA Risk Score      Most Recent Value   Age >= 72  1 Filed at: 06/04/2021 5019   Known CAD (stenosis >= 50%)  0 Filed at: 06/04/2021 1447   Recent (<=24 hrs) Service Angina  0 Filed at: 06/04/2021 0736   ST Deviation >= 0 5 mm  0 Filed at: 06/04/2021 0736   3+ CAD Risk Factors (FHx, HTN, HLP, DM, Smoker)  1 Filed at: 06/04/2021 0736   Aspirin Use Past 7 Days  1 Filed at: 06/04/2021 0736   Elevated Cardiac Markers  0 Filed at: 06/04/2021 0736   CHYNA Risk Score (Calculated)  3 Filed at: 06/04/2021 8530                  [facility]  Number of Diagnoses or Management Options  Diagnosis management comments: 82-year-old female currently hypertensive with multiple medical comorbid conditions presents for evaluation of left-sided shoulder pain which at times radiates to the left anterior chest   Patient denies palpitations or dyspnea patient is well-appearing on exam however will obtain cardiac workup to include CT PE study as the differential diagnosis includes PE, aortic aneurysm, mi, pneumothorax, pneumonia, other cardiopulmonary abnormalities  Plan for admission due to heart score over 4       Portions of the record may have been created with voice recognition software  Occasional wrong word or "sound a like" substitutions may have occurred due to the inherent limitations of voice recognition software  Read the chart carefully and recognize, using context, where substitutions have occurred          Disposition  Final diagnoses:   Chest pain   Left arm pain     Time reflects when diagnosis was documented in both MDM as applicable and the Disposition within this note     Time User Action Codes Description Comment    6/3/2021  8:14 PM Denise Rowan [R07 [id number]] Chest pain     6/3/2021  8:14 PM Denise Rowan [[id number]] Left arm pain       ED Disposition     ED Disposition Condition Date/Time Comment    Admit Stable Thu Blade 3, 2021  8:14 PM Case was discussed with KARLEY and the patient's admission status was agreed to be Admission Status: observation status to the service of Dr Nicole Knight   Follow-up Information    None         Current Discharge Medication List      CONTINUE these medications which have NOT CHANGED    Details   acetaminophen (TYLENOL) 500 mg tablet Take 1 tablet (500 mg total) by mouth every 6 (six) hours as needed for mild pain  Qty: 30 tablet, Refills: 0    Associated Diagnoses: Bilateral leg pain      amLODIPine (NORVASC) 10 mg tablet Take 1 tablet (10 mg total) by mouth daily  Qty: 30 tablet, Refills: 0    Associated Diagnoses: Hypertension      aspirin 81 mg chewable tablet Chew 1 tablet (81 mg total) daily  Qty: 30 tablet, Refills: 0    Associated Diagnoses: Hypertension      atorvastatin (LIPITOR) 40 mg tablet Take 1 tablet (40 mg total) by mouth daily  Qty: 30 tablet, Refills: 0    Associated Diagnoses: Hypertension      levothyroxine 25 mcg tablet Take 1 tablet (25 mcg total) by mouth daily  Qty: 30 tablet, Refills: 0    Associated Diagnoses: Hypertension      lidocaine (XYLOCAINE) 5 % ointment Apply topically as needed for mild pain  Qty: 35 44 g, Refills: 0    Associated Diagnoses: Bilateral leg pain      lisinopril (ZESTRIL) 20 mg tablet Take 1 tablet (20 mg total) by mouth daily  Qty: 30 tablet, Refills: 0    Associated Diagnoses: Hypertension      methocarbamol (ROBAXIN) 500 mg tablet Take 1 tablet (500 mg total) by mouth 2 (two) times a day  Qty: 20 tablet, Refills: 0    Associated Diagnoses: Bilateral leg pain           No discharge procedures on file      PDMP Review     None          ED Provider  Electronically Signed by           Dayne Aj PA-C  06/04/21 4451

## 2021-06-04 VITALS
TEMPERATURE: 98.3 F | BODY MASS INDEX: 25.64 KG/M2 | WEIGHT: 139.33 LBS | SYSTOLIC BLOOD PRESSURE: 136 MMHG | OXYGEN SATURATION: 98 % | RESPIRATION RATE: 20 BRPM | HEIGHT: 62 IN | HEART RATE: 64 BPM | DIASTOLIC BLOOD PRESSURE: 78 MMHG

## 2021-06-04 PROBLEM — M25.512 LEFT SHOULDER PAIN: Status: ACTIVE | Noted: 2021-06-04

## 2021-06-04 PROBLEM — R07.89 OTHER CHEST PAIN: Status: ACTIVE | Noted: 2021-06-04

## 2021-06-04 LAB
ATRIAL RATE: 55 BPM
ATRIAL RATE: 57 BPM
ATRIAL RATE: 62 BPM
GLUCOSE SERPL-MCNC: 101 MG/DL (ref 65–140)
GLUCOSE SERPL-MCNC: 146 MG/DL (ref 65–140)
GLUCOSE SERPL-MCNC: 94 MG/DL (ref 65–140)
P AXIS: 16 DEGREES
P AXIS: 53 DEGREES
P AXIS: 9 DEGREES
PR INTERVAL: 180 MS
PR INTERVAL: 192 MS
PR INTERVAL: 198 MS
QRS AXIS: -1 DEGREES
QRS AXIS: -2 DEGREES
QRS AXIS: 2 DEGREES
QRSD INTERVAL: 74 MS
QRSD INTERVAL: 82 MS
QRSD INTERVAL: 84 MS
QT INTERVAL: 382 MS
QT INTERVAL: 406 MS
QT INTERVAL: 414 MS
QTC INTERVAL: 387 MS
QTC INTERVAL: 388 MS
QTC INTERVAL: 402 MS
T WAVE AXIS: 36 DEGREES
T WAVE AXIS: 40 DEGREES
T WAVE AXIS: 59 DEGREES
TROPONIN I SERPL-MCNC: <0.01 NG/ML (ref 0–0.03)
VENTRICULAR RATE: 55 BPM
VENTRICULAR RATE: 57 BPM
VENTRICULAR RATE: 62 BPM

## 2021-06-04 PROCEDURE — 93005 ELECTROCARDIOGRAM TRACING: CPT

## 2021-06-04 PROCEDURE — 82948 REAGENT STRIP/BLOOD GLUCOSE: CPT

## 2021-06-04 PROCEDURE — 84484 ASSAY OF TROPONIN QUANT: CPT | Performed by: PHYSICIAN ASSISTANT

## 2021-06-04 PROCEDURE — 36415 COLL VENOUS BLD VENIPUNCTURE: CPT | Performed by: PHYSICIAN ASSISTANT

## 2021-06-04 PROCEDURE — 93010 ELECTROCARDIOGRAM REPORT: CPT | Performed by: INTERNAL MEDICINE

## 2021-06-04 PROCEDURE — 99236 HOSP IP/OBS SAME DATE HI 85: CPT | Performed by: INTERNAL MEDICINE

## 2021-06-04 RX ADMIN — ACETAMINOPHEN 650 MG: 325 TABLET ORAL at 08:32

## 2021-06-04 RX ADMIN — ATORVASTATIN CALCIUM 40 MG: 40 TABLET, FILM COATED ORAL at 08:32

## 2021-06-04 RX ADMIN — DICLOFENAC SODIUM 2 G: 10 GEL TOPICAL at 10:16

## 2021-06-04 RX ADMIN — HEPARIN SODIUM 5000 UNITS: 5000 INJECTION INTRAVENOUS; SUBCUTANEOUS at 08:33

## 2021-06-04 RX ADMIN — METHOCARBAMOL TABLETS 500 MG: 500 TABLET, COATED ORAL at 08:31

## 2021-06-04 RX ADMIN — DICLOFENAC SODIUM 2 G: 10 GEL TOPICAL at 17:07

## 2021-06-04 RX ADMIN — DICLOFENAC SODIUM 2 G: 10 GEL TOPICAL at 11:48

## 2021-06-04 RX ADMIN — LEVOTHYROXINE SODIUM 25 MCG: 0.03 TABLET ORAL at 06:15

## 2021-06-04 RX ADMIN — AMLODIPINE BESYLATE 10 MG: 10 TABLET ORAL at 08:32

## 2021-06-04 RX ADMIN — ASPIRIN 81 MG: 81 TABLET, CHEWABLE ORAL at 08:32

## 2021-06-04 NOTE — H&P
Toni Garcia 1940, 80 y o  female MRN: [id number]  Unit/Bed#: 7T [facility] 712-02 Encounter: [id number]  Primary Care Provider: Marycarmen Perera MD   Date and time admitted to hospital: 6/3/2021  4:40 PM    * Left shoulder pain  Assessment & Plan  · Three weeks around the left scapula radiate to the forearm associated with numbness in the 4th and 5th fingers  · Differential include left shoulder impingement syndrome, frozen shoulder syndrome versus less likely atypical CAD  · On exam intact range of motion however noticed aggravation of left shoulder pain with flexion and extension as well as abduction  No obvious deformity  · Trend cardiac enzymes x3 normal  · Obtain serial EKGs x3 no ischemic changes  · Reviewed x-ray no obvious fractures per my read  · CT AP study no PE  · Start Voltaren gel 4 times a day  · Discharge home with referral to orthopedic surgery for possible intra-articular steroid injections  Type 2 diabetes mellitus with diabetic chronic kidney disease [facility])  Assessment & Plan  Lab Results   Component Value Date    HGBA1C 7 7 (H) 06/15/2020       Recent Labs     06/03/21  1652 06/03/21  2116   POCGLU 79 83       Blood Sugar Average: Last 72 hrs:  (P) 81     · Will place on [facility] step 2 diet  · Patient does not appear to be on any antihyperglycemics at home  · Will obtain Accu-Cheks AC and HS with Humalog correction dose a c  And hs    Stage 3 chronic kidney disease [facility])  Assessment & Plan  Lab Results   Component Value Date    EGFR 39 (L) 06/03/2021    EGFR 39 (L) 03/31/2021    EGFR 42 (L) 06/15/2020    CREATININE 1 31 (H) 06/03/2021    CREATININE 1 30 (H) 03/31/2021    CREATININE 1 24 (H) 06/15/2020     · Creatinine baseline 1 2-1 5 per nephrology in 2020  · Currently at baseline  Follow-up with Nephrology and PCP  · Avoid NSAIDs and contrast       Hypothyroidism  Assessment & Plan  Continue pre-hospital levothyroxine 25 mcg p o  Daily    Hypertension  Assessment & Plan  Resume prehospital Norvasc 10 mg daily  Noted lisinopril discontinued due to hyperkalemia in 2020  Documented she was on hydrochlorothiazide 25 mg however not actively in medical history  Patient is not aware of her current medications that she takes  Dyslipidemia  Assessment & Plan  Resume prehospital Lipitor 40 mg p o  Daily      VTE Prophylaxis: Heparin  / sequential compression device   Code Status:  Full code  POLST: POLST form is not discussed and not completed at this time  Discussion with family:  Discussed with patient at bedside  Anticipated Length of Stay:  Patient will be admitted on an Observation basis with an anticipated length of stay of  less than 2 midnights  Justification for Hospital Stay:  Left shoulder pain  Total Time for Visit, including Counseling / Coordination of Care: 60 minutes  Greater than 50% of this total time spent on direct patient counseling and coordination of care  Chief Complaint:   Left shoulder pain  History of Present Illness:    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 who presents with past medical history of diabetes mellitus type 2 not on insulin, hypertension, CKD stage 3 a and hyperlipidemia presented with left shoulder pain for 3 weeks  Professional  service through iPad used [employer] # [id number])  She has been having left shoulder pain around the scapula for the past 3 weeks associated with numbness in the forearm and rink and 5th fingers  Her pain is worse when she laid down on her left side and improved with pain medication she received in the ED  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 and her pain worst yesterday  She denied having any chest pain  She had a negative dobutamine stress test in 2020  She denied smoking or premature CAD in her family  She denied having nausea, vomiting, shortness of breath or diaphoresis  Upon arrival to the ED CTA for PE was negative and initial troponin normal     Review of Systems:    Review of Systems   Constitutional: Negative for chills and fever  HENT: Negative for rhinorrhea and sore throat  Eyes: Negative for pain and visual disturbance  Respiratory: Negative for cough and shortness of breath  Cardiovascular: Negative for chest pain and palpitations  Gastrointestinal: Negative for nausea and vomiting  Genitourinary: Negative for difficulty urinating, dysuria and hematuria  Musculoskeletal: Negative for joint swelling and neck pain  Skin: Negative for color change and rash  Neurological: Negative for seizures and light-headedness  Psychiatric/Behavioral: Negative for confusion and hallucinations  All other systems reviewed and are negative  Past Medical and Surgical History:     Past Medical History:   Diagnosis Date    Diabetes mellitus ([facility] [address] )     Heart abnormality     Hyperlipidemia     Hypertension     Renal disorder        Past Surgical History:   Procedure Laterality Date     SECTION         Meds/Allergies:    Prior to Admission medications    Medication Sig Start Date End Date Taking?  Authorizing Provider   acetaminophen (TYLENOL) 500 mg tablet Take 1 tablet (500 mg total) by mouth every 6 (six) hours as needed for mild pain 10/11/19   Prachi Pereyra, DO   amLODIPine (NORVASC) 10 mg tablet Take 1 tablet (10 mg total) by mouth daily 10/11/19   Prachi Pereyra, DO   aspirin 81 mg chewable tablet Chew 1 tablet (81 mg total) daily 10/11/19   Prachi Pereyra, DO   atorvastatin (LIPITOR) 40 mg tablet Take 1 tablet (40 mg total) by mouth daily 10/11/19   Prachi Pereyra, DO   levothyroxine 25 mcg tablet Take 1 tablet (25 mcg total) by mouth daily 10/11/19   Prachi Pereyra, DO   lidocaine (XYLOCAINE) 5 % ointment Apply topically as needed for mild pain 10/11/19   Prachi Pereyra, DO   lisinopril (ZESTRIL) 20 mg tablet Take 1 tablet (20 mg total) by mouth daily 10/11/19   Emily Lorenzana DO   methocarbamol (ROBAXIN) 500 mg tablet Take 1 tablet (500 mg total) by mouth 2 (two) times a day 4/2/19   Omar Gil PA-C     I have reviewed home medications with a medical source (PCP, Pharmacy, other)  Allergies: No Known Allergies    Social History:     Marital Status: Single   Occupation:  Nurse aide  Patient Pre-hospital Living Situation:   Patient Pre-hospital Level of Mobility:  Independent  Patient Pre-hospital Diet Restrictions:  No restriction reported  Substance Use History:   Social History     Substance and Sexual Activity   Alcohol Use No     Social History     Tobacco Use   Smoking Status Never Smoker   Smokeless Tobacco Never Used     Social History     Substance and Sexual Activity   Drug Use No       Family History:    No family history of premature CAD    Physical Exam:     Vitals:   Blood Pressure: 160/72 (06/04/21 0700)  Pulse: 55 (06/04/21 0700)  Temperature: (!) 97 °F (36 1 °C) (06/04/21 0700)  Temp Source: Temporal (06/04/21 0700)  Respirations: 20 (06/04/21 0700)  Height: 5' 2" (157 5 cm) (06/04/21 0700)  Weight - Scale: 63 2 kg (139 lb 5 3 oz) (06/04/21 0700)  SpO2: 100 % (06/04/21 0700)    Physical Exam  Vitals signs reviewed  Constitutional:       General: She is not in acute distress  Appearance: Normal appearance  She is not ill-appearing, toxic-appearing or diaphoretic  HENT:      Head: Normocephalic and atraumatic  Right Ear: External ear normal       Left Ear: External ear normal       Nose: Nose normal  No rhinorrhea  Mouth/Throat:      Mouth: Mucous membranes are moist       Pharynx: Oropharynx is clear  No oropharyngeal exudate  Eyes:      General:         Right eye: No discharge  Left eye: No discharge  Neck:      Musculoskeletal: Neck supple  Cardiovascular:      Rate and Rhythm: Normal rate and regular rhythm  Heart sounds: Normal heart sounds  No murmur     Pulmonary:      Effort: Pulmonary effort is normal  No respiratory distress  Breath sounds: Normal breath sounds  No wheezing or rales  Abdominal:      General: Bowel sounds are normal  There is no distension  Palpations: Abdomen is soft  Tenderness: There is no abdominal tenderness  There is no guarding  Musculoskeletal:      Right lower leg: No edema  Left lower leg: No edema  Comments: Left shoulder pain elicited by abduction and flexion  Neurological:      General: No focal deficit present  Mental Status: She is alert and oriented to person, place, and time  Psychiatric:         Mood and Affect: Mood normal          Behavior: Behavior normal          Additional Data:     Lab Results: I have personally reviewed pertinent reports  Results from last 7 days   Lab Units 06/03/21  1721   WBC Thousand/uL 8 10   HEMOGLOBIN g/dL 11 0*   HEMATOCRIT % 32 4*   PLATELETS Thousands/uL 269   NEUTROS PCT % 49   LYMPHS PCT % 40   MONOS PCT % 8   EOS PCT % 3     Results from last 7 days   Lab Units 06/03/21  1721   SODIUM mmol/L 139   POTASSIUM mmol/L 4 7   CHLORIDE mmol/L 104   CO2 mmol/L 27   BUN mg/dL 22   CREATININE mg/dL 1 31*   ANION GAP mmol/L 8   CALCIUM mg/dL 9 7   ALBUMIN g/dL 4 2   TOTAL BILIRUBIN mg/dL 0 23   ALK PHOS U/L 98   ALT U/L 46*   AST U/L 68*   GLUCOSE RANDOM mg/dL 80         Results from last 7 days   Lab Units 06/03/21  2116 06/03/21  1652   POC GLUCOSE mg/dl 83 79               Imaging: I have personally reviewed pertinent films in PACS    CTA ED chest PE Study   Final Result by Amanuel Bermeo MD (06/03 1907)      1  No evidence of pulmonary embolus  2  Scattered patchy ground glass opacities and mild interlobular septal thickening suggests mild CHF  3   Scattered subcentimeter pulmonary nodules, stable from 2019 compatible with benign findings  4   New prominent right axillary lymph node measures 1 3 x 1 2 cm  Suggestion of surrounding infiltrative changes   While this could be inflammatory, pathologic lymph node is not excluded  Recommend follow-up ultrasound in 2-3 weeks  The study was marked in [facility] for immediate notification  Workstation performed: [id number]         XR shoulder 2+ views LEFT    (Results Pending)       EKG, Pathology, and Other Studies Reviewed on Admission:   · EKG:  Sinus bradycardia  Allscripts / Epic Records Reviewed: Yes     ** Please Note: This note has been constructed using a voice recognition system   **

## 2021-06-04 NOTE — ASSESSMENT & PLAN NOTE
· Three weeks around the left scapula radiate to the forearm associated with numbness in the 4th and 5th fingers  · Differential include left shoulder impingement syndrome, frozen shoulder syndrome versus less likely atypical CAD  · On exam intact range of motion however noticed aggravation of left shoulder pain with flexion and extension as well as abduction  No obvious deformity  · Trend cardiac enzymes x3 normal  · Obtain serial EKGs x3 no ischemic changes  · Reviewed x-ray no obvious fractures per my read  · CT AP study no PE  · Start Voltaren gel 4 times a day  · Discharge home with referral to orthopedic surgery for possible intra-articular steroid injections

## 2021-06-04 NOTE — INCIDENTAL FINDINGS
The following findings require follow up:  Radiographic finding   Finding:  Right axillary lymph nodes   Follow up required:  Ultrasound right axilla   Follow up should be done within 2 week(s)    Please notify the following clinician to assist with the follow up:   Carlos Cloud MD

## 2021-06-04 NOTE — ASSESSMENT & PLAN NOTE
Lab Results   Component Value Date    EGFR 39 (L) 06/03/2021    EGFR 39 (L) 03/31/2021    EGFR 42 (L) 06/15/2020    CREATININE 1 31 (H) 06/03/2021    CREATININE 1 30 (H) 03/31/2021    CREATININE 1 24 (H) 06/15/2020     · Creatinine baseline 1 2-1 5 per nephrology in 2020  · Currently at baseline  Follow-up with Nephrology and PCP    · Avoid NSAIDs and contrast

## 2021-06-04 NOTE — PLAN OF CARE
Problem: Potential for Falls  Goal: Patient will remain free of falls  Description: INTERVENTIONS:  - Assess patient frequently for physical needs  -  Identify cognitive and physical deficits and behaviors that affect risk of falls    -  [place] fall precautions as indicated by assessment   - Educate patient/family on patient safety including physical limitations  - Instruct patient to call for assistance with activity based on assessment  - Modify environment to reduce risk of injury  - Consider OT/PT consult to assist with strengthening/mobility  Outcome: Progressing     Problem: Prexisting or High Potential for Compromised Skin Integrity  Goal: Skin integrity is maintained or improved  Description: INTERVENTIONS:  - Identify patients at risk for skin breakdown  - Assess and monitor skin integrity  - Assess and monitor nutrition and hydration status  - Monitor labs   - Assess for incontinence   - Turn and reposition patient  - Assist with mobility/ambulation  - Relieve pressure over bony prominences  - Avoid friction and shearing  - Provide appropriate hygiene as needed including keeping skin clean and dry  - Evaluate need for skin moisturizer/barrier cream  - Collaborate with interdisciplinary team   - Patient/family teaching  - Consider wound care consult   Outcome: Progressing     Problem: PAIN - ADULT  Goal: Verbalizes/displays adequate comfort level or baseline comfort level  Description: Interventions:  - Encourage patient to monitor pain and request assistance  - Assess pain using appropriate pain scale  - Administer analgesics based on type and severity of pain and evaluate response  - Implement non-pharmacological measures as appropriate and evaluate response  - Consider cultural and social influences on pain and pain management  - Notify physician/advanced practitioner if interventions unsuccessful or patient reports new pain  Outcome: Progressing     Problem: INFECTION - ADULT  Goal: Absence or prevention of progression during hospitalization  Description: INTERVENTIONS:  - Assess and monitor for signs and symptoms of infection  - Monitor lab/diagnostic results  - Monitor all insertion sites, i e  indwelling lines, tubes, and drains  - Monitor endotracheal if appropriate and nasal secretions for changes in amount and color  - [place] appropriate cooling/warming therapies per order  - Administer medications as ordered  - Instruct and encourage patient and family to use good hand hygiene technique  - Identify and instruct in appropriate isolation precautions for identified infection/condition  Outcome: Progressing  Goal: Absence of fever/infection during neutropenic period  Description: INTERVENTIONS:  - Monitor WBC    Outcome: Progressing     Problem: SAFETY ADULT  Goal: Patient will remain free of falls  Description: INTERVENTIONS:  - Assess patient frequently for physical needs  -  Identify cognitive and physical deficits and behaviors that affect risk of falls    -  [place] fall precautions as indicated by assessment   - Educate patient/family on patient safety including physical limitations  - Instruct patient to call for assistance with activity based on assessment  - Modify environment to reduce risk of injury  - Consider OT/PT consult to assist with strengthening/mobility  Outcome: Progressing  Goal: Maintain or return to baseline ADL function  Description: INTERVENTIONS:  -  Assess patient's ability to carry out ADLs; assess patient's baseline for ADL function and identify physical deficits which impact ability to perform ADLs (bathing, care of mouth/teeth, toileting, grooming, dressing, etc )  - Assess/evaluate cause of self-care deficits   - Assess range of motion  - Assess patient's mobility; develop plan if impaired  - Assess patient's need for assistive devices and provide as appropriate  - Encourage maximum independence but intervene and supervise when necessary  - Involve family in performance of ADLs  - Assess for home care needs following discharge   - Consider OT consult to assist with ADL evaluation and planning for discharge  - Provide patient education as appropriate  Outcome: Progressing  Goal: Maintain or return mobility status to optimal level  Description: INTERVENTIONS:  - Assess patient's baseline mobility status (ambulation, transfers, stairs, etc )    - Identify cognitive and physical deficits and behaviors that affect mobility  - Identify mobility aids required to assist with transfers and/or ambulation (gait belt, sit-to-stand, lift, walker, cane, etc )  - [place] fall precautions as indicated by assessment  - Record patient progress and toleration of activity level on Mobility SBAR; progress patient to next Phase/Stage  - Instruct patient to call for assistance with activity based on assessment  - Consider rehabilitation consult to assist with strengthening/weightbearing, etc   Outcome: Progressing

## 2021-06-04 NOTE — DISCHARGE INSTRUCTIONS
Dolor de hombro   CUIDADO AMBULATORIO:   El dolor de hombro es un problema común que puede afectar wilber actividades diarias  El dolor puede ser causado por un problema en etienne hombro, kenneth la irritación de un tendón o bursa  Los tendones son cuerdas de tejido resistente que [employer] músculos a los [place]  La bursa es castro bolsa (saco) llena de líquido que actúa kenneth colchón entre un hueso y un tendón  El dolor de hombro también puede ser causado por el dolor que se propaga hacia etienne hombro de otra parte de etienne cuerpo  Busque atención médica de inmediato si:  · Usted tiene dolor intenso  · Usted no puede  etienne brazo ni etienne hombro  · Usted tiene entumecimiento u hormigueo en el hombro o en el brazo  Comuníquese con etienne médico si:  · Etienne dolor empeora o no desaparece con el tratamiento  · Usted tiene dificultad para  el brazo o el hombro  · Usted tiene preguntas o inquietudes acerca de etienne condición o cuidado  El tratamiento para el dolor de hombro: podría incluir cualquiera de los siguientes:  · Acetaminofén mariya el dolor y baja la fiebre  Está disponible sin receta médica  Pregunte la cantidad y la frecuencia con que debe tomarlos  [address]  Hilaria las etiquetas de todos los demás medicamentos que esté usando para saber si también contienen acetaminofén, o pregunte a etienne médico o farmacéutico  El acetaminofén puede causar daño en el hígado cuando no se jorge l de forma correcta  No use más de 4 gramos (4000 miligramos) en total de acetaminofeno en un día  · Los [place], kenneth el ibuprofeno, Ples Carton a disminuir la inflamación, el dolor y la [place]  Paulette medicamento está disponible con o sin castro receta médica  Los LANDON pueden causar sangrado estomacal o problemas renales en ciertas personas  Si usted jorge l un medicamento anticoagulante, siempre pregúntele a etienne médico si los LANDON son seguros para usted  Siempre hilaria la etiqueta de paulette medicamento y [place] instrucciones      · Castro inyección de esteroides podrían ayudar a reducir el dolor y la inflamación  · La cirugía puede ser necesaria para un dolor a maddi plazo y por la pérdida de función  El manejo de wilber síntomas:  · Aplique hielo en el hombro de 20 a 30 minutos cada 2 horas o kenneth se lo indiquen  Use castro compresa de hielo o ponga hielo triturado en castro bolsa de plástico  Colen Shahram con castro toalla antes de aplicarla sobre el hombro  El hielo ayuda a evitar daño al tejido y a disminuir la inflamación y el dolor  · Use calor si el hielo no le está ayudando con wilber síntomas  Aplique calor sobre el hombro ashlie 20 a 30 minutos cada 2 horas ashlie tantos días kenneth le indiquen  El calor ayuda a disminuir el dolor y los espasmos musculares  · Limite las [address] kenneth se le indique  Trate de evitar movimientos repetidos arriba de la mary  · Asista a terapia física u ocupacional kenneth se le indique  Un fisioterapeuta le puede enseñar ejercicios para ayudarle a mejorar el movimiento y la fuerza, y para disminuir el dolor  Un terapeuta ocupacional le enseña habilidades para ayudarlo con wilber actividades diarias  Prevenga el dolor de hombro:  · Mantenga un buen rango de movimiento en el hombro  Pregúntele a etienne médico qué ejercicios debe hacer de forma regular después de dotty sanado  · Haciendo ejercicios de estiramiento y fortalecimiento para etienne hombro  Use la técnica apropiada ashlie los ejercicios y deportes  Corrie castro freda de seguimiento con etienne médico u ortopedista kenneth se le indique: Anote wilber preguntas para que se acuerde de hacerlas ashlie wilber visitas  © Copyright [address] Information is for End User's use only and may not be sold, redistributed or otherwise used for commercial purposes  All illustrations and images included in CareNotes® are the copyrighted property of A D A M , Inc  or [address] [place] [address] es sólo para uso en educación   Etienne intención no es darle un consejo [employer] enfermedades o tratamientos  Colsulte con etienne Chidi Cunningham farmacéutico antes de seguir cualquier régimen médico para saber si es seguro y efectivo para usted

## 2021-06-04 NOTE — NURSING NOTE
PT was D/C to home D/C instruction was given to PT and PT verbalized understanding of D/C instruction  All personal belonging was given to PT  IV was D/C   7 T staff accompany PT to lobby

## 2021-06-04 NOTE — ASSESSMENT & PLAN NOTE
Lab Results   Component Value Date    HGBA1C 7 7 (H) 06/15/2020       Recent Labs     06/03/21  1652 06/03/21  2116   POCGLU 79 83       Blood Sugar Average: Last 72 hrs:  (P) 81     · Will place on [facility] step 2 diet  · Patient does not appear to be on any antihyperglycemics at home  · Will obtain Accu-Cheks AC and HS with Humalog correction dose a c   And hs

## 2021-06-04 NOTE — DISCHARGE SUMMARY
[facility]  Discharge- Gabrielle Duke 1940, 80 y o  female MRN: [id number]  Unit/Bed#: 7T [facility] 712-02 Encounter: [id number]  Primary Care Provider: Maximilian Oliva MD   Date and time admitted to hospital: 6/3/2021  4:40 PM    * Left shoulder pain  Assessment & Plan  · Three weeks around the left scapula radiate to the forearm associated with numbness in the 4th and 5th fingers  · Differential include left shoulder impingement syndrome, frozen shoulder syndrome versus less likely atypical CAD  · On exam intact range of motion however noticed aggravation of left shoulder pain with flexion and extension as well as abduction  No obvious deformity  · Trend cardiac enzymes x3 normal  · Obtain serial EKGs x3 no ischemic changes  · Reviewed x-ray no obvious fractures per my read  · CT AP study no PE  · Start Voltaren gel 4 times a day  · Discharge home with referral to orthopedic surgery for possible intra-articular steroid injections  Type 2 diabetes mellitus with diabetic chronic kidney disease [facility])  Assessment & Plan  Lab Results   Component Value Date    HGBA1C 7 7 (H) 06/15/2020       Recent Labs     06/03/21  1652 06/03/21  2116   POCGLU 79 83       Blood Sugar Average: Last 72 hrs:  (P) 81     · Will place on [facility] step 2 diet  · Patient does not appear to be on any antihyperglycemics at home  · Will obtain Accu-Cheks AC and HS with Humalog correction dose a c  And hs    Stage 3 chronic kidney disease [facility])  Assessment & Plan  Lab Results   Component Value Date    EGFR 39 (L) 06/03/2021    EGFR 39 (L) 03/31/2021    EGFR 42 (L) 06/15/2020    CREATININE 1 31 (H) 06/03/2021    CREATININE 1 30 (H) 03/31/2021    CREATININE 1 24 (H) 06/15/2020     · Creatinine baseline 1 2-1 5 per nephrology in 2020  · Currently at baseline  Follow-up with Nephrology and PCP  · Avoid NSAIDs and contrast       Hypothyroidism  Assessment & Plan  Continue pre-hospital levothyroxine 25 mcg p o  Daily    Hypertension  Assessment & Plan  Resume prehospital Norvasc 10 mg daily  Noted lisinopril discontinued due to hyperkalemia in 2020  Documented she was on hydrochlorothiazide 25 mg however not actively in medical history  Patient is not aware of her current medications that she takes  Dyslipidemia  Assessment & Plan  Resume prehospital Lipitor 40 mg p o  Daily        Discharging Physician / Practitioner: Marcello Crowell MD  PCP: Maksim Carr MD  Admission Date:   Admission Orders (From admission, onward)     Ordered        06/03/21 2014  Place in Observation  Once                   Discharge Date: 06/04/21    Resolved Problems  Date Reviewed: 6/4/2021    None          Consultations During Hospital Stay:  · None    Procedures Performed:   · X-ray left shoulder  · CTA PE study    Significant Findings / Test Results:   · No pulmonary embolism and unremarkable chest x-ray  Incidental Findings:   · 1 3 x 1 2 centimetre right axillary lymph node Suggestion of surrounding infiltrative changes  While this could be inflammatory, pathologic lymph node is not excluded  Recommend follow-up ultrasound in 2-3 weeks  Test Results Pending at Discharge (will require follow up): · None     Outpatient Tests Requested:  · None    Complications:  None    Reason for Admission:  Left shoulder pain  Hospital Course:     Sierra Mike is a 80 y o  female patient who originally presented to the hospital on 6/3/2021 due to left shoulder pain  Troponin trended x3 with EKGs without evidence of acute coronary syndrome  Her left shoulder pain thought secondary to impingement syndrome given physical exam with pain reproducible with passive range of motion as well as active range of motion  Patient was referred to Orthopedic surgery for possible steroid in for injections and started on topical Voltaren gel for pain control      English interpretation facilitated by professional  Joceline Garcia # [id number])  All questions and concerns addressed  Patient was advised to follow-up with PCP in 1 week  Please see above list of diagnoses and related plan for additional information  Condition at Discharge: good     Discharge Day Visit / Exam:     * Please refer to separate progress note for these details *    Discussion with Family:  Discussed with patient  Discharge instructions/Information to patient and family:   See after visit summary for information provided to patient and family  Provisions for Follow-Up Care:  See after visit summary for information related to follow-up care and any pertinent home health orders  Disposition:     Home    For Discharges to [facility]:   · Not Applicable to this Patient - Not Applicable to this Patient    Planned Readmission:  None     Discharge Statement:  I spent 15 minutes discharging the patient  This time was spent on the day of discharge  I had direct contact with the patient on the day of discharge  Greater than 50% of the total time was spent examining patient, answering all patient questions, arranging and discussing plan of care with patient as well as directly providing post-discharge instructions  Additional time then spent on discharge activities  Discharge Medications:  See after visit summary for reconciled discharge medications provided to patient and family        ** Please Note: This note has been constructed using a voice recognition system **

## 2021-06-04 NOTE — ASSESSMENT & PLAN NOTE
Resume prehospital Norvasc 10 mg daily  Noted lisinopril discontinued due to hyperkalemia in 2020  Documented she was on hydrochlorothiazide 25 mg however not actively in medical history  Patient is not aware of her current medications that she takes

## 2021-06-04 NOTE — ED NOTES
Pt moved to ED bed 5 and placed on cardiac monitor  Pt states that she took her daily medications this morning        Ashley Dexter RN  06/03/21 2668

## 2021-06-04 NOTE — ASSESSMENT & PLAN NOTE
Lab Results   Component Value Date    HGBA1C 7 7 (H) 06/15/2020       Recent Labs     06/03/21  1652 06/03/21  2116   POCGLU 79 83       Blood Sugar Average: Last 72 hrs:  (P) 81     · Will place on [facility] step 2 diet  · Patient does not appear to be on any antihyperglycemics at home  · Will obtain Accu-Cheks AC and HS with Humalog correction dose a c   And hs

## 2021-06-14 ENCOUNTER — OFFICE VISIT (OUTPATIENT)
Dept: OBGYN CLINIC | Facility: HOSPITAL | Age: 81
End: 2021-06-14
Payer: COMMERCIAL

## 2021-06-14 VITALS
HEIGHT: 62 IN | BODY MASS INDEX: 25.58 KG/M2 | SYSTOLIC BLOOD PRESSURE: 136 MMHG | WEIGHT: 139 LBS | DIASTOLIC BLOOD PRESSURE: 74 MMHG

## 2021-06-14 DIAGNOSIS — M75.102 ROTATOR CUFF SYNDROME OF LEFT SHOULDER: Primary | ICD-10-CM

## 2021-06-14 DIAGNOSIS — M54.12 RADICULOPATHY, CERVICAL REGION: ICD-10-CM

## 2021-06-14 PROCEDURE — 99203 OFFICE O/P NEW LOW 30 MIN: CPT | Performed by: PHYSICIAN ASSISTANT

## 2021-06-14 NOTE — PROGRESS NOTES
Patient Name:  Gabrielle Duke  MRN:  [id number]    Assessment & Plan     Left shoulder rotator cuff tendinitis/bursitis, left-sided cervical radiculopathy  1  Referral to physical therapy  2  Prescription for meloxicam   Patient is unsure per pharmacy at the moment  She will contact the office with pharmacy information and a prescription will be sent electronically once the information is known  3  Work note provided with restrictions  4  Follow-up in six weeks with Dr Artis Vasquez    Chief Complaint     Left shoulder pain    History of the Present Illness     Gabrielle Duke is a 80 y o  female reports to the office today for initial evaluation of her left shoulder  She notes an onset of pain approximately six weeks ago  She denies any injury or trauma  Pain is localized to the lateral and posterior aspect of the shoulder  Pain is worse with movement of the arm specifically reaching overhead and behind her back  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  She denies any weakness  She denies instability  She does note numbness which radiates from the shoulder distally to the ring and small fingers  The numbness is not constant  She currently utilizes Voltaren gel without significant relief  No fevers or chills  She denies any cervical spine pain    Physical Exam     /74   Ht 5' 2" (1 575 m)   Wt 63 kg (139 lb)   BMI 25 42 kg/m²     Left shoulder:  No tenderness to palpation AC joint, long head biceps tendon, lateral shoulder, and posterior shoulder  PROM is , ER-abd 80, IR-abd 50  Impingement signs are positive  Positive empty can test   Positive speed's test   Negative cross-body adduction test   5/5 forward flexion strength  Eyes:  Anicteric sclerae  ENT:  Trachea midline  Lungs:  Normal respiratory effort  Cardiovascular:  Capillary refill is less than 2 seconds  Lymphatic:  No palpable lymphadenopathy    Skin:  Intact without erythema  Neurologic:  Sensation grossly intact to light touch  Psychiatric:  Mood and affect are appropriate  Data Review     I have personally reviewed pertinent films in PACS, and my interpretation follows:    X-rays left shoulder 6/3/21:  No acute osseous abnormalities  No significant degenerative changes  Past Medical History:   Diagnosis Date    Diabetes mellitus ([address] )     Heart abnormality     Hyperlipidemia     Hypertension     Renal disorder        Past Surgical History:   Procedure Laterality Date     SECTION         No Known Allergies    Current Outpatient Medications on File Prior to Visit   Medication Sig Dispense Refill    acetaminophen (TYLENOL) 500 mg tablet Take 1 tablet (500 mg total) by mouth every 6 (six) hours as needed for mild pain 30 tablet 0    amLODIPine (NORVASC) 10 mg tablet Take 1 tablet (10 mg total) by mouth daily 30 tablet 0    aspirin 81 mg chewable tablet Chew 1 tablet (81 mg total) daily 30 tablet 0    atorvastatin (LIPITOR) 40 mg tablet Take 1 tablet (40 mg total) by mouth daily 30 tablet 0    Diclofenac Sodium (VOLTAREN) 1 % Apply to left shoulder region 4 times aday  2 g 0    levothyroxine 25 mcg tablet Take 1 tablet (25 mcg total) by mouth daily 30 tablet 0    lidocaine (XYLOCAINE) 5 % ointment Apply topically as needed for mild pain 35 44 g 0    methocarbamol (ROBAXIN) 500 mg tablet Take 1 tablet (500 mg total) by mouth 2 (two) times a day 20 tablet 0     No current facility-administered medications on file prior to visit  Social History     Tobacco Use    Smoking status: Never Smoker    Smokeless tobacco: Never Used   Vaping Use    Vaping Use: Never used   Substance Use Topics    Alcohol use: Never    Drug use: Never       History reviewed  No pertinent family history  Review of Systems     As stated in the HPI  All other systems were reviewed and are negative

## 2021-06-14 NOTE — LETTER
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

## 2021-06-15 ENCOUNTER — TELEPHONE (OUTPATIENT)
Dept: OBGYN CLINIC | Facility: HOSPITAL | Age: 81
End: 2021-06-15

## 2021-06-16 NOTE — TELEPHONE ENCOUNTER
Please contact the patient  Let her know that her most recent labs at the beginning of this month show that her kidney function is decreased, her labs show that she has CKD, given that her GFR was at 39 I do not feel that NSAIDs would be appropriate for her    If she would like to speak to her PCP or nephrologist regarding NSAID use then she may contact them otherwise I would not provide a prescription NSAID for her and would not recommend she take any over-the-counter NSAIDs at this time

## 2021-06-16 NOTE — TELEPHONE ENCOUNTER
Spoke to patient and advised above via  #[phone]  Patient verbalized understanding  No further questions at this time

## 2021-09-23 ENCOUNTER — APPOINTMENT (OUTPATIENT)
Dept: LAB | Facility: HOSPITAL | Age: 81
End: 2021-09-23
Payer: COMMERCIAL

## 2021-09-23 DIAGNOSIS — I51.9 MYXEDEMA HEART DISEASE: ICD-10-CM

## 2021-09-23 DIAGNOSIS — E03.9 MYXEDEMA HEART DISEASE: ICD-10-CM

## 2021-09-23 LAB
ALBUMIN SERPL BCP-MCNC: 4.6 G/DL (ref 3–5.2)
ALP SERPL-CCNC: 89 U/L (ref 43–122)
ALT SERPL W P-5'-P-CCNC: 38 U/L
ANION GAP SERPL CALCULATED.3IONS-SCNC: 10 MMOL/L (ref 5–14)
AST SERPL W P-5'-P-CCNC: 47 U/L (ref 14–36)
BILIRUB SERPL-MCNC: 0.64 MG/DL
BUN SERPL-MCNC: 40 MG/DL (ref 5–25)
CALCIUM SERPL-MCNC: 9.9 MG/DL (ref 8.4–10.2)
CHLORIDE SERPL-SCNC: 101 MMOL/L (ref 97–108)
CHOLEST SERPL-MCNC: 148 MG/DL
CO2 SERPL-SCNC: 28 MMOL/L (ref 22–30)
CREAT SERPL-MCNC: 1.56 MG/DL (ref 0.6–1.2)
GFR SERPL CREATININE-BSD FRML MDRD: 31 ML/MIN/1.73SQ M
GLUCOSE P FAST SERPL-MCNC: 125 MG/DL (ref 70–99)
HDLC SERPL-MCNC: 35 MG/DL
LDLC SERPL CALC-MCNC: 91 MG/DL
NONHDLC SERPL-MCNC: 113 MG/DL
POTASSIUM SERPL-SCNC: 5 MMOL/L (ref 3.6–5)
PROT SERPL-MCNC: 7.9 G/DL (ref 5.9–8.4)
SODIUM SERPL-SCNC: 139 MMOL/L (ref 137–147)
TRIGL SERPL-MCNC: 109 MG/DL
TSH SERPL DL<=0.05 MIU/L-ACNC: 2.76 UIU/ML (ref 0.47–4.68)

## 2021-09-23 PROCEDURE — 84443 ASSAY THYROID STIM HORMONE: CPT

## 2021-09-23 PROCEDURE — 80061 LIPID PANEL: CPT

## 2021-09-23 PROCEDURE — 36415 COLL VENOUS BLD VENIPUNCTURE: CPT

## 2021-09-23 PROCEDURE — 80053 COMPREHEN METABOLIC PANEL: CPT

## 2021-11-26 ENCOUNTER — APPOINTMENT (OUTPATIENT)
Dept: LAB | Facility: HOSPITAL | Age: 81
End: 2021-11-26
Payer: COMMERCIAL

## 2021-11-26 DIAGNOSIS — E13.69 OTHER SPECIFIED DIABETES MELLITUS WITH OTHER SPECIFIED COMPLICATION, UNSPECIFIED WHETHER LONG TERM INSULIN USE (HCC): ICD-10-CM

## 2021-11-26 LAB
ALBUMIN SERPL BCP-MCNC: 4.5 G/DL (ref 3–5.2)
ALP SERPL-CCNC: 83 U/L (ref 43–122)
ALT SERPL W P-5'-P-CCNC: 24 U/L
ANION GAP SERPL CALCULATED.3IONS-SCNC: 8 MMOL/L (ref 5–14)
AST SERPL W P-5'-P-CCNC: 37 U/L (ref 14–36)
BILIRUB SERPL-MCNC: 0.67 MG/DL
BUN SERPL-MCNC: 21 MG/DL (ref 5–25)
CALCIUM SERPL-MCNC: 9.6 MG/DL (ref 8.4–10.2)
CHLORIDE SERPL-SCNC: 101 MMOL/L (ref 97–108)
CO2 SERPL-SCNC: 29 MMOL/L (ref 22–30)
CREAT SERPL-MCNC: 1.33 MG/DL (ref 0.6–1.2)
GFR SERPL CREATININE-BSD FRML MDRD: 38 ML/MIN/1.73SQ M
GLUCOSE P FAST SERPL-MCNC: 127 MG/DL (ref 70–99)
POTASSIUM SERPL-SCNC: 4.7 MMOL/L (ref 3.6–5)
PROT SERPL-MCNC: 8.2 G/DL (ref 5.9–8.4)
SODIUM SERPL-SCNC: 138 MMOL/L (ref 137–147)

## 2021-11-26 PROCEDURE — 36415 COLL VENOUS BLD VENIPUNCTURE: CPT

## 2021-11-26 PROCEDURE — 80053 COMPREHEN METABOLIC PANEL: CPT

## 2022-02-01 ENCOUNTER — APPOINTMENT (OUTPATIENT)
Dept: LAB | Facility: HOSPITAL | Age: 82
End: 2022-02-01
Payer: COMMERCIAL

## 2022-02-01 DIAGNOSIS — E11.9 TYPE 2 DIABETES MELLITUS WITHOUT COMPLICATION, UNSPECIFIED WHETHER LONG TERM INSULIN USE (HCC): ICD-10-CM

## 2022-02-01 DIAGNOSIS — E03.9 HYPOTHYROIDISM, UNSPECIFIED TYPE: ICD-10-CM

## 2022-02-01 LAB
ALBUMIN SERPL BCP-MCNC: 4.3 G/DL (ref 3–5.2)
ALP SERPL-CCNC: 70 U/L (ref 43–122)
ALT SERPL W P-5'-P-CCNC: 23 U/L
ANION GAP SERPL CALCULATED.3IONS-SCNC: 9 MMOL/L (ref 5–14)
AST SERPL W P-5'-P-CCNC: 39 U/L (ref 14–36)
BILIRUB SERPL-MCNC: 0.48 MG/DL
BUN SERPL-MCNC: 21 MG/DL (ref 5–25)
CALCIUM SERPL-MCNC: 9.2 MG/DL (ref 8.4–10.2)
CHLORIDE SERPL-SCNC: 103 MMOL/L (ref 97–108)
CO2 SERPL-SCNC: 27 MMOL/L (ref 22–30)
CREAT SERPL-MCNC: 1.23 MG/DL (ref 0.6–1.2)
GFR SERPL CREATININE-BSD FRML MDRD: 41 ML/MIN/1.73SQ M
GLUCOSE P FAST SERPL-MCNC: 113 MG/DL (ref 70–99)
POTASSIUM SERPL-SCNC: 4.6 MMOL/L (ref 3.6–5)
PROT SERPL-MCNC: 7.7 G/DL (ref 5.9–8.4)
SODIUM SERPL-SCNC: 139 MMOL/L (ref 137–147)
TSH SERPL DL<=0.05 MIU/L-ACNC: 4.43 UIU/ML (ref 0.47–4.68)

## 2022-02-01 PROCEDURE — 84443 ASSAY THYROID STIM HORMONE: CPT

## 2022-02-01 PROCEDURE — 36415 COLL VENOUS BLD VENIPUNCTURE: CPT

## 2022-02-01 PROCEDURE — 80053 COMPREHEN METABOLIC PANEL: CPT

## 2022-07-03 ENCOUNTER — HOSPITAL ENCOUNTER (EMERGENCY)
Facility: HOSPITAL | Age: 82
Discharge: HOME | End: 2022-07-03
Attending: EMERGENCY MEDICINE
Payer: COMMERCIAL

## 2022-07-03 VITALS
HEART RATE: 63 BPM | RESPIRATION RATE: 17 BRPM | OXYGEN SATURATION: 99 % | TEMPERATURE: 98 F | DIASTOLIC BLOOD PRESSURE: 82 MMHG | WEIGHT: 144.7 LBS | SYSTOLIC BLOOD PRESSURE: 145 MMHG | BODY MASS INDEX: 26.47 KG/M2

## 2022-07-03 DIAGNOSIS — R10.13 EPIGASTRIC PAIN: Primary | ICD-10-CM

## 2022-07-03 LAB
ALBUMIN SERPL BCP-MCNC: 4.7 G/DL (ref 3–5.2)
ALP SERPL-CCNC: 81 U/L (ref 43–122)
ALT SERPL W P-5'-P-CCNC: 25 U/L
ANION GAP SERPL CALCULATED.3IONS-SCNC: 12 MMOL/L (ref 5–14)
AST SERPL W P-5'-P-CCNC: 52 U/L (ref 14–36)
ATRIAL RATE: 55 BPM
BASOPHILS # BLD AUTO: 0.04 THOUSANDS/ΜL (ref 0–0.1)
BASOPHILS NFR BLD AUTO: 1 % (ref 0–1)
BILIRUB SERPL-MCNC: 0.74 MG/DL
BUN SERPL-MCNC: 38 MG/DL (ref 5–25)
CALCIUM SERPL-MCNC: 9.3 MG/DL (ref 8.4–10.2)
CARDIAC TROPONIN I PNL SERPL HS: <2 NG/L (ref 8–18)
CHLORIDE SERPL-SCNC: 101 MMOL/L (ref 97–108)
CO2 SERPL-SCNC: 23 MMOL/L (ref 22–30)
CREAT SERPL-MCNC: 1.37 MG/DL (ref 0.6–1.2)
EOSINOPHIL # BLD AUTO: 0.19 THOUSAND/ΜL (ref 0–0.61)
EOSINOPHIL NFR BLD AUTO: 2 % (ref 0–6)
ERYTHROCYTE [DISTWIDTH] IN BLOOD BY AUTOMATED COUNT: 13.3 % (ref 11.6–15.1)
GFR SERPL CREATININE-BSD FRML MDRD: 36 ML/MIN/1.73SQ M
GLUCOSE SERPL-MCNC: 137 MG/DL (ref 70–99)
HCT VFR BLD AUTO: 36.5 % (ref 34.8–46.1)
HGB BLD-MCNC: 11.5 G/DL (ref 11.5–15.4)
IMM GRANULOCYTES # BLD AUTO: 0.02 THOUSAND/UL (ref 0–0.2)
IMM GRANULOCYTES NFR BLD AUTO: 0 % (ref 0–2)
LIPASE SERPL-CCNC: 255 U/L (ref 23–300)
LYMPHOCYTES # BLD AUTO: 3.29 THOUSANDS/ΜL (ref 0.6–4.47)
LYMPHOCYTES NFR BLD AUTO: 39 % (ref 14–44)
MCH RBC QN AUTO: 29.2 PG (ref 26.8–34.3)
MCHC RBC AUTO-ENTMCNC: 31.5 G/DL (ref 31.4–37.4)
MCV RBC AUTO: 93 FL (ref 82–98)
MONOCYTES # BLD AUTO: 0.63 THOUSAND/ΜL (ref 0.17–1.22)
MONOCYTES NFR BLD AUTO: 7 % (ref 4–12)
NEUTROPHILS # BLD AUTO: 4.37 THOUSANDS/ΜL (ref 1.85–7.62)
NEUTS SEG NFR BLD AUTO: 51 % (ref 43–75)
NRBC BLD AUTO-RTO: 0 /100 WBCS
P AXIS: 34 DEGREES
PLATELET # BLD AUTO: 309 THOUSANDS/UL (ref 149–390)
PMV BLD AUTO: 9.5 FL (ref 8.9–12.7)
POTASSIUM SERPL-SCNC: 5.3 MMOL/L (ref 3.6–5)
PR INTERVAL: 228 MS
PROT SERPL-MCNC: 8.5 G/DL (ref 5.9–8.4)
QRS AXIS: -3 DEGREES
QRSD INTERVAL: 86 MS
QT INTERVAL: 394 MS
QTC INTERVAL: 376 MS
RBC # BLD AUTO: 3.94 MILLION/UL (ref 3.81–5.12)
SODIUM SERPL-SCNC: 136 MMOL/L (ref 137–147)
T WAVE AXIS: 45 DEGREES
VENTRICULAR RATE: 55 BPM
WBC # BLD AUTO: 8.54 THOUSAND/UL (ref 4.31–10.16)

## 2022-07-03 PROCEDURE — 80053 COMPREHEN METABOLIC PANEL: CPT | Performed by: EMERGENCY MEDICINE

## 2022-07-03 PROCEDURE — 99285 EMERGENCY DEPT VISIT HI MDM: CPT | Performed by: EMERGENCY MEDICINE

## 2022-07-03 PROCEDURE — 85025 COMPLETE CBC W/AUTO DIFF WBC: CPT | Performed by: EMERGENCY MEDICINE

## 2022-07-03 PROCEDURE — 99284 EMERGENCY DEPT VISIT MOD MDM: CPT

## 2022-07-03 PROCEDURE — 96374 THER/PROPH/DIAG INJ IV PUSH: CPT

## 2022-07-03 PROCEDURE — 93010 ELECTROCARDIOGRAM REPORT: CPT | Performed by: INTERNAL MEDICINE

## 2022-07-03 PROCEDURE — 93005 ELECTROCARDIOGRAM TRACING: CPT

## 2022-07-03 PROCEDURE — 36415 COLL VENOUS BLD VENIPUNCTURE: CPT | Performed by: EMERGENCY MEDICINE

## 2022-07-03 PROCEDURE — 83690 ASSAY OF LIPASE: CPT | Performed by: EMERGENCY MEDICINE

## 2022-07-03 PROCEDURE — 84484 ASSAY OF TROPONIN QUANT: CPT | Performed by: EMERGENCY MEDICINE

## 2022-07-03 RX ORDER — ONDANSETRON 4 MG/1
4 TABLET, ORALLY DISINTEGRATING ORAL EVERY 6 HOURS PRN
Qty: 20 TABLET | Refills: 0 | Status: SHIPPED | OUTPATIENT
Start: 2022-07-03

## 2022-07-03 RX ORDER — PSEUDOEPHEDRINE HCL 30 MG
100 TABLET ORAL
COMMUNITY

## 2022-07-03 RX ORDER — ATORVASTATIN CALCIUM 80 MG/1
80 TABLET, FILM COATED ORAL
COMMUNITY

## 2022-07-03 RX ORDER — AMLODIPINE BESYLATE 2.5 MG/1
5 TABLET ORAL
COMMUNITY

## 2022-07-03 RX ORDER — DOCUSATE SODIUM 100 MG/1
100 CAPSULE, LIQUID FILLED ORAL EVERY 12 HOURS
Qty: 60 CAPSULE | Refills: 0 | Status: SHIPPED | OUTPATIENT
Start: 2022-07-03

## 2022-07-03 RX ORDER — HYDROCHLOROTHIAZIDE 25 MG/1
25 TABLET ORAL
COMMUNITY

## 2022-07-03 RX ORDER — LEVOTHYROXINE SODIUM 25 UG/1
25 CAPSULE ORAL
COMMUNITY

## 2022-07-03 RX ORDER — MORPHINE SULFATE 4 MG/ML
4 INJECTION, SOLUTION INTRAMUSCULAR; INTRAVENOUS ONCE
Status: COMPLETED | OUTPATIENT
Start: 2022-07-03 | End: 2022-07-03

## 2022-07-03 RX ADMIN — MORPHINE SULFATE 4 MG: 4 INJECTION INTRAVENOUS at 19:00

## 2022-07-03 NOTE — ED PROVIDER NOTES
History  Chief Complaint   Patient presents with    Abdominal Pain     Right upper abdominal pain started yesterday around 2200  Denies diarrhea, complains of nausea and 1 episode of vomiting today around noon  Pain goes from right upper abdomen to the right rib cage  Took pain reliever from [employer]  Patient is an 46-year-old female who presents with a 2 day history of ruq pain  Started last night  Constant pain  Associated n/v today  One episode of emesis  No bile no blood  No diarrhea  Took some pain medication from  today with some relief  Denies any nausea now  No sick contacts, questionable food exposure, or recent antibiotics  Prior to Admission Medications   Prescriptions Last Dose Informant Patient Reported? Taking? Aspirin 81 MG CAPS   Yes No   Sig: Take 81 mg by mouth daily   Diclofenac Sodium (VOLTAREN) 1 %   No No   Sig: Apply to left shoulder region 4 times aday  Docusate Sodium (DSS) 100 MG CAPS   Yes No   Sig: Take 100 mg by mouth   Levothyroxine Sodium 25 MCG CAPS   Yes No   Sig: Take 25 mcg by mouth   amLODIPine (NORVASC) 10 mg tablet   No No   Sig: Take 1 tablet (10 mg total) by mouth daily   amLODIPine (NORVASC) 2 5 mg tablet   Yes No   Sig: Take 5 mg by mouth   atorvastatin (LIPITOR) 80 mg tablet   Yes No   Sig: Take 80 mg by mouth   hydrochlorothiazide (HYDRODIURIL) 25 mg tablet   Yes No   Sig: Take 25 mg by mouth   metFORMIN (GLUCOPHAGE) 500 mg tablet   Yes Yes   Sig: Take 1 tablet by mouth every 12 (twelve) hours      Facility-Administered Medications: None       Past Medical History:   Diagnosis Date    Diabetes mellitus ([address] )     Heart abnormality     Hyperlipidemia     Hypertension     Renal disorder        Past Surgical History:   Procedure Laterality Date     SECTION         No family history on file  I have reviewed and agree with the history as documented      E-Cigarette/Vaping    E-Cigarette Use Never User E-Cigarette/Vaping Substances    Nicotine No     THC No     CBD No     Flavoring No     Other No     Unknown No      Social History     Tobacco Use    Smoking status: Never Smoker    Smokeless tobacco: Never Used   Vaping Use    Vaping Use: Never used   Substance Use Topics    Alcohol use: Never    Drug use: Never       Review of Systems   Constitutional: Positive for appetite change  Negative for fever  HENT: Negative  Eyes: Negative  Respiratory: Negative  Cardiovascular: Negative  Gastrointestinal: Positive for abdominal pain, nausea and vomiting  Endocrine: Negative  Genitourinary: Negative  Musculoskeletal: Negative  Skin: Negative  Allergic/Immunologic: Negative  Neurological: Negative  Hematological: Negative  Psychiatric/Behavioral: Negative  All other systems reviewed and are negative  Physical Exam  Physical Exam  Vitals and nursing note reviewed  Constitutional:       Appearance: She is well-developed and normal weight  HENT:      Head: Normocephalic and atraumatic  Mouth/Throat:      Mouth: Mucous membranes are moist    Cardiovascular:      Rate and Rhythm: Normal rate and regular rhythm  Heart sounds: Normal heart sounds  Pulmonary:      Effort: Pulmonary effort is normal       Breath sounds: Normal breath sounds  Abdominal:      General: Abdomen is flat  Palpations: Abdomen is soft  Tenderness: There is abdominal tenderness in the right upper quadrant and epigastric area  There is no right CVA tenderness, left CVA tenderness, guarding or rebound  Negative signs include Roger's sign and McBurney's sign  Skin:     General: Skin is warm and dry  Neurological:      General: No focal deficit present  Mental Status: She is alert and oriented to person, place, and time     Psychiatric:         Mood and Affect: Mood normal          Behavior: Behavior normal          Vital Signs  ED Triage Vitals [07/03/22 1841] Temperature Pulse Respirations Blood Pressure SpO2   98 °F (36 7 °C) 63 17 145/82 99 %      Temp Source Heart Rate Source Patient Position - Orthostatic VS BP Location FiO2 (%)   Tympanic Monitor Sitting Left arm --      Pain Score       8           Vitals:    07/03/22 1841   BP: 145/82   Pulse: 63   Patient Position - Orthostatic VS: Sitting         Visual Acuity      ED Medications  Medications   morphine injection 4 mg (4 mg Intravenous Given 7/3/22 1900)       Diagnostic Studies  Results Reviewed     Procedure Component Value Units Date/Time    Comprehensive metabolic panel [[id number]] Collected: 07/03/22 1857    Lab Status: In process Specimen: Blood from Arm, Left Updated: 07/03/22 1908    Lipase [[id number]] Collected: 07/03/22 1857    Lab Status: In process Specimen: Blood from Arm, Left Updated: 07/03/22 1908    High Sensitivity Troponin I Random [[id number]] Collected: 07/03/22 1857    Lab Status:  In process Specimen: Blood from Arm, Left Updated: 07/03/22 1908    CBC and differential [[id number]] Collected: 07/03/22 1857    Lab Status: Final result Specimen: Blood from Arm, Left Updated: 07/03/22 1907     WBC 8 54 Thousand/uL      RBC 3 94 Million/uL      Hemoglobin 11 5 g/dL      Hematocrit 36 5 %      MCV 93 fL      MCH 29 2 pg      MCHC 31 5 g/dL      RDW 13 3 %      MPV 9 5 fL      Platelets 964 Thousands/uL      nRBC 0 /100 WBCs      Neutrophils Relative 51 %      Immat GRANS % 0 %      Lymphocytes Relative 39 %      Monocytes Relative 7 %      Eosinophils Relative 2 %      Basophils Relative 1 %      Neutrophils Absolute 4 37 Thousands/µL      Immature Grans Absolute 0 02 Thousand/uL      Lymphocytes Absolute 3 29 Thousands/µL      Monocytes Absolute 0 63 Thousand/µL      Eosinophils Absolute 0 19 Thousand/µL      Basophils Absolute 0 04 Thousands/µL                  No orders to display              Procedures  ECG 12 Lead Documentation Only    Date/Time: 7/3/2022 7:11 PM  Performed by: Janay Moncada Ann Marie Mello MD  Authorized by: Renate Douglass MD     Indications / Diagnosis:  Abdominal pain  ECG reviewed by me, the ED Provider: yes    Patient location:  ED  Previous ECG:     Comparison to cardiac monitor: No    Interpretation:     Interpretation: abnormal    Rate:     ECG rate:  55  Rhythm:     Rhythm: sinus bradycardia    Ectopy:     Ectopy: none    QRS:     QRS axis:  Normal    QRS intervals:  Normal  Conduction:     Conduction: abnormal      Abnormal conduction: 1st degree    ST segments:     ST segments:  Normal  T waves:     T waves: normal               ED Course                               SBIRT 22yo+    Flowsheet Row Most Recent Value   SBIRT (25 yo +)    In order to provide better care to our patients, we are screening all of our patients for alcohol and drug use  Would it be okay to ask you these screening questions? Unable to answer at this time Filed at: 07/03/2022 1845                    MDM    Disposition  Final diagnoses:   None     ED Disposition     None      Follow-up Information    None         Patient's Medications   Discharge Prescriptions    No medications on file       No discharge procedures on file      PDMP Review     None          ED Provider  Electronically Signed by           Renate Douglass MD  07/03/22 5052

## 2022-08-26 ENCOUNTER — APPOINTMENT (OUTPATIENT)
Dept: LAB | Facility: HOSPITAL | Age: 82
End: 2022-08-26
Payer: COMMERCIAL

## 2022-08-26 DIAGNOSIS — R41.3 OTHER AMNESIA: ICD-10-CM

## 2022-08-26 LAB
RPR SER QL: NORMAL
VIT B12 SERPL-MCNC: 1030 PG/ML (ref 100–900)

## 2022-08-26 PROCEDURE — 86592 SYPHILIS TEST NON-TREP QUAL: CPT

## 2022-08-26 PROCEDURE — 36415 COLL VENOUS BLD VENIPUNCTURE: CPT

## 2022-08-26 PROCEDURE — 82607 VITAMIN B-12: CPT

## 2023-01-31 ENCOUNTER — APPOINTMENT (OUTPATIENT)
Dept: LAB | Facility: HOSPITAL | Age: 83
End: 2023-01-31

## 2023-01-31 DIAGNOSIS — E13.69 OTHER SPECIFIED DIABETES MELLITUS WITH OTHER SPECIFIED COMPLICATION, UNSPECIFIED WHETHER LONG TERM INSULIN USE (HCC): Primary | ICD-10-CM

## 2023-01-31 DIAGNOSIS — E03.9 MYXEDEMA HEART DISEASE: ICD-10-CM

## 2023-01-31 DIAGNOSIS — I51.9 MYXEDEMA HEART DISEASE: ICD-10-CM

## 2023-01-31 LAB
ALBUMIN SERPL BCP-MCNC: 4.2 G/DL (ref 3.5–5)
ALP SERPL-CCNC: 74 U/L (ref 43–122)
ALT SERPL W P-5'-P-CCNC: 20 U/L
ANION GAP SERPL CALCULATED.3IONS-SCNC: 7 MMOL/L (ref 5–14)
AST SERPL W P-5'-P-CCNC: 29 U/L (ref 14–36)
BASOPHILS # BLD AUTO: 0.06 THOUSANDS/ÂΜL (ref 0–0.1)
BASOPHILS NFR BLD AUTO: 1 % (ref 0–1)
BILIRUB SERPL-MCNC: 0.45 MG/DL (ref 0.2–1)
BUN SERPL-MCNC: 26 MG/DL (ref 5–25)
CALCIUM SERPL-MCNC: 9.3 MG/DL (ref 8.4–10.2)
CHLORIDE SERPL-SCNC: 104 MMOL/L (ref 96–108)
CO2 SERPL-SCNC: 28 MMOL/L (ref 21–32)
CREAT SERPL-MCNC: 1.31 MG/DL (ref 0.6–1.2)
EOSINOPHIL # BLD AUTO: 0.25 THOUSAND/ÂΜL (ref 0–0.61)
EOSINOPHIL NFR BLD AUTO: 3 % (ref 0–6)
ERYTHROCYTE [DISTWIDTH] IN BLOOD BY AUTOMATED COUNT: 13.2 % (ref 11.6–15.1)
EST. AVERAGE GLUCOSE BLD GHB EST-MCNC: 183 MG/DL
GFR SERPL CREATININE-BSD FRML MDRD: 38 ML/MIN/1.73SQ M
GLUCOSE P FAST SERPL-MCNC: 129 MG/DL (ref 70–99)
HBA1C MFR BLD: 8 %
HCT VFR BLD AUTO: 37.8 % (ref 34.8–46.1)
HGB BLD-MCNC: 12.4 G/DL (ref 11.5–15.4)
IMM GRANULOCYTES # BLD AUTO: 0.02 THOUSAND/UL (ref 0–0.2)
IMM GRANULOCYTES NFR BLD AUTO: 0 % (ref 0–2)
LYMPHOCYTES # BLD AUTO: 4.08 THOUSANDS/ÂΜL (ref 0.6–4.47)
LYMPHOCYTES NFR BLD AUTO: 54 % (ref 14–44)
MCH RBC QN AUTO: 30.6 PG (ref 26.8–34.3)
MCHC RBC AUTO-ENTMCNC: 32.8 G/DL (ref 31.4–37.4)
MCV RBC AUTO: 93 FL (ref 82–98)
MONOCYTES # BLD AUTO: 0.63 THOUSAND/ÂΜL (ref 0.17–1.22)
MONOCYTES NFR BLD AUTO: 8 % (ref 4–12)
NEUTROPHILS # BLD AUTO: 2.61 THOUSANDS/ÂΜL (ref 1.85–7.62)
NEUTS SEG NFR BLD AUTO: 34 % (ref 43–75)
NRBC BLD AUTO-RTO: 0 /100 WBCS
PLATELET # BLD AUTO: 309 THOUSANDS/UL (ref 149–390)
PMV BLD AUTO: 9.7 FL (ref 8.9–12.7)
POTASSIUM SERPL-SCNC: 4.9 MMOL/L (ref 3.5–5.3)
PROT SERPL-MCNC: 7.7 G/DL (ref 6.4–8.4)
RBC # BLD AUTO: 4.05 MILLION/UL (ref 3.81–5.12)
SODIUM SERPL-SCNC: 139 MMOL/L (ref 135–147)
TSH SERPL DL<=0.05 MIU/L-ACNC: 3.96 UIU/ML (ref 0.45–4.5)
WBC # BLD AUTO: 7.65 THOUSAND/UL (ref 4.31–10.16)

## 2023-03-11 ENCOUNTER — APPOINTMENT (EMERGENCY)
Dept: CT IMAGING | Facility: HOSPITAL | Age: 83
End: 2023-03-11

## 2023-03-11 ENCOUNTER — APPOINTMENT (EMERGENCY)
Dept: RADIOLOGY | Facility: HOSPITAL | Age: 83
End: 2023-03-11

## 2023-03-11 ENCOUNTER — HOSPITAL ENCOUNTER (OUTPATIENT)
Facility: HOSPITAL | Age: 83
Setting detail: OBSERVATION
Discharge: HOME | End: 2023-03-12
Attending: EMERGENCY MEDICINE | Admitting: INTERNAL MEDICINE

## 2023-03-11 DIAGNOSIS — R53.1 GENERALIZED WEAKNESS: Primary | ICD-10-CM

## 2023-03-11 DIAGNOSIS — R09.02 HYPOXIA: ICD-10-CM

## 2023-03-11 PROBLEM — J96.01 ACUTE RESPIRATORY FAILURE WITH HYPOXIA (HCC): Status: ACTIVE | Noted: 2023-03-11

## 2023-03-11 LAB
ALBUMIN SERPL BCP-MCNC: 4.5 G/DL (ref 3.5–5)
ALP SERPL-CCNC: 102 U/L (ref 43–122)
ALT SERPL W P-5'-P-CCNC: 18 U/L
AMMONIA PLAS-SCNC: <9 UMOL/L (ref 9–33)
ANION GAP SERPL CALCULATED.3IONS-SCNC: 10 MMOL/L (ref 5–14)
APAP SERPL-MCNC: <10 UG/ML (ref 10–20)
APTT PPP: 27 SECONDS (ref 23–37)
AST SERPL W P-5'-P-CCNC: 25 U/L (ref 14–36)
BACTERIA UR QL AUTO: NORMAL /HPF
BASE EX.OXY STD BLDV CALC-SCNC: 80.2 % (ref 60–80)
BASE EXCESS BLDV CALC-SCNC: 0.6 MMOL/L
BASOPHILS # BLD AUTO: 0.05 THOUSANDS/ÂΜL (ref 0–0.1)
BASOPHILS NFR BLD AUTO: 1 % (ref 0–1)
BETA-HYDROXYBUTYRATE: 0.1 MMOL/L
BILIRUB SERPL-MCNC: 0.61 MG/DL (ref 0.2–1)
BILIRUB UR QL STRIP: NEGATIVE
BUN SERPL-MCNC: 21 MG/DL (ref 5–25)
CALCIUM SERPL-MCNC: 9.7 MG/DL (ref 8.4–10.2)
CARDIAC TROPONIN I PNL SERPL HS: 3 NG/L
CHLORIDE SERPL-SCNC: 102 MMOL/L (ref 96–108)
CLARITY UR: CLEAR
CO2 SERPL-SCNC: 27 MMOL/L (ref 21–32)
COLOR UR: ABNORMAL
CREAT SERPL-MCNC: 1.19 MG/DL (ref 0.6–1.2)
EOSINOPHIL # BLD AUTO: 0.17 THOUSAND/ÂΜL (ref 0–0.61)
EOSINOPHIL NFR BLD AUTO: 2 % (ref 0–6)
ERYTHROCYTE [DISTWIDTH] IN BLOOD BY AUTOMATED COUNT: 12.6 % (ref 11.6–15.1)
ETHANOL SERPL-MCNC: <10 MG/DL (ref 0–10)
FLUAV RNA RESP QL NAA+PROBE: NEGATIVE
FLUBV RNA RESP QL NAA+PROBE: NEGATIVE
GFR SERPL CREATININE-BSD FRML MDRD: 42 ML/MIN/1.73SQ M
GLUCOSE SERPL-MCNC: 117 MG/DL (ref 65–140)
GLUCOSE SERPL-MCNC: 134 MG/DL (ref 65–140)
GLUCOSE SERPL-MCNC: 138 MG/DL (ref 70–99)
GLUCOSE UR STRIP-MCNC: ABNORMAL MG/DL
HCO3 BLDV-SCNC: 24.3 MMOL/L (ref 24–30)
HCT VFR BLD AUTO: 38.7 % (ref 34.8–46.1)
HGB BLD-MCNC: 12.7 G/DL (ref 11.5–15.4)
HGB UR QL STRIP.AUTO: NEGATIVE
IMM GRANULOCYTES # BLD AUTO: 0.02 THOUSAND/UL (ref 0–0.2)
IMM GRANULOCYTES NFR BLD AUTO: 0 % (ref 0–2)
INR PPP: 0.88 (ref 0.84–1.19)
KETONES UR STRIP-MCNC: NEGATIVE MG/DL
LACTATE SERPL-SCNC: 0.9 MMOL/L (ref 0.5–2)
LACTATE SERPL-SCNC: 2.3 MMOL/L (ref 0.5–2)
LEUKOCYTE ESTERASE UR QL STRIP: 25
LIPASE SERPL-CCNC: 245 U/L (ref 23–300)
LYMPHOCYTES # BLD AUTO: 4.27 THOUSANDS/ÂΜL (ref 0.6–4.47)
LYMPHOCYTES NFR BLD AUTO: 44 % (ref 14–44)
MAGNESIUM SERPL-MCNC: 2 MG/DL (ref 1.6–2.3)
MCH RBC QN AUTO: 30.4 PG (ref 26.8–34.3)
MCHC RBC AUTO-ENTMCNC: 32.8 G/DL (ref 31.4–37.4)
MCV RBC AUTO: 93 FL (ref 82–98)
MONOCYTES # BLD AUTO: 0.7 THOUSAND/ÂΜL (ref 0.17–1.22)
MONOCYTES NFR BLD AUTO: 7 % (ref 4–12)
NEUTROPHILS # BLD AUTO: 4.58 THOUSANDS/ÂΜL (ref 1.85–7.62)
NEUTS SEG NFR BLD AUTO: 46 % (ref 43–75)
NITRITE UR QL STRIP: NEGATIVE
NON-SQ EPI CELLS URNS QL MICRO: NORMAL /HPF
NRBC BLD AUTO-RTO: 0 /100 WBCS
O2 CT BLDV-SCNC: 15.7 ML/DL
PCO2 BLDV: 36.1 MM HG (ref 42–50)
PH BLDV: 7.45 [PH] (ref 7.3–7.4)
PH UR STRIP.AUTO: 8 [PH]
PHOSPHATE SERPL-MCNC: 2.9 MG/DL (ref 2.5–4.8)
PLATELET # BLD AUTO: 291 THOUSANDS/UL (ref 149–390)
PMV BLD AUTO: 9.5 FL (ref 8.9–12.7)
PO2 BLDV: 41.5 MM HG (ref 35–45)
POTASSIUM SERPL-SCNC: 4 MMOL/L (ref 3.5–5.3)
PROT SERPL-MCNC: 8.2 G/DL (ref 6.4–8.4)
PROT UR STRIP-MCNC: NEGATIVE MG/DL
PROTHROMBIN TIME: 12.2 SECONDS (ref 11.6–14.5)
RBC # BLD AUTO: 4.18 MILLION/UL (ref 3.81–5.12)
RBC #/AREA URNS AUTO: NORMAL /HPF
RSV RNA RESP QL NAA+PROBE: NEGATIVE
SALICYLATES SERPL-MCNC: <1 MG/DL (ref 10–30)
SARS-COV-2 RNA RESP QL NAA+PROBE: NEGATIVE
SODIUM SERPL-SCNC: 139 MMOL/L (ref 135–147)
SP GR UR STRIP.AUTO: 1.01 (ref 1–1.04)
TSH SERPL DL<=0.05 MIU/L-ACNC: 2.92 UIU/ML (ref 0.45–4.5)
UROBILINOGEN UA: NEGATIVE MG/DL
WBC # BLD AUTO: 9.79 THOUSAND/UL (ref 4.31–10.16)
WBC #/AREA URNS AUTO: NORMAL /HPF

## 2023-03-11 RX ORDER — BLOOD SUGAR DIAGNOSTIC
STRIP MISCELLANEOUS
COMMUNITY
Start: 2023-01-10

## 2023-03-11 RX ORDER — OMEPRAZOLE 20 MG/1
20 CAPSULE, DELAYED RELEASE ORAL DAILY
COMMUNITY
Start: 2023-01-24

## 2023-03-11 RX ORDER — INSULIN LISPRO 100 [IU]/ML
1-5 INJECTION, SOLUTION INTRAVENOUS; SUBCUTANEOUS
Status: DISCONTINUED | OUTPATIENT
Start: 2023-03-11 | End: 2023-03-12 | Stop reason: HOSPADM

## 2023-03-11 RX ORDER — LOSARTAN POTASSIUM 50 MG/1
50 TABLET ORAL DAILY
Status: DISCONTINUED | OUTPATIENT
Start: 2023-03-12 | End: 2023-03-11

## 2023-03-11 RX ORDER — AMLODIPINE BESYLATE 5 MG/1
5 TABLET ORAL DAILY
Status: DISCONTINUED | OUTPATIENT
Start: 2023-03-11 | End: 2023-03-12 | Stop reason: HOSPADM

## 2023-03-11 RX ORDER — LANCETS 30 GAUGE
EACH MISCELLANEOUS
COMMUNITY
Start: 2023-01-13

## 2023-03-11 RX ORDER — ACETAMINOPHEN 325 MG/1
650 TABLET ORAL EVERY 4 HOURS PRN
Status: DISCONTINUED | OUTPATIENT
Start: 2023-03-11 | End: 2023-03-12 | Stop reason: HOSPADM

## 2023-03-11 RX ORDER — LEVOTHYROXINE SODIUM 0.03 MG/1
25 TABLET ORAL
Status: DISCONTINUED | OUTPATIENT
Start: 2023-03-12 | End: 2023-03-12 | Stop reason: HOSPADM

## 2023-03-11 RX ORDER — FAMOTIDINE 20 MG/1
20 TABLET, FILM COATED ORAL DAILY
Status: DISCONTINUED | OUTPATIENT
Start: 2023-03-12 | End: 2023-03-12 | Stop reason: HOSPADM

## 2023-03-11 RX ORDER — DOCUSATE SODIUM 100 MG/1
100 CAPSULE, LIQUID FILLED ORAL EVERY 12 HOURS
Status: DISCONTINUED | OUTPATIENT
Start: 2023-03-11 | End: 2023-03-12 | Stop reason: HOSPADM

## 2023-03-11 RX ORDER — MELATONIN
COMMUNITY
Start: 2022-12-20

## 2023-03-11 RX ORDER — LEVOTHYROXINE SODIUM 0.03 MG/1
TABLET ORAL
COMMUNITY
Start: 2022-12-19

## 2023-03-11 RX ORDER — PANTOPRAZOLE SODIUM 40 MG/1
40 TABLET, DELAYED RELEASE ORAL
Status: DISCONTINUED | OUTPATIENT
Start: 2023-03-12 | End: 2023-03-12 | Stop reason: HOSPADM

## 2023-03-11 RX ORDER — FAMOTIDINE 40 MG/1
TABLET, FILM COATED ORAL
COMMUNITY
Start: 2023-01-07

## 2023-03-11 RX ORDER — AMLODIPINE BESYLATE 5 MG/1
TABLET ORAL
COMMUNITY
Start: 2023-01-13

## 2023-03-11 RX ORDER — AMLODIPINE BESYLATE 5 MG/1
5 TABLET ORAL DAILY
Status: DISCONTINUED | OUTPATIENT
Start: 2023-03-12 | End: 2023-03-11

## 2023-03-11 RX ORDER — ENOXAPARIN SODIUM 100 MG/ML
30 INJECTION SUBCUTANEOUS DAILY
Status: DISCONTINUED | OUTPATIENT
Start: 2023-03-12 | End: 2023-03-12 | Stop reason: HOSPADM

## 2023-03-11 RX ORDER — LOSARTAN POTASSIUM 50 MG/1
50 TABLET ORAL DAILY
Status: DISCONTINUED | OUTPATIENT
Start: 2023-03-11 | End: 2023-03-12 | Stop reason: HOSPADM

## 2023-03-11 RX ORDER — ONDANSETRON 2 MG/ML
4 INJECTION INTRAMUSCULAR; INTRAVENOUS EVERY 6 HOURS PRN
Status: DISCONTINUED | OUTPATIENT
Start: 2023-03-11 | End: 2023-03-12

## 2023-03-11 RX ORDER — ISOPROPYL ALCOHOL 70 ML/100ML
SWAB TOPICAL
COMMUNITY
Start: 2023-01-20

## 2023-03-11 RX ORDER — ASPIRIN 81 MG/1
81 TABLET, CHEWABLE ORAL DAILY
Status: DISCONTINUED | OUTPATIENT
Start: 2023-03-12 | End: 2023-03-12 | Stop reason: HOSPADM

## 2023-03-11 RX ORDER — LOSARTAN POTASSIUM 50 MG/1
TABLET ORAL
COMMUNITY
Start: 2022-12-19

## 2023-03-11 RX ORDER — ATORVASTATIN CALCIUM 80 MG/1
80 TABLET, FILM COATED ORAL
Status: DISCONTINUED | OUTPATIENT
Start: 2023-03-11 | End: 2023-03-12 | Stop reason: HOSPADM

## 2023-03-11 RX ORDER — HYDROCHLOROTHIAZIDE 25 MG/1
25 TABLET ORAL DAILY
Status: DISCONTINUED | OUTPATIENT
Start: 2023-03-12 | End: 2023-03-12 | Stop reason: HOSPADM

## 2023-03-11 RX ORDER — OMEGA-3-ACID ETHYL ESTERS 1 G/1
CAPSULE, LIQUID FILLED ORAL
COMMUNITY
Start: 2023-01-12

## 2023-03-11 RX ADMIN — IOHEXOL 100 ML: 350 INJECTION, SOLUTION INTRAVENOUS at 16:11

## 2023-03-11 RX ADMIN — SODIUM CHLORIDE 1000 ML: 0.9 INJECTION, SOLUTION INTRAVENOUS at 14:49

## 2023-03-11 RX ADMIN — SERTRALINE HYDROCHLORIDE 50 MG: 50 TABLET ORAL at 21:03

## 2023-03-11 RX ADMIN — AMLODIPINE BESYLATE 5 MG: 5 TABLET ORAL at 21:03

## 2023-03-11 RX ADMIN — LOSARTAN POTASSIUM 50 MG: 50 TABLET, FILM COATED ORAL at 21:03

## 2023-03-11 NOTE — ASSESSMENT & PLAN NOTE
· Unclear etiology  · Patient requiring 2 L of nasal cannula supplemental O2  · Chest x-ray and CT chest within normal limits  · COVID/RSV/Influenza panel pending  · Respiratory protocol  · Wean O2 as tolerated  · Goal SPO2 greater than 90%

## 2023-03-11 NOTE — ASSESSMENT & PLAN NOTE
· Unclear etiology  · COVID panel pending  · Check TSH  · PT/OT evaluation and treatment  · Case Management consultation for safe disposition planning

## 2023-03-11 NOTE — ASSESSMENT & PLAN NOTE
Lab Results   Component Value Date    EGFR 42 03/11/2023    EGFR 38 01/31/2023    EGFR 36 07/03/2022    CREATININE 1 19 03/11/2023    CREATININE 1 31 (H) 01/31/2023    CREATININE 1 37 (H) 07/03/2022   · Creatinine better than baseline  · Trend BMP

## 2023-03-11 NOTE — ED PROVIDER NOTES
History  Chief Complaint   Patient presents with   • Weakness - Generalized     With numbness on feet and hands,  Diaphoretic and disoriented to time  83 y/o F, hx of diabetes, HTN, HLD, renal disorder, heart abnormalities, presenting for evaluation with her daughter who reports she recently started taking empagliflozin and had symptoms which started immediately after taking it today, patient denies chest pain, abdominal pain, fevers, chills or recent infection  Patient reports no headache, dizziness, daughter confirms no recent trauma  Patient reports she feels fatigued and generally weak however denies vision changes, unilateral sensory changes  Prior to Admission Medications   Prescriptions Last Dose Informant Patient Reported? Taking? Aspirin 81 MG CAPS   Yes No   Sig: Take 81 mg by mouth daily   Diclofenac Sodium (VOLTAREN) 1 %   No No   Sig: Apply to left shoulder region 4 times aday  Docusate Sodium (DSS) 100 MG CAPS   Yes No   Sig: Take 100 mg by mouth   GNP Alcohol Swabs 70 % PADS   Yes No   Sig: TEST 2-3 TIMES A DAY AS DIRECTED TEST 2-3 TIMES A DAY AS DIRECTED   Global Inject Ease Lancets 30G MISC   Yes No   Sig: TEST 2-3 TIMES A DAY AS DIRECTED TEST 2-3 TIMES A DAY AS DIRECTED   OneTouch Ultra test strip   Yes No   Sig: USE AS DIRECTED  USE AS DIRECTED    amLODIPine (NORVASC) 5 mg tablet   Yes No   Sig: TAKE 1 TABLET EVERY DAY BY ORAL ROUTE   atorvastatin (LIPITOR) 80 mg tablet   Yes No   Sig: Take 80 mg by mouth   cholecalciferol (VITAMIN D3) 1,000 units tablet   Yes No   Sig: TAKE 1 TABLET(S) EVERY DAY BY ORAL ROUTE FOR 90 DAYS     ciclopirox (PENLAC) 8 % solution   Yes No   Sig: APPLY TO THE AFFECTED AREA(S) BY TOPICAL ROUTE ONCE DAILY PREFERABLY AT BEDTIME OR 8 HOURS BEFORE WASHING   docusate sodium (COLACE) 100 mg capsule   No No   Sig: Take 1 capsule (100 mg total) by mouth every 12 (twelve) hours   famotidine (PEPCID) 40 MG tablet   Yes No   Sig: TAKE ONE TABLET BY MOUTH DAILY HE 1 TABLETA POR VIA ORAL DIARIAMENTE   hydrochlorothiazide (HYDRODIURIL) 25 mg tablet   Yes No   Sig: Take 25 mg by mouth   levothyroxine 25 mcg tablet   Yes No   Sig: TAKE ONE TABLET BY MOUTH DAILY HE 1 TABLETA POR VIA ORAL DIARIAMENTE   losartan (COZAAR) 50 mg tablet   Yes No   Sig: TAKE 1 TABLET EVERY DAY BY ORAL ROUTE FOR 90 DAYS  metFORMIN (GLUCOPHAGE) 500 mg tablet   Yes No   Sig: Take 1 tablet by mouth every 12 (twelve) hours   omega-3-acid ethyl esters (LOVAZA) 1 g capsule   Yes No   Sig: TAKE 2 CAPSULE(S) TWICE A DAY BY ORAL ROUTE FOR 90 DAYS  omeprazole (PriLOSEC) 20 mg delayed release capsule   Yes No   Sig: Take 20 mg by mouth daily   sertraline (ZOLOFT) 50 mg tablet   Yes No   Sig: TAKE 1 TABLET EVERY DAY BY ORAL ROUTE  Facility-Administered Medications: None       Past Medical History:   Diagnosis Date   • Diabetes mellitus ([facility] [address] )    • Heart abnormality    • Hyperlipidemia    • Hypertension    • Renal disorder        Past Surgical History:   Procedure Laterality Date   •  SECTION         History reviewed  No pertinent family history  I have reviewed and agree with the history as documented  E-Cigarette/Vaping   • E-Cigarette Use Never User      E-Cigarette/Vaping Substances   • Nicotine No    • THC No    • CBD No    • Flavoring No    • Other No    • Unknown No      Social History     Tobacco Use   • Smoking status: Never   • Smokeless tobacco: Never   Vaping Use   • Vaping Use: Never used   Substance Use Topics   • Alcohol use: Never   • Drug use: Never       Review of Systems   Constitutional: Positive for fatigue  Negative for chills and fever  HENT: Negative for congestion, ear pain, rhinorrhea and sore throat  Eyes: Negative for redness  Respiratory: Negative for chest tightness and shortness of breath  Cardiovascular: Negative for chest pain and palpitations  Gastrointestinal: Negative for abdominal pain, nausea and vomiting     Genitourinary: Negative for dysuria and hematuria  Musculoskeletal: Negative  Skin: Negative for rash  Neurological: Positive for weakness ( generalized fatigue)  Negative for dizziness, syncope, light-headedness and numbness  Psychiatric/Behavioral: Positive for confusion ( as evidenced by inability to answer month / year correctly)  Physical Exam  Physical Exam  Vitals and nursing note reviewed  Constitutional:       Appearance: She is well-developed  HENT:      Head: Normocephalic  Eyes:      General: No scleral icterus  Cardiovascular:      Rate and Rhythm: Normal rate and regular rhythm  Pulmonary:      Effort: Pulmonary effort is normal       Breath sounds: Normal breath sounds  No stridor  Abdominal:      General: There is no distension  Palpations: Abdomen is soft  Tenderness: There is no abdominal tenderness  Musculoskeletal:         General: Normal range of motion  Skin:     General: Skin is warm and dry  Capillary Refill: Capillary refill takes less than 2 seconds  Neurological:      Mental Status: She is alert  Comments: Pt is oriented to person and place but not year  No slurred speech  Patient - CONTRARY TO TRIAGE - Has normal sensation and reports no numbness to b/l feet and hands            Vital Signs  ED Triage Vitals [03/11/23 1439]   Temperature Pulse Respirations Blood Pressure SpO2   97 5 °F (36 4 °C) (!) 54 (!) 24 (!) 180/79 100 %      Temp Source Heart Rate Source Patient Position - Orthostatic VS BP Location FiO2 (%)   Oral Monitor Lying Left arm --      Pain Score       --           Vitals:    03/11/23 1439 03/11/23 1528 03/11/23 1530   BP: (!) 180/79  (!) 177/98   Pulse: (!) 54 (!) 49 (!) 52   Patient Position - Orthostatic VS: Lying Lying Lying         Visual Acuity  Visual Acuity    Flowsheet Row Most Recent Value   L Pupil Size (mm) 2          ED Medications  Medications   sodium chloride 0 9 % bolus 1,000 mL (0 mL Intravenous Stopped 3/11/23 1610)   iohexol (OMNIPAQUE) 350 MG/ML injection (SINGLE-DOSE) 100 mL (100 mL Intravenous Given 3/11/23 1611)       Diagnostic Studies  Results Reviewed     Procedure Component Value Units Date/Time    FLU/RSV/COVID - if FLU/RSV clinically relevant [[id number]]     Lab Status: No result Specimen: Nares from Nose     Lipase [[id number]] Collected: 03/11/23 1446    Lab Status: In process Specimen: Blood from Arm, Right Updated: 03/11/23 1717    UA (URINE) with reflex to Scope [[id number]]  (Abnormal) Collected: 03/11/23 1624    Lab Status: Final result Specimen: Urine, Other Updated: 03/11/23 1714     Color, UA Straw     Clarity, UA Clear     Specific Gravity, UA 1 015     pH, UA 8 0     Leukocytes, UA 25 0     Nitrite, UA Negative     Protein, UA Negative mg/dl      Glucose, UA >=1000 (1%) mg/dl      Ketones, UA Negative mg/dl      Bilirubin, UA Negative     Occult Blood, UA Negative     UROBILINOGEN UA Negative mg/dL     Urine Microscopic [[id number]] Collected: 03/11/23 1624    Lab Status:  In process Specimen: Urine, Other Updated: 03/11/23 1658    Beta Hydroxybutyrate [[id number]]  (Normal) Collected: 03/11/23 1446    Lab Status: Final result Specimen: Blood from Arm, Right Updated: 03/11/23 1557     BETA-HYDROXYBUTYRATE 0 1 mmol/L     Blood gas, venous [[id number]]  (Abnormal) Collected: 03/11/23 1446    Lab Status: Final result Specimen: Blood from Arm, Right Updated: 03/11/23 1552     pH, Barry 7 446     pCO2, Barry 36 1 mm Hg      pO2, Barry 41 5 mm Hg      HCO3, Barry 24 3 mmol/L      Base Excess, Barry 0 6 mmol/L      O2 Content, Barry 15 7 ml/dL      O2 HGB, VENOUS 80 2 %     HS Troponin 0hr (reflex protocol) [[id number]]  (Normal) Collected: 03/11/23 1447    Lab Status: Final result Specimen: Blood from Arm, Right Updated: 03/11/23 1539     hs TnI 0hr 3 ng/L     Lactic acid, plasma [[id number]]  (Abnormal) Collected: 03/11/23 1449    Lab Status: Final result Specimen: Blood from Arm, Right Updated: 03/11/23 1535     LACTIC ACID 2 3 mmol/L Narrative:      Result may be elevated if tourniquet was used during collection  Lactic acid 2 Hours [[id number]]     Lab Status: No result Specimen: Blood     Acetaminophen level-If concentration is detectable, please discuss with medical  on call   [[id number]]  (Abnormal) Collected: 03/11/23 1446    Lab Status: Final result Specimen: Blood from Arm, Right Updated: 03/11/23 1528     Acetaminophen Level <10 ug/mL     Magnesium [[id number]]  (Normal) Collected: 03/11/23 1446    Lab Status: Final result Specimen: Blood from Arm, Right Updated: 03/11/23 1528     Magnesium 2 0 mg/dL     Phosphorus [[id number]]  (Normal) Collected: 03/11/23 1446    Lab Status: Final result Specimen: Blood from Arm, Right Updated: 03/11/23 1528     Phosphorus 2 9 mg/dL     Salicylate level [[id number]]  (Abnormal) Collected: 03/11/23 1446    Lab Status: Final result Specimen: Blood from Arm, Right Updated: 80/40/77 5995     Salicylate Lvl <4 8 mg/dL     Protime-INR [[id number]]  (Normal) Collected: 03/11/23 1446    Lab Status: Final result Specimen: Blood from Arm, Right Updated: 03/11/23 1528     Protime 12 2 seconds      INR 0 88    APTT [[id number]]  (Normal) Collected: 03/11/23 1446    Lab Status: Final result Specimen: Blood from Arm, Right Updated: 03/11/23 1528     PTT 27 seconds     Comprehensive metabolic panel [[id number]]  (Abnormal) Collected: 03/11/23 1446    Lab Status: Final result Specimen: Blood from Arm, Right Updated: 03/11/23 1528     Sodium 139 mmol/L      Potassium 4 0 mmol/L      Chloride 102 mmol/L      CO2 27 mmol/L      ANION GAP 10 mmol/L      BUN 21 mg/dL      Creatinine 1 19 mg/dL      Glucose 138 mg/dL      Calcium 9 7 mg/dL      AST 25 U/L      ALT 18 U/L      Alkaline Phosphatase 102 U/L      Total Protein 8 2 g/dL      Albumin 4 5 g/dL      Total Bilirubin 0 61 mg/dL      eGFR 42 ml/min/1 73sq m     Narrative:      [place] guidelines for Chronic Kidney Disease (CKD):   • Stage 1 with normal or high GFR (GFR > 90 mL/min/1 73 square meters)  •  Stage 2 Mild CKD (GFR = 60-89 mL/min/1 73 square meters)  •  Stage 3A Moderate CKD (GFR = 45-59 mL/min/1 73 square meters)  •  Stage 3B Moderate CKD (GFR = 30-44 mL/min/1 73 square meters)  •  Stage 4 Severe CKD (GFR = 15-29 mL/min/1 73 square meters)  •  Stage 5 End Stage CKD (GFR <15 mL/min/1 73 square meters)  Note: GFR calculation is accurate only with a steady state creatinine    Ammonia [[id number]]  (Abnormal) Collected: 03/11/23 1446    Lab Status: Final result Specimen: Blood from Arm, Right Updated: 03/11/23 1527     Ammonia <9 umol/L     Ethanol [[id number]]  (Normal) Collected: 03/11/23 1446    Lab Status: Final result Specimen: Blood from Arm, Right Updated: 03/11/23 1526     Ethanol Lvl <10 mg/dL     CBC and differential [[id number]] Collected: 03/11/23 1446    Lab Status: Final result Specimen: Blood from Arm, Right Updated: 03/11/23 1516     WBC 9 79 Thousand/uL      RBC 4 18 Million/uL      Hemoglobin 12 7 g/dL      Hematocrit 38 7 %      MCV 93 fL      MCH 30 4 pg      MCHC 32 8 g/dL      RDW 12 6 %      MPV 9 5 fL      Platelets 371 Thousands/uL      nRBC 0 /100 WBCs      Neutrophils Relative 46 %      Immat GRANS % 0 %      Lymphocytes Relative 44 %      Monocytes Relative 7 %      Eosinophils Relative 2 %      Basophils Relative 1 %      Neutrophils Absolute 4 58 Thousands/µL      Immature Grans Absolute 0 02 Thousand/uL      Lymphocytes Absolute 4 27 Thousands/µL      Monocytes Absolute 0 70 Thousand/µL      Eosinophils Absolute 0 17 Thousand/µL      Basophils Absolute 0 05 Thousands/µL     Fingerstick Glucose (POCT) [[id number]]  (Normal) Collected: 03/11/23 1429    Lab Status: Final result Updated: 03/11/23 1438     POC Glucose 134 mg/dl                  CT pe study w abdomen pelvis w contrast   Final Result by Goyo Guillory MD (03/11 1641)      1    No pulmonary embolism or additional acute abnormality in the chest  2   No acute abnormality in the abdomen or pelvis  Workstation performed: [id number]         XR chest portable   ED Interpretation by Denys Rubi PA-C (03/11 1533)   Unchanged from prior, no acute changes  CT head without contrast   Final Result by Lisandra Olivo MD (03/11 1543)      No acute intracranial abnormality  Workstation performed: [id number]                    Procedures  ECG 12 Lead Documentation Only    Date/Time: 3/11/2023 3:14 PM  Performed by: Denys Rubi PA-C  Authorized by: Denys Rubi PA-C     Indications / Diagnosis:  Generalized weakness  ECG reviewed by me, the ED Provider: yes    Patient location:  ED  Previous ECG:     Previous ECG:  Compared to current    Comparison ECG info:  7/2022    Similarity:  No change    Comparison to cardiac monitor: Yes    Interpretation:     Interpretation: normal    Rate:     ECG rate:  55    ECG rate assessment: bradycardic    Rhythm:     Rhythm: sinus bradycardia    Ectopy:     Ectopy: none    QRS:     QRS axis:  Normal    QRS intervals:  Normal  Conduction:     Conduction: abnormal      Abnormal conduction: 1st degree    ST segments:     ST segments:  Normal  T waves:     T waves: inverted    Comments:        QRS 90                   ED Course  ED Course as of 03/11/23 1720   Sat Mar 11, 2023   1546 Nursing staff report the patient had a desaturation down to 88% on room air, nasal cannula applied with 2lpm NC  Up to 100% now, will send pt for PE study due to patient reporting she feels short of breath  1654    1  No pulmonary embolism or additional acute abnormality in the chest      2    No acute abnormality in the abdomen or pelvis                      Stroke Assessment     Row Name 03/11/23 1517             NIH Stroke Scale    Interval Baseline      Level of Consciousness (1a ) 0      LOC Questions (1b ) 1      LOC Commands (1c ) 0      Best Gaze (2 ) 0      Visual (3 ) 0      Facial Palsy (4 ) 0      Motor Arm, Left (5a ) 0      Motor Arm, Right (5b ) 0      Motor Leg, Left (6a ) 0      Motor Leg, Right (6b ) 0      Limb Ataxia (7 ) 0      Sensory (8 ) 0      Best Language (9 ) 0      Dysarthria (10 ) 0      Extinction and Inattention (11 ) (Formerly Neglect) 0      Total 1              Flowsheet Row Most Recent Value   Thrombolytic Decision Options    Thrombolytic Decision Patient not a candidate  Patient is not a candidate options Symptoms resolved/clearly non disabling  Medical Decision Making  83 y/o female presenting with generalized fatigue and weakness, differential diagnosis is broad initially hypoxia, hypoglycemia ruled out, doubt drug use given the lack of opioids in medication regimen, other DDX including MI, acute infection, anemia, encephalitis, stroke, howeverlower suspicion for the above given a reassuring exam, normal EKG, and lack of infectious s/s, given new empagliflozin use concern for euglycemic DKA is present, additionally given potential weakness, concern for kidney failure or metabolic derangements  Workup reveals: EKG with T wave inversions on V1 and V2, otherwise EKG on my interpretation WNL, BGL is normal with normal beta hydroxybutyrate and VBG and CMP not consistent with DKA  Labs are without significant abnormalities  Patient had a desaturation episode to 88% on room air, 2lpm NC applied with improvement  Patient reports she continues to feel fatigued  Due to desaturation CTPE obtained noted to be normal  COVID added at recommendation of SLIM provider, patient admitted for observation due to hypoxia and new onset generalized fatigue without localizing features  All imaging and/or lab testing discussed with patient  Patient and/or family members verbalizes understanding and agrees with plan for admission   Patient is stable for admission      Portions of the record may have been created with voice recognition software  Occasional wrong word or "sound a like" substitutions may have occurred due to the inherent limitations of voice recognition software  Read the chart carefully and recognize, using context, where substitutions have occurred  Amount and/or Complexity of Data Reviewed  Independent Historian: caregiver  External Data Reviewed: labs and ECG  Labs: ordered  Decision-making details documented in ED Course  Radiology: ordered and independent interpretation performed  Decision-making details documented in ED Course  ECG/medicine tests: ordered and independent interpretation performed  Decision-making details documented in ED Course  Risk  Prescription drug management  Decision regarding hospitalization  Disposition  Final diagnoses:   Generalized weakness   Hypoxia     Time reflects when diagnosis was documented in both MDM as applicable and the Disposition within this note     Time User Action Codes Description Comment    3/11/2023  5:19 PM Darleen Myrick Add [R53 1] Generalized weakness     3/11/2023  5:20 PM Darleen Myrick Add [R09 02] Hypoxia       ED Disposition     ED Disposition   Admit    Condition   Stable    Date/Time   Sat Mar 11, 2023  5:19 PM    Comment   Case was discussed with Dr Barragan and the patient's admission status was agreed to be Admission Status: observation status to the service of Dr Kay Runner   Follow-up Information    None         Patient's Medications   Discharge Prescriptions    No medications on file       No discharge procedures on file      PDMP Review     None          ED Provider  Electronically Signed by           Twila Clarke PA-C  03/11/23 6864

## 2023-03-11 NOTE — ASSESSMENT & PLAN NOTE
Lab Results   Component Value Date    HGBA1C 8 0 (H) 01/31/2023       Recent Labs     03/11/23  1429   POCGLU 134       Blood Sugar Average: Last 72 hrs:  (P) 134   · Hold home oral hypoglycemic agents  · Sliding scale insulin while inpatient

## 2023-03-11 NOTE — ASSESSMENT & PLAN NOTE
· Hypertensive on presentation  · Continue home amlodipine, hydrochlorothiazide, losartan  · Monitor blood pressures

## 2023-03-11 NOTE — H&P
[facility]  H&P- Memory Beams 1940, 82 y o  female MRN: [id number]  Unit/Bed#: ED 03 Encounter: [id number]  Primary Care Provider: Clementine Galvan MD   Date and time admitted to hospital: 3/11/2023  2:23 PM    * Generalized weakness  Assessment & Plan  · Unclear etiology  · COVID panel pending  · Check TSH  · PT/OT evaluation and treatment  · Case Management consultation for safe disposition planning    Acute respiratory failure with hypoxia [facility])  Assessment & Plan  · Unclear etiology  · Patient requiring 2 L of nasal cannula supplemental O2  · Chest x-ray and CT chest within normal limits  · COVID/RSV/Influenza panel pending  · Respiratory protocol  · Wean O2 as tolerated  · Goal SPO2 greater than 90%    Dyslipidemia  Assessment & Plan  · Continue home aspirin therapy    Hypertension  Assessment & Plan  · Hypertensive on presentation  · Continue home amlodipine, hydrochlorothiazide, losartan  · Monitor blood pressures    Hypothyroidism  Assessment & Plan  · Check TSH in the setting of generalized weakness  · Continue home levothyroxine 25 mcg daily    Type 2 diabetes mellitus with diabetic chronic kidney disease ([facility]  Assessment & Plan  Lab Results   Component Value Date    HGBA1C 8 0 (H) 01/31/2023       Recent Labs     03/11/23  1429   POCGLU 134       Blood Sugar Average: Last 72 hrs:  (P) 134   · Hold home oral hypoglycemic agents  · Sliding scale insulin while inpatient    Stage 3 chronic kidney disease [facility])  Assessment & Plan  Lab Results   Component Value Date    EGFR 42 03/11/2023    EGFR 38 01/31/2023    EGFR 36 07/03/2022    CREATININE 1 19 03/11/2023    CREATININE 1 31 (H) 01/31/2023    CREATININE 1 37 (H) 07/03/2022   · Creatinine better than baseline  · Trend BMP    VTE Prophylaxis: Lovenox  Code Status: Level 1 full code    Anticipated Length of Stay:  Patient will be admitted on an Observation basis with an anticipated length of stay of  2 midnights  Justification for Hospital Stay: Generalized weakness    Total Time for Visit, including Counseling / Coordination of Care: 60 minutes  Greater than 50% of this total time spent on direct patient counseling and coordination of care  Chief Complaint: Generalized weakness    History of Present Illness:    Judy Martinez is a 82 y o  female with a past medical history significant for hypertension, hyperlipidemia, type 2 diabetes mellitus, hypothyroidism who presents with complaints of generalized weakness  Laboratory analysis and chest imaging in the ED unremarkable  The patient was found to be slightly hypoxic in the ED requiring 2 L of nasal cannula supplemental O2  She was assigned observation in the setting of generalized weakness and acute hypoxic respiratory failure  Review of Systems:    Review of Systems   Constitutional: Negative for chills and fever  HENT: Negative for ear pain and sore throat  Eyes: Negative for pain and visual disturbance  Respiratory: Negative for cough and shortness of breath  Cardiovascular: Negative for chest pain and palpitations  Gastrointestinal: Negative for abdominal pain and vomiting  Genitourinary: Negative for dysuria and hematuria  Musculoskeletal: Negative for arthralgias and back pain  Skin: Negative for color change and rash  Neurological: Positive for weakness  Negative for seizures and syncope  All other systems reviewed and are negative  Past Medical and Surgical History:     Past Medical History:   Diagnosis Date   • Diabetes mellitus ([facility] )    • Heart abnormality    • Hyperlipidemia    • Hypertension    • Renal disorder        Past Surgical History:   Procedure Laterality Date   •  SECTION         Meds/Allergies:    Prior to Admission medications    Medication Sig Start Date End Date Taking?  Authorizing Provider   amLODIPine (NORVASC) 5 mg tablet TAKE 1 TABLET EVERY DAY BY ORAL ROUTE 23   Historical Provider, MD   Aspirin 81 MG CAPS Take 81 mg by mouth daily    Historical Provider, MD   atorvastatin (LIPITOR) 80 mg tablet Take 80 mg by mouth    Historical Provider, MD   cholecalciferol (VITAMIN D3) 1,000 units tablet TAKE 1 TABLET(S) EVERY DAY BY ORAL ROUTE FOR 90 DAYS  12/20/22   Historical Provider, MD   ciclopirox (PENLAC) 8 % solution APPLY TO THE AFFECTED AREA(S) BY TOPICAL ROUTE ONCE DAILY PREFERABLY AT BEDTIME OR 8 HOURS BEFORE WASHING 1/13/23   Historical Provider, MD   Diclofenac Sodium (VOLTAREN) 1 % Apply to left shoulder region 4 times aday  6/4/21   Evangelina Askew MD   docusate sodium (COLACE) 100 mg capsule Take 1 capsule (100 mg total) by mouth every 12 (twelve) hours 7/3/22   Rylee Heck MD   Docusate Sodium (DSS) 100 MG CAPS Take 100 mg by mouth    Historical Provider, MD   famotidine (PEPCID) 40 MG tablet TAKE ONE TABLET BY MOUTH DAILY HE 1 TABLETA POR VIA ORAL Montelorena Fellluann 1/7/23   Historical Provider, MD   Global Inject Ease Lancets 30G MISC TEST 2-3 TIMES A DAY AS DIRECTED TEST 2-3 TIMES A DAY AS DIRECTED 1/13/23   Historical Provider, MD   GNP Alcohol Swabs 70 % PADS TEST 2-3 TIMES A DAY AS DIRECTED TEST 2-3 TIMES A DAY AS DIRECTED 1/20/23   Historical Provider, MD   hydrochlorothiazide (HYDRODIURIL) 25 mg tablet Take 25 mg by mouth    Historical Provider, MD   levothyroxine 25 mcg tablet TAKE ONE TABLET BY MOUTH DAILY HE 1 TABLETA POR VIA ORAL Moiy Fellluann 12/19/22   Historical Provider, MD   losartan (COZAAR) 50 mg tablet TAKE 1 TABLET EVERY DAY BY ORAL ROUTE FOR 90 DAYS   12/19/22   Historical Provider, MD   metFORMIN (GLUCOPHAGE) 500 mg tablet Take 1 tablet by mouth every 12 (twelve) hours 2/23/22   Historical Provider, MD   omega-3-acid ethyl esters (LOVAZA) 1 g capsule TAKE 2 CAPSULE(S) TWICE A DAY BY ORAL ROUTE FOR 90 DAYS  1/12/23   Historical Provider, MD   omeprazole (PriLOSEC) 20 mg delayed release capsule Take 20 mg by mouth daily 1/24/23   Historical Provider, MD   OneTouch Ultra test strip USE AS DIRECTED  USE AS DIRECTED  1/10/23   Historical Provider, MD   sertraline (ZOLOFT) 50 mg tablet TAKE 1 TABLET EVERY DAY BY ORAL ROUTE  1/25/23   Historical Provider, MD   amLODIPine (NORVASC) 10 mg tablet Take 1 tablet (10 mg total) by mouth daily 10/11/19 3/11/23  Jill Richardson DO   amLODIPine (NORVASC) 2 5 mg tablet Take 5 mg by mouth  3/11/23  Historical Provider, MD   Levothyroxine Sodium 25 MCG CAPS Take 25 mcg by mouth  3/11/23  Historical Provider, MD   ondansetron (Zofran ODT) 4 mg disintegrating tablet Take 1 tablet (4 mg total) by mouth every 6 (six) hours as needed for nausea or vomiting 7/3/22 3/11/23  Jimmy Waldron MD       Allergies: No Known Allergies    Social History:     Marital Status: Single   Substance Use History:   Social History     Substance and Sexual Activity   Alcohol Use Never     Social History     Tobacco Use   Smoking Status Never   Smokeless Tobacco Never     Social History     Substance and Sexual Activity   Drug Use Never       Family History:    Pertinent family history reviewed    Physical Exam:     Vitals:   Blood Pressure: (!) 177/98 (03/11/23 1530)  Pulse: (!) 52 (03/11/23 1530)  Temperature: 97 5 °F (36 4 °C) (03/11/23 1439)  Temp Source: Oral (03/11/23 1439)  Respirations: 18 (03/11/23 1530)  Weight - Scale: 72 9 kg (160 lb 11 5 oz) (03/11/23 1439)  SpO2: 98 % (03/11/23 1530)    Physical Exam  Vitals and nursing note reviewed  Constitutional:       General: She is not in acute distress  Appearance: She is well-developed  HENT:      Head: Normocephalic and atraumatic  Eyes:      Conjunctiva/sclera: Conjunctivae normal    Cardiovascular:      Rate and Rhythm: Normal rate and regular rhythm  Heart sounds: No murmur heard  Pulmonary:      Effort: Pulmonary effort is normal  No respiratory distress  Breath sounds: Normal breath sounds  Abdominal:      Palpations: Abdomen is soft        Tenderness: There is no abdominal tenderness  Musculoskeletal:         General: No swelling  Cervical back: Neck supple  Skin:     General: Skin is warm and dry  Capillary Refill: Capillary refill takes less than 2 seconds  Neurological:      Mental Status: She is alert  Psychiatric:         Mood and Affect: Mood normal           Additional Data:     Lab Results: I have reviewed pertinent results     Results from last 7 days   Lab Units 03/11/23  1446   WBC Thousand/uL 9 79   HEMOGLOBIN g/dL 12 7   HEMATOCRIT % 38 7   PLATELETS Thousands/uL 291   NEUTROS PCT % 46   LYMPHS PCT % 44   MONOS PCT % 7   EOS PCT % 2     Results from last 7 days   Lab Units 03/11/23  1446   SODIUM mmol/L 139   POTASSIUM mmol/L 4 0   CHLORIDE mmol/L 102   CO2 mmol/L 27   BUN mg/dL 21   CREATININE mg/dL 1 19   ANION GAP mmol/L 10   CALCIUM mg/dL 9 7   ALBUMIN g/dL 4 5   TOTAL BILIRUBIN mg/dL 0 61   ALK PHOS U/L 102   ALT U/L 18   AST U/L 25   GLUCOSE RANDOM mg/dL 138*     Results from last 7 days   Lab Units 03/11/23  1446   INR  0 88     Results from last 7 days   Lab Units 03/11/23  1429   POC GLUCOSE mg/dl 134         Results from last 7 days   Lab Units 03/11/23  1449   LACTIC ACID mmol/L 2 3*       Imaging: I have reviewed pertinent imaging     CT pe study w abdomen pelvis w contrast   Final Result by Donnie Guadalupe MD (03/11 1641)      1  No pulmonary embolism or additional acute abnormality in the chest       2   No acute abnormality in the abdomen or pelvis  Workstation performed: [id number]         XR chest portable   ED Interpretation by Bg Soto PA-C (03/11 1533)   Unchanged from prior, no acute changes  CT head without contrast   Final Result by Donnie Guadalupe MD (03/11 1543)      No acute intracranial abnormality                    Workstation performed: [id number]             EKG, Pathology, and Other Studies Reviewed on Admission:   · EKG: Reviewed     Allscripts / Epic Records Reviewed    ** Please Note: This note has been constructed using a voice recognition system   **

## 2023-03-12 VITALS
DIASTOLIC BLOOD PRESSURE: 92 MMHG | HEART RATE: 57 BPM | HEIGHT: 60 IN | BODY MASS INDEX: 29.43 KG/M2 | OXYGEN SATURATION: 94 % | TEMPERATURE: 96.7 F | RESPIRATION RATE: 18 BRPM | WEIGHT: 149.91 LBS | SYSTOLIC BLOOD PRESSURE: 158 MMHG

## 2023-03-12 LAB
ANION GAP SERPL CALCULATED.3IONS-SCNC: 6 MMOL/L (ref 5–14)
BASOPHILS # BLD AUTO: 0.04 THOUSANDS/ÂΜL (ref 0–0.1)
BASOPHILS NFR BLD AUTO: 1 % (ref 0–1)
BUN SERPL-MCNC: 17 MG/DL (ref 5–25)
CALCIUM SERPL-MCNC: 9 MG/DL (ref 8.4–10.2)
CHLORIDE SERPL-SCNC: 103 MMOL/L (ref 96–108)
CO2 SERPL-SCNC: 28 MMOL/L (ref 21–32)
CREAT SERPL-MCNC: 1.19 MG/DL (ref 0.6–1.2)
EOSINOPHIL # BLD AUTO: 0.21 THOUSAND/ÂΜL (ref 0–0.61)
EOSINOPHIL NFR BLD AUTO: 3 % (ref 0–6)
ERYTHROCYTE [DISTWIDTH] IN BLOOD BY AUTOMATED COUNT: 12.7 % (ref 11.6–15.1)
GFR SERPL CREATININE-BSD FRML MDRD: 42 ML/MIN/1.73SQ M
GLUCOSE P FAST SERPL-MCNC: 117 MG/DL (ref 70–99)
GLUCOSE SERPL-MCNC: 117 MG/DL (ref 70–99)
GLUCOSE SERPL-MCNC: 121 MG/DL (ref 65–140)
HCT VFR BLD AUTO: 38 % (ref 34.8–46.1)
HGB BLD-MCNC: 12.1 G/DL (ref 11.5–15.4)
IMM GRANULOCYTES # BLD AUTO: 0.02 THOUSAND/UL (ref 0–0.2)
IMM GRANULOCYTES NFR BLD AUTO: 0 % (ref 0–2)
LYMPHOCYTES # BLD AUTO: 2.74 THOUSANDS/ÂΜL (ref 0.6–4.47)
LYMPHOCYTES NFR BLD AUTO: 35 % (ref 14–44)
MAGNESIUM SERPL-MCNC: 1.9 MG/DL (ref 1.6–2.3)
MCH RBC QN AUTO: 29.6 PG (ref 26.8–34.3)
MCHC RBC AUTO-ENTMCNC: 31.8 G/DL (ref 31.4–37.4)
MCV RBC AUTO: 93 FL (ref 82–98)
MONOCYTES # BLD AUTO: 0.61 THOUSAND/ÂΜL (ref 0.17–1.22)
MONOCYTES NFR BLD AUTO: 8 % (ref 4–12)
NEUTROPHILS # BLD AUTO: 4.23 THOUSANDS/ÂΜL (ref 1.85–7.62)
NEUTS SEG NFR BLD AUTO: 53 % (ref 43–75)
NRBC BLD AUTO-RTO: 0 /100 WBCS
PHOSPHATE SERPL-MCNC: 4.8 MG/DL (ref 2.5–4.8)
PLATELET # BLD AUTO: 277 THOUSANDS/UL (ref 149–390)
PMV BLD AUTO: 9.4 FL (ref 8.9–12.7)
POTASSIUM SERPL-SCNC: 4 MMOL/L (ref 3.5–5.3)
RBC # BLD AUTO: 4.09 MILLION/UL (ref 3.81–5.12)
SODIUM SERPL-SCNC: 137 MMOL/L (ref 135–147)
WBC # BLD AUTO: 7.85 THOUSAND/UL (ref 4.31–10.16)

## 2023-03-12 RX ORDER — HYDROMORPHONE HCL IN WATER/PF 6 MG/30 ML
0.2 PATIENT CONTROLLED ANALGESIA SYRINGE INTRAVENOUS ONCE
Status: COMPLETED | OUTPATIENT
Start: 2023-03-12 | End: 2023-03-12

## 2023-03-12 RX ORDER — ONDANSETRON 4 MG/1
4 TABLET, ORALLY DISINTEGRATING ORAL EVERY 6 HOURS PRN
Status: DISCONTINUED | OUTPATIENT
Start: 2023-03-12 | End: 2023-03-12 | Stop reason: HOSPADM

## 2023-03-12 RX ADMIN — ONDANSETRON 4 MG: 4 TABLET, ORALLY DISINTEGRATING ORAL at 09:57

## 2023-03-12 RX ADMIN — ENOXAPARIN SODIUM 30 MG: 30 INJECTION SUBCUTANEOUS at 09:13

## 2023-03-12 RX ADMIN — HYDROCHLOROTHIAZIDE 25 MG: 25 TABLET ORAL at 09:12

## 2023-03-12 RX ADMIN — HYDROMORPHONE HYDROCHLORIDE 0.2 MG: 0.2 INJECTION, SOLUTION INTRAMUSCULAR; INTRAVENOUS; SUBCUTANEOUS at 09:12

## 2023-03-12 RX ADMIN — FAMOTIDINE 20 MG: 20 TABLET, FILM COATED ORAL at 09:12

## 2023-03-12 RX ADMIN — ASPIRIN 81 MG: 81 TABLET, CHEWABLE ORAL at 09:12

## 2023-03-12 RX ADMIN — DOCUSATE SODIUM 100 MG: 100 CAPSULE, LIQUID FILLED ORAL at 05:52

## 2023-03-12 RX ADMIN — LOSARTAN POTASSIUM 50 MG: 50 TABLET, FILM COATED ORAL at 09:12

## 2023-03-12 RX ADMIN — AMLODIPINE BESYLATE 5 MG: 5 TABLET ORAL at 09:12

## 2023-03-12 RX ADMIN — LEVOTHYROXINE SODIUM 25 MCG: 25 TABLET ORAL at 05:52

## 2023-03-12 RX ADMIN — PANTOPRAZOLE SODIUM 40 MG: 40 TABLET, DELAYED RELEASE ORAL at 05:52

## 2023-03-12 NOTE — NURSING NOTE
All belongings returned to pt  IV removed  Pt in no distress and has no complaints at this time  Education and paperwork provided to pt in [address] [facility] with verbal acknowledgment of understanding  Pt escorted to lobby in wheelchair with RN

## 2023-03-12 NOTE — ASSESSMENT & PLAN NOTE
Lab Results   Component Value Date    HGBA1C 8 0 (H) 01/31/2023       Recent Labs     03/11/23  1429 03/11/23 2005 03/12/23  0457   POCGLU 134 117 121       Blood Sugar Average: Last 72 hrs:  (P) 124   · Hold home oral hypoglycemic agents  · Sliding scale insulin while inpatient

## 2023-03-12 NOTE — DISCHARGE SUMMARY
[facility]  Discharge- Huang Timmons 1940, 82 y o  female MRN: [id number]  Unit/Bed#: 7T [facility] 717-01 Encounter: [id number]  Primary Care Provider: Humaira Lawton MD   Date and time admitted to hospital: 3/11/2023  2:23 PM    * Generalized weakness  Assessment & Plan  · Unclear etiology  · COVID panel pending  · Check TSH  · PT/OT evaluation and treatment  · Case Management consultation for safe disposition planning    Acute respiratory failure with hypoxia [facility])  Assessment & Plan  · Unclear etiology  · Patient requiring 2 L of nasal cannula supplemental O2  · Chest x-ray and CT chest within normal limits  · COVID/RSV/Influenza panel pending  · Respiratory protocol  · Wean O2 as tolerated  · Goal SPO2 greater than 90%    Dyslipidemia  Assessment & Plan  · Continue home aspirin therapy    Hypertension  Assessment & Plan  · Hypertensive on presentation  · Continue home amlodipine, hydrochlorothiazide, losartan  · Monitor blood pressures    Hypothyroidism  Assessment & Plan  · Check TSH in the setting of generalized weakness  · Continue home levothyroxine 25 mcg daily    Type 2 diabetes mellitus with diabetic chronic kidney disease (HCC)  Assessment & Plan  Lab Results   Component Value Date    HGBA1C 8 0 (H) 01/31/2023       Recent Labs     03/11/23  1429 03/11/23 2005 03/12/23  0457   POCGLU 134 117 121       Blood Sugar Average: Last 72 hrs:  (P) 124   · Hold home oral hypoglycemic agents  · Sliding scale insulin while inpatient    Stage 3 chronic kidney disease [facility])  Assessment & Plan  Lab Results   Component Value Date    EGFR 42 03/12/2023    EGFR 42 03/11/2023    EGFR 38 01/31/2023    CREATININE 1 19 03/12/2023    CREATININE 1 19 03/11/2023    CREATININE 1 31 (H) 01/31/2023   · Creatinine better than baseline  · Trend BMP      Discharging Physician / Practitioner: Clara Virk DO  PCP: Humaira Lawton MD  Admission Date:   Admission Orders (From admission, onward)     Ordered        03/11/23 1719  Place in Observation  Once                      Discharge Date: 03/12/23    Medical Problems     Resolved Problems  Date Reviewed: 3/12/2023   None         Consultations During Hospital Stay: Not applicable    Procedures Performed: Not applicable    Significant Findings / Test Results:     XR chest portable    Result Date: 3/11/2023  Impression: No acute cardiopulmonary disease  Workstation performed: [id number]     CT head without contrast    Result Date: 3/11/2023  Impression: No acute intracranial abnormality  Workstation performed: [id number]     CT pe study w abdomen pelvis w contrast    Result Date: 3/11/2023  Impression: 1  No pulmonary embolism or additional acute abnormality in the chest  2   No acute abnormality in the abdomen or pelvis  Workstation performed: [id number]       Incidental Findings: Not applicable    Test Results Pending at Discharge (will require follow up): Not applicable     Outpatient Tests Requested: Not applicable    Reason for Admission: Generalized weakness    Hospital Course:     Sweta Dow is a 82 y o  female with a past medical history significant for hypertension, hyperlipidemia, type 2 diabetes mellitus, hypothyroidism who presents with complaints of generalized weakness  Laboratory analysis and chest imaging in the ED unremarkable  The patient was found to be slightly hypoxic in the ED requiring 2 L of nasal cannula supplemental O2  She was assigned observation in the setting of generalized weakness and acute hypoxic respiratory failure  The patient remained hemodynamically stable and saturating well on room air throughout her hospital course  She states she feels much improved however endorses a mild headache  She was encouraged to resume all preadmission medications at all preadmission dosages  She was also encouraged to follow-up with her PCP within 7-14 days    She states she feels well enough to go home  Condition at Discharge: stable     Discharge Day Visit / Exam:     Subjective:  Patient seen and examined at bedside  No acute events overnight  Denies chest pain, SOB, diaphoresis, nausea/vomiting/diarrhea, fevers/chills  Vitals: Blood Pressure: 130/78 (03/12/23 0741)  Pulse: 57 (03/12/23 0741)  Temperature: (!) 96 7 °F (35 9 °C) (03/12/23 0741)  Temp Source: Temporal (03/12/23 0741)  Respirations: 18 (03/12/23 0741)  Height: 5' (152 4 cm) (03/11/23 1851)  Weight - Scale: 68 kg (149 lb 14 6 oz) (03/11/23 1851)  SpO2: 94 % (03/12/23 0741)     Exam:   Physical Exam  Vitals and nursing note reviewed  Constitutional:       General: She is not in acute distress  Appearance: She is well-developed  HENT:      Head: Normocephalic and atraumatic  Eyes:      Conjunctiva/sclera: Conjunctivae normal    Cardiovascular:      Rate and Rhythm: Normal rate and regular rhythm  Heart sounds: No murmur heard  Pulmonary:      Effort: Pulmonary effort is normal  No respiratory distress  Breath sounds: Normal breath sounds  Abdominal:      Palpations: Abdomen is soft  Tenderness: There is no abdominal tenderness  Musculoskeletal:         General: No swelling  Cervical back: Neck supple  Skin:     General: Skin is warm and dry  Capillary Refill: Capillary refill takes less than 2 seconds  Neurological:      Mental Status: She is alert  Psychiatric:         Mood and Affect: Mood normal            Discharge instructions/Information to patient and family:   See after visit summary for information provided to patient and family  Provisions for Follow-Up Care:  See after visit summary for information related to follow-up care and any pertinent home health orders  Disposition:     Home with family support  Outpatient follow-up with PCP  Resume preadmission medications     Discharge Statement:  I spent 60 minutes discharging the patient   This time was spent on the day of discharge  I had direct contact with the patient on the day of discharge  Greater than 50% of the total time was spent examining patient, answering all patient questions, arranging and discussing plan of care with patient as well as directly providing post-discharge instructions  Additional time then spent on discharge activities  Discharge Medications:  See after visit summary for reconciled discharge medications provided to patient and family        ** Please Note: This note has been constructed using a voice recognition system **

## 2023-03-12 NOTE — NURSING NOTE
During patient admission, RN asked patient what brought her into the hospital using [address]   Patient stated when she went to her kidney doctor, was prescribed new medication but did not remember what the medication was called  She said when she started the medication today, that she was having generalized weakness, shortness of breath, and chest pain  Patient states that she is feeling better  This RN spoke to patients daughterJulieth  Using , I asked her if she knew the medication that her mother was recently prescribed  Daughter stated the medication taken was Jardiance but unsure of the milligrams

## 2023-03-12 NOTE — PLAN OF CARE
Problem: Potential for Falls  Goal: Patient will remain free of falls  Description: INTERVENTIONS:  - Educate patient/family on patient safety including physical limitations  - Instruct patient to call for assistance with activity   - Consult OT/PT to assist with strengthening/mobility   - Keep Call bell within reach  - Keep bed low and locked with side rails adjusted as appropriate  - Keep care items and personal belongings within reach  - Initiate and maintain comfort rounds  - Make Fall Risk Sign visible to staff  - Apply yellow socks and bracelet for high fall risk patients  - Consider moving patient to room near nurses station  Outcome: Progressing     Problem: PAIN - ADULT  Goal: Verbalizes/displays adequate comfort level or baseline comfort level  Description: Interventions:  - Encourage patient to monitor pain and request assistance  - Assess pain using appropriate pain scale  - Administer analgesics based on type and severity of pain and evaluate response  - Implement non-pharmacological measures as appropriate and evaluate response  - Consider cultural and social influences on pain and pain management  - Notify physician/advanced practitioner if interventions unsuccessful or patient reports new pain  Outcome: Progressing     Problem: INFECTION - ADULT  Goal: Absence or prevention of progression during hospitalization  Description: INTERVENTIONS:  - Assess and monitor for signs and symptoms of infection  - Monitor lab/diagnostic results  - Monitor all insertion sites, i e  indwelling lines, tubes, and drains  - Monitor endotracheal if appropriate and nasal secretions for changes in amount and color  - [place] appropriate cooling/warming therapies per order  - Administer medications as ordered  - Instruct and encourage patient and family to use good hand hygiene technique  - Identify and instruct in appropriate isolation precautions for identified infection/condition  Outcome: Progressing  Goal: Absence of fever/infection during neutropenic period  Description: INTERVENTIONS:  - Monitor WBC    Outcome: Progressing     Problem: SAFETY ADULT  Goal: Patient will remain free of falls  Description: INTERVENTIONS:  - Educate patient/family on patient safety including physical limitations  - Instruct patient to call for assistance with activity   - Consult OT/PT to assist with strengthening/mobility   - Keep Call bell within reach  - Keep bed low and locked with side rails adjusted as appropriate  - Keep care items and personal belongings within reach  - Initiate and maintain comfort rounds  - Make Fall Risk Sign visible to staff  - Apply yellow socks and bracelet for high fall risk patients  - Consider moving patient to room near nurses station  Outcome: Progressing  Goal: Maintain or return to baseline ADL function  Description: INTERVENTIONS:  -  Assess patient's ability to carry out ADLs; assess patient's baseline for ADL function and identify physical deficits which impact ability to perform ADLs (bathing, care of mouth/teeth, toileting, grooming, dressing, etc )  - Assess/evaluate cause of self-care deficits   - Assess range of motion  - Assess patient's mobility; develop plan if impaired  - Assess patient's need for assistive devices and provide as appropriate  - Encourage maximum independence but intervene and supervise when necessary  - Involve family in performance of ADLs  - Assess for home care needs following discharge   - Consider OT consult to assist with ADL evaluation and planning for discharge  - Provide patient education as appropriate  Outcome: Progressing  Goal: Maintains/Returns to pre admission functional level  Description: INTERVENTIONS:  - Perform BMAT or MOVE assessment daily    - Set and communicate daily mobility goal to care team and patient/family/caregiver     - Collaborate with rehabilitation services on mobility goals if consulted  - Out of bed for toileting  - Record patient progress and toleration of activity level   Outcome: Progressing     Problem: DISCHARGE PLANNING  Goal: Discharge to home or other facility with appropriate resources  Description: INTERVENTIONS:  - Identify barriers to discharge w/patient and caregiver  - Arrange for needed discharge resources and transportation as appropriate  - Identify discharge learning needs (meds, wound care, etc )  - Arrange for interpretive services to assist at discharge as needed  - Refer to Case Management Department for coordinating discharge planning if the patient needs post-hospital services based on physician/advanced practitioner order or complex needs related to functional status, cognitive ability, or social support system  Outcome: Progressing     Problem: Knowledge Deficit  Goal: Patient/family/caregiver demonstrates understanding of disease process, treatment plan, medications, and discharge instructions  Description: Complete learning assessment and assess knowledge base    Interventions:  - Provide teaching at level of understanding  - Provide teaching via preferred learning methods  Outcome: Progressing     Problem: RESPIRATORY - ADULT  Goal: Achieves optimal ventilation and oxygenation  Description: INTERVENTIONS:  - Assess for changes in respiratory status  - Assess for changes in mentation and behavior  - Position to facilitate oxygenation and minimize respiratory effort  - Oxygen administered by appropriate delivery if ordered  - Initiate smoking cessation education as indicated  - Encourage broncho-pulmonary hygiene including cough, deep breathe, Incentive Spirometry  - Assess the need for suctioning and aspirate as needed  - Assess and instruct to report SOB or any respiratory difficulty  - Respiratory Therapy support as indicated  Outcome: Progressing

## 2023-03-12 NOTE — ASSESSMENT & PLAN NOTE
Lab Results   Component Value Date    EGFR 42 03/12/2023    EGFR 42 03/11/2023    EGFR 38 01/31/2023    CREATININE 1 19 03/12/2023    CREATININE 1 19 03/11/2023    CREATININE 1 31 (H) 01/31/2023   · Creatinine better than baseline  · Trend BMP

## 2023-03-13 LAB
ATRIAL RATE: 55 BPM
P AXIS: 60 DEGREES
PR INTERVAL: 232 MS
QRS AXIS: -5 DEGREES
QRSD INTERVAL: 90 MS
QT INTERVAL: 442 MS
QTC INTERVAL: 422 MS
T WAVE AXIS: 56 DEGREES
VENTRICULAR RATE: 55 BPM

## 2023-03-17 ENCOUNTER — HOSPITAL ENCOUNTER (OUTPATIENT)
Dept: MRI IMAGING | Facility: HOSPITAL | Age: 83
End: 2023-03-17

## 2023-03-17 DIAGNOSIS — R41.3 AMNESIA: ICD-10-CM

## 2023-05-19 ENCOUNTER — HOSPITAL ENCOUNTER (EMERGENCY)
Facility: HOSPITAL | Age: 83
Discharge: HOME | End: 2023-05-19
Attending: EMERGENCY MEDICINE

## 2023-05-19 ENCOUNTER — APPOINTMENT (EMERGENCY)
Dept: CT IMAGING | Facility: HOSPITAL | Age: 83
End: 2023-05-19

## 2023-05-19 VITALS
TEMPERATURE: 97.8 F | RESPIRATION RATE: 22 BRPM | DIASTOLIC BLOOD PRESSURE: 87 MMHG | OXYGEN SATURATION: 98 % | BODY MASS INDEX: 28.37 KG/M2 | HEART RATE: 57 BPM | WEIGHT: 145.28 LBS | SYSTOLIC BLOOD PRESSURE: 131 MMHG

## 2023-05-19 DIAGNOSIS — K44.9 HIATAL HERNIA: Primary | ICD-10-CM

## 2023-05-19 DIAGNOSIS — R91.1 PULMONARY NODULE: ICD-10-CM

## 2023-05-19 DIAGNOSIS — J40 BRONCHITIS: ICD-10-CM

## 2023-05-19 DIAGNOSIS — K20.90 ESOPHAGITIS: ICD-10-CM

## 2023-05-19 LAB
2HR DELTA HS TROPONIN: -1 NG/L
ALBUMIN SERPL BCP-MCNC: 4.3 G/DL (ref 3.5–5)
ALP SERPL-CCNC: 97 U/L (ref 34–104)
ALT SERPL W P-5'-P-CCNC: 27 U/L (ref 7–52)
ANION GAP SERPL CALCULATED.3IONS-SCNC: 9 MMOL/L (ref 4–13)
AST SERPL W P-5'-P-CCNC: 31 U/L (ref 13–39)
ATRIAL RATE: 60 BPM
BASOPHILS # BLD AUTO: 0.04 THOUSANDS/ÂΜL (ref 0–0.1)
BASOPHILS NFR BLD AUTO: 1 % (ref 0–1)
BILIRUB SERPL-MCNC: 0.45 MG/DL (ref 0.2–1)
BUN SERPL-MCNC: 21 MG/DL (ref 5–25)
CALCIUM SERPL-MCNC: 9.4 MG/DL (ref 8.4–10.2)
CARDIAC TROPONIN I PNL SERPL HS: 2 NG/L
CARDIAC TROPONIN I PNL SERPL HS: 3 NG/L
CHLORIDE SERPL-SCNC: 98 MMOL/L (ref 96–108)
CO2 SERPL-SCNC: 27 MMOL/L (ref 21–32)
CREAT SERPL-MCNC: 1.27 MG/DL (ref 0.6–1.3)
EOSINOPHIL # BLD AUTO: 0.21 THOUSAND/ÂΜL (ref 0–0.61)
EOSINOPHIL NFR BLD AUTO: 3 % (ref 0–6)
ERYTHROCYTE [DISTWIDTH] IN BLOOD BY AUTOMATED COUNT: 13.2 % (ref 11.6–15.1)
GFR SERPL CREATININE-BSD FRML MDRD: 39 ML/MIN/1.73SQ M
GLUCOSE SERPL-MCNC: 226 MG/DL (ref 65–140)
HCT VFR BLD AUTO: 38.3 % (ref 34.8–46.1)
HGB BLD-MCNC: 12.5 G/DL (ref 11.5–15.4)
IMM GRANULOCYTES # BLD AUTO: 0.02 THOUSAND/UL (ref 0–0.2)
IMM GRANULOCYTES NFR BLD AUTO: 0 % (ref 0–2)
LIPASE SERPL-CCNC: 70 U/L (ref 11–82)
LYMPHOCYTES # BLD AUTO: 2.95 THOUSANDS/ÂΜL (ref 0.6–4.47)
LYMPHOCYTES NFR BLD AUTO: 41 % (ref 14–44)
MCH RBC QN AUTO: 30.3 PG (ref 26.8–34.3)
MCHC RBC AUTO-ENTMCNC: 32.6 G/DL (ref 31.4–37.4)
MCV RBC AUTO: 93 FL (ref 82–98)
MONOCYTES # BLD AUTO: 0.47 THOUSAND/ÂΜL (ref 0.17–1.22)
MONOCYTES NFR BLD AUTO: 7 % (ref 4–12)
NEUTROPHILS # BLD AUTO: 3.56 THOUSANDS/ÂΜL (ref 1.85–7.62)
NEUTS SEG NFR BLD AUTO: 48 % (ref 43–75)
NRBC BLD AUTO-RTO: 0 /100 WBCS
P AXIS: 71 DEGREES
PLATELET # BLD AUTO: 276 THOUSANDS/UL (ref 149–390)
PMV BLD AUTO: 9.1 FL (ref 8.9–12.7)
POTASSIUM SERPL-SCNC: 4.5 MMOL/L (ref 3.5–5.3)
PR INTERVAL: 212 MS
PROT SERPL-MCNC: 7.6 G/DL (ref 6.4–8.4)
QRS AXIS: 40 DEGREES
QRSD INTERVAL: 86 MS
QT INTERVAL: 414 MS
QTC INTERVAL: 414 MS
RBC # BLD AUTO: 4.13 MILLION/UL (ref 3.81–5.12)
SODIUM SERPL-SCNC: 134 MMOL/L (ref 135–147)
T WAVE AXIS: 82 DEGREES
VENTRICULAR RATE: 60 BPM
WBC # BLD AUTO: 7.25 THOUSAND/UL (ref 4.31–10.16)

## 2023-05-19 RX ORDER — ALBUTEROL SULFATE 90 UG/1
2 AEROSOL, METERED RESPIRATORY (INHALATION) ONCE
Status: COMPLETED | OUTPATIENT
Start: 2023-05-19 | End: 2023-05-19

## 2023-05-19 RX ORDER — BENZONATATE 100 MG/1
100 CAPSULE ORAL EVERY 8 HOURS
Qty: 21 CAPSULE | Refills: 0 | Status: SHIPPED | OUTPATIENT
Start: 2023-05-19

## 2023-05-19 RX ORDER — BENZONATATE 100 MG/1
200 CAPSULE ORAL ONCE
Status: COMPLETED | OUTPATIENT
Start: 2023-05-19 | End: 2023-05-19

## 2023-05-19 RX ADMIN — SODIUM CHLORIDE 1000 ML: 0.9 INJECTION, SOLUTION INTRAVENOUS at 10:23

## 2023-05-19 RX ADMIN — IOHEXOL 100 ML: 350 INJECTION, SOLUTION INTRAVENOUS at 11:22

## 2023-05-19 RX ADMIN — BENZONATATE 200 MG: 100 CAPSULE ORAL at 10:35

## 2023-05-19 RX ADMIN — ALBUTEROL SULFATE 2 PUFF: 90 AEROSOL, METERED RESPIRATORY (INHALATION) at 12:09

## 2023-05-19 NOTE — ED PROCEDURE NOTE
PROCEDURE  ECG 12 Lead Documentation Only    Date/Time: 5/19/2023 12:19 PM  Performed by: Lawanda Piña DO  Authorized by: Lawanda Piña DO     ECG reviewed by me, the ED Provider: yes    Patient location:  ED  Previous ECG:     Previous ECG:  Compared to current    Similarity:  No change  Interpretation:     Interpretation: normal    Rate:     ECG rate assessment: normal    Rhythm:     Rhythm: sinus rhythm    Ectopy:     Ectopy: none    QRS:     QRS axis:  Normal    QRS intervals:  Normal  Conduction:     Conduction: normal    ST segments:     ST segments:  Normal  T waves:     T waves: normal           Lawanda Piña DO  05/19/23 1219

## 2023-05-19 NOTE — Clinical Note
Krissy Marina was seen and treated in our emergency department on 5/19/2023  Diagnosis:     Kayla Fernandes  may return to work on return date  She may return on this date: 05/23/2023         If you have any questions or concerns, please don't hesitate to call        Lawanda Piña, DO    ______________________________           _______________          _______________  Hospital Representative                              Date                                Time

## 2023-05-19 NOTE — ED PROVIDER NOTES
History  Chief Complaint   Patient presents with   • Flu Symptoms     Cough and pain x 2 weeks  Pain in belly and chest increases with cough       Flu Symptoms  Presenting symptoms: cough and myalgias    Presenting symptoms: no diarrhea, no fever, no headaches, no nausea, no rhinorrhea, no shortness of breath and no sore throat    Severity:  Mild  Onset quality:  Gradual  Progression:  Worsening  Chronicity:  New  Relieved by:  Nothing  Worsened by:  Nothing  Ineffective treatments:  None tried  Associated symptoms: chills    Associated symptoms: no neck stiffness    Risk factors: being elderly    Abdominal Pain  Pain location:  Generalized  Pain quality: aching and cramping    Pain radiates to:  Does not radiate  Pain severity:  Moderate  Onset quality:  Gradual  Timing:  Constant  Progression:  Worsening  Chronicity:  New  Relieved by:  Nothing  Worsened by:  Coughing, deep breathing, movement and palpation  Ineffective treatments:  None tried  Associated symptoms: chills and cough    Associated symptoms: no chest pain, no diarrhea, no dysuria, no fever, no nausea, no shortness of breath and no sore throat        Prior to Admission Medications   Prescriptions Last Dose Informant Patient Reported? Taking? Aspirin 81 MG CAPS   Yes No   Sig: Take 81 mg by mouth daily   Patient not taking: Reported on 3/11/2023   Diclofenac Sodium (VOLTAREN) 1 %   No No   Sig: Apply to left shoulder region 4 times aday  GNP Alcohol Swabs 70 % PADS   Yes No   Sig: TEST 2-3 TIMES A DAY AS DIRECTED TEST 2-3 TIMES A DAY AS DIRECTED   Global Inject Ease Lancets 30G MISC   Yes No   Sig: TEST 2-3 TIMES A DAY AS DIRECTED TEST 2-3 TIMES A DAY AS DIRECTED   OneTouch Ultra test strip   Yes No   Sig: USE AS DIRECTED   USE AS DIRECTED    amLODIPine (NORVASC) 5 mg tablet   Yes No   Sig: TAKE 1 TABLET EVERY DAY BY ORAL ROUTE   atorvastatin (LIPITOR) 80 mg tablet   Yes No   Sig: Take 80 mg by mouth   cholecalciferol (VITAMIN D3) 1,000 units tablet Yes No   Sig: TAKE 1 TABLET(S) EVERY DAY BY ORAL ROUTE FOR 90 DAYS  ciclopirox (PENLAC) 8 % solution   Yes No   Sig: APPLY TO THE AFFECTED AREA(S) BY TOPICAL ROUTE ONCE DAILY PREFERABLY AT BEDTIME OR 8 HOURS BEFORE WASHING   docusate sodium (COLACE) 100 mg capsule   No No   Sig: Take 1 capsule (100 mg total) by mouth every 12 (twelve) hours   famotidine (PEPCID) 40 MG tablet   Yes No   Sig: TAKE ONE TABLET BY MOUTH DAILY HE 1 TABLETA POR VIA ORAL DIARIAMENTE   hydrochlorothiazide (HYDRODIURIL) 25 mg tablet   Yes No   Sig: Take 25 mg by mouth   levothyroxine 25 mcg tablet   Yes No   Sig: TAKE ONE TABLET BY MOUTH DAILY HE 1 TABLETA POR VIA ORAL DIARIAMENTE   losartan (COZAAR) 50 mg tablet   Yes No   Sig: TAKE 1 TABLET EVERY DAY BY ORAL ROUTE FOR 90 DAYS  omega-3-acid ethyl esters (LOVAZA) 1 g capsule   Yes No   Sig: TAKE 2 CAPSULE(S) TWICE A DAY BY ORAL ROUTE FOR 90 DAYS  omeprazole (PriLOSEC) 20 mg delayed release capsule   Yes No   Sig: Take 20 mg by mouth daily   sertraline (ZOLOFT) 50 mg tablet   Yes No   Sig: TAKE 1 TABLET EVERY DAY BY ORAL ROUTE  Facility-Administered Medications: None       Past Medical History:   Diagnosis Date   • Diabetes mellitus ([facility] )    • Heart abnormality    • Hyperlipidemia    • Hypertension    • Renal disorder        Past Surgical History:   Procedure Laterality Date   •  SECTION         History reviewed  No pertinent family history  I have reviewed and agree with the history as documented  E-Cigarette/Vaping   • E-Cigarette Use Never User      E-Cigarette/Vaping Substances   • Nicotine No    • THC No    • CBD No    • Flavoring No    • Other No    • Unknown No      Social History     Tobacco Use   • Smoking status: Never   • Smokeless tobacco: Never   Vaping Use   • Vaping Use: Never used   Substance Use Topics   • Alcohol use: Never   • Drug use: Never       Review of Systems   Constitutional: Positive for chills  Negative for fever     HENT: Negative for rhinorrhea, sore throat and trouble swallowing  Eyes: Negative for pain  Respiratory: Positive for cough  Negative for shortness of breath, wheezing and stridor  Cardiovascular: Negative for chest pain and leg swelling  Gastrointestinal: Positive for abdominal pain  Negative for diarrhea and nausea  Endocrine: Negative for polyuria  Genitourinary: Negative for dysuria, flank pain and urgency  Musculoskeletal: Positive for myalgias  Negative for joint swelling and neck stiffness  Skin: Negative for rash  Allergic/Immunologic: Negative for immunocompromised state  Neurological: Negative for dizziness, syncope, weakness, numbness and headaches  Psychiatric/Behavioral: Negative for confusion and suicidal ideas  All other systems reviewed and are negative  Physical Exam  Physical Exam  Vitals and nursing note reviewed  Constitutional:       Appearance: Normal appearance  She is well-developed  HENT:      Head: Normocephalic and atraumatic  Nose: Nose normal       Mouth/Throat:      Mouth: Mucous membranes are moist    Eyes:      Extraocular Movements: Extraocular movements intact  Pupils: Pupils are equal, round, and reactive to light  Cardiovascular:      Rate and Rhythm: Normal rate and regular rhythm  Heart sounds: No murmur heard  No friction rub  Pulmonary:      Effort: No respiratory distress  Breath sounds: Examination of the right-lower field reveals rhonchi  Examination of the left-lower field reveals rhonchi  Rhonchi present  No wheezing or rales  Abdominal:      General: Bowel sounds are normal  There is no distension  Palpations: Abdomen is soft  Tenderness: There is generalized abdominal tenderness  Musculoskeletal:         General: No tenderness  Normal range of motion  Cervical back: Normal range of motion and neck supple  Skin:     General: Skin is warm  Findings: No rash     Neurological:      Mental Status: She is alert and oriented to person, place, and time     Psychiatric:         Mood and Affect: Mood normal          Vital Signs  ED Triage Vitals [05/19/23 1010]   Temperature Pulse Respirations Blood Pressure SpO2   97 8 °F (36 6 °C) 72 18 164/96 98 %      Temp Source Heart Rate Source Patient Position - Orthostatic VS BP Location FiO2 (%)   Tympanic Monitor Sitting Left arm --      Pain Score       7           Vitals:    05/19/23 1010 05/19/23 1203 05/19/23 1216   BP: 164/96 (!) 185/82 131/87   Pulse: 72 59 57   Patient Position - Orthostatic VS: Sitting Lying Lying         Visual Acuity      ED Medications  Medications   sodium chloride 0 9 % bolus 1,000 mL (0 mL Intravenous Stopped 5/19/23 1152)   benzonatate (TESSALON PERLES) capsule 200 mg (200 mg Oral Given 5/19/23 1035)   iohexol (OMNIPAQUE) 350 MG/ML injection (SINGLE-DOSE) 100 mL (100 mL Intravenous Given 5/19/23 1122)   albuterol (PROVENTIL HFA,VENTOLIN HFA) inhaler 2 puff (2 puffs Inhalation Given 5/19/23 1209)       Diagnostic Studies  Results Reviewed     Procedure Component Value Units Date/Time    HS Troponin I 2hr [[id number]]  (Normal) Collected: 05/19/23 1200    Lab Status: Final result Specimen: Blood from Arm, Left Updated: 05/19/23 1229     hs TnI 2hr 2 ng/L      Delta 2hr hsTnI -1 ng/L     Comprehensive metabolic panel [[id number]]  (Abnormal) Collected: 05/19/23 1022    Lab Status: Final result Specimen: Blood from Arm, Left Updated: 05/19/23 1055     Sodium 134 mmol/L      Potassium 4 5 mmol/L      Chloride 98 mmol/L      CO2 27 mmol/L      ANION GAP 9 mmol/L      BUN 21 mg/dL      Creatinine 1 27 mg/dL      Glucose 226 mg/dL      Calcium 9 4 mg/dL      AST 31 U/L      ALT 27 U/L      Alkaline Phosphatase 97 U/L      Total Protein 7 6 g/dL      Albumin 4 3 g/dL      Total Bilirubin 0 45 mg/dL      eGFR 39 ml/min/1 73sq m     Narrative:      [place] guidelines for Chronic Kidney Disease (CKD):   •  Stage 1 with normal or high GFR (GFR > 90 mL/min/1 73 square meters)  •  Stage 2 Mild CKD (GFR = 60-89 mL/min/1 73 square meters)  •  Stage 3A Moderate CKD (GFR = 45-59 mL/min/1 73 square meters)  •  Stage 3B Moderate CKD (GFR = 30-44 mL/min/1 73 square meters)  •  Stage 4 Severe CKD (GFR = 15-29 mL/min/1 73 square meters)  •  Stage 5 End Stage CKD (GFR <15 mL/min/1 73 square meters)  Note: GFR calculation is accurate only with a steady state creatinine    Lipase [[id number]]  (Normal) Collected: 05/19/23 1022    Lab Status: Final result Specimen: Blood from Arm, Left Updated: 05/19/23 1055     Lipase 70 u/L     HS Troponin 0hr (reflex protocol) [[id number]]  (Normal) Collected: 05/19/23 1022    Lab Status: Final result Specimen: Blood from Arm, Left Updated: 05/19/23 1055     hs TnI 0hr 3 ng/L     CBC and differential [[id number]] Collected: 05/19/23 1022    Lab Status: Final result Specimen: Blood from Arm, Left Updated: 05/19/23 1032     WBC 7 25 Thousand/uL      RBC 4 13 Million/uL      Hemoglobin 12 5 g/dL      Hematocrit 38 3 %      MCV 93 fL      MCH 30 3 pg      MCHC 32 6 g/dL      RDW 13 2 %      MPV 9 1 fL      Platelets 611 Thousands/uL      nRBC 0 /100 WBCs      Neutrophils Relative 48 %      Immat GRANS % 0 %      Lymphocytes Relative 41 %      Monocytes Relative 7 %      Eosinophils Relative 3 %      Basophils Relative 1 %      Neutrophils Absolute 3 56 Thousands/µL      Immature Grans Absolute 0 02 Thousand/uL      Lymphocytes Absolute 2 95 Thousands/µL      Monocytes Absolute 0 47 Thousand/µL      Eosinophils Absolute 0 21 Thousand/µL      Basophils Absolute 0 04 Thousands/µL                  CT chest abdomen pelvis w contrast   Final Result by Jane Frances MD (05/19 9251)      No acute consolidation   No intra-abdominal fluid collection  Hiatal hernia   Mild thickening of the GE junction, evaluate for esophagitis/reflux      Incidentally detected are less than 6 mm nodules in the left and right lung   Based on current Fleischner Society 2017 Guidelines on incidental pulmonary nodule, no routine follow-up is needed if the patient is low risk  If the patient is high risk,    optional follow-up chest CT at 12 months can be considered  Workstation performed: [id number]                    Procedures  Procedures         ED Course                                             Medical Decision Making  Background: 82 y o  female presents with chief complaint of generalized abdominal pain with cough and viral symptoms     Differential: appendicitis, diverticulitis, mesenteric adenitis, ovarian cyst, cystitis, pyelonephritis, ureterolithiasis, constipation, colitis, gastric ulcer, mesenteric ischemia, perforated viscous, non specific abdominal pain    Plan: cbc, cmp, lipase, urinalysis, ct scan, symptom control     Scan findings at this point time showed noted pulmonary nodule as well as some esophagitis  Likely the cause of patient's symptoms diagnosed with bronchitis, follow-up as an outpatient with follow-up  Amount and/or Complexity of Data Reviewed  Labs: ordered  Radiology: ordered  Risk  Prescription drug management  Disposition  Final diagnoses:   Hiatal hernia   Pulmonary nodule   Esophagitis   Bronchitis     Time reflects when diagnosis was documented in both MDM as applicable and the Disposition within this note     Time User Action Codes Description Comment    5/19/2023 12:06 PM Vester Bueno Add [K44 9] Hiatal hernia     5/19/2023 12:06 PM Blank Danker R Add [R91 1] Pulmonary nodule     5/19/2023 12:06 PM Vester Bueno Add [K20 90] Esophagitis     5/19/2023 12:06 PM Vester Bueno Add [J40] Bronchitis       ED Disposition     ED Disposition   Discharge    Condition   Stable    Date/Time   Fri May 19, 2023 12:06 PM    Comment   Blaise Favre discharge to home/self care                 Follow-up Information    None         Discharge Medication List as of 5/19/2023 12:07 PM      START taking these medications    Details   benzonatate (TESSALON PERLES) 100 mg capsule Take 1 capsule (100 mg total) by mouth every 8 (eight) hours, Starting Fri 5/19/2023, Normal         CONTINUE these medications which have NOT CHANGED    Details   amLODIPine (NORVASC) 5 mg tablet TAKE 1 TABLET EVERY DAY BY ORAL ROUTE, Historical Med      Aspirin 81 MG CAPS Take 81 mg by mouth daily, Historical Med      atorvastatin (LIPITOR) 80 mg tablet Take 80 mg by mouth, Historical Med      cholecalciferol (VITAMIN D3) 1,000 units tablet TAKE 1 TABLET(S) EVERY DAY BY ORAL ROUTE FOR 90 DAYS , Historical Med      ciclopirox (PENLAC) 8 % solution APPLY TO THE AFFECTED AREA(S) BY TOPICAL ROUTE ONCE DAILY PREFERABLY AT BEDTIME OR 8 HOURS BEFORE WASHING, Historical Med      Diclofenac Sodium (VOLTAREN) 1 % Apply to left shoulder region 4 times aday  , Print      docusate sodium (COLACE) 100 mg capsule Take 1 capsule (100 mg total) by mouth every 12 (twelve) hours, Starting Sun 7/3/2022, Normal      famotidine (PEPCID) 40 MG tablet TAKE ONE TABLET BY MOUTH DAILY HE 1 TABLETA POR VIA ORAL DIARIAMENTE, Historical Med      Global Inject Ease Lancets 30G MISC TEST 2-3 TIMES A DAY AS DIRECTED TEST 2-3 TIMES A DAY AS DIRECTED, Historical Med      GNP Alcohol Swabs 70 % PADS TEST 2-3 TIMES A DAY AS DIRECTED TEST 2-3 TIMES A DAY AS DIRECTED, Historical Med      hydrochlorothiazide (HYDRODIURIL) 25 mg tablet Take 25 mg by mouth, Historical Med      levothyroxine 25 mcg tablet TAKE ONE TABLET BY MOUTH DAILY HE 1 TABLETA POR VIA ORAL DIARIAMENTE, Historical Med      losartan (COZAAR) 50 mg tablet TAKE 1 TABLET EVERY DAY BY ORAL ROUTE FOR 90 DAYS , Historical Med      omega-3-acid ethyl esters (LOVAZA) 1 g capsule TAKE 2 CAPSULE(S) TWICE A DAY BY ORAL ROUTE FOR 90 DAYS , Historical Med      omeprazole (PriLOSEC) 20 mg delayed release capsule Take 20 mg by mouth daily, Starting Tue 1/24/2023, Historical Med      OneTouch Ultra test strip USE AS DIRECTED  USE AS DIRECTED , Historical Med      sertraline (ZOLOFT) 50 mg tablet TAKE 1 TABLET EVERY DAY BY ORAL ROUTE , Historical Med             No discharge procedures on file      PDMP Review     None          ED Provider  Electronically Signed by           Dinesh Nur DO  05/19/23 0770

## 2023-09-15 ENCOUNTER — APPOINTMENT (OUTPATIENT)
Dept: LAB | Facility: HOSPITAL | Age: 83
End: 2023-09-15
Payer: COMMERCIAL

## 2023-09-15 DIAGNOSIS — E03.9 HYPOTHYROIDISM, UNSPECIFIED TYPE: ICD-10-CM

## 2023-09-15 DIAGNOSIS — I10 ESSENTIAL (PRIMARY) HYPERTENSION: ICD-10-CM

## 2023-09-15 LAB
ALBUMIN SERPL BCP-MCNC: 4.3 G/DL (ref 3.5–5)
ALP SERPL-CCNC: 66 U/L (ref 34–104)
ALT SERPL W P-5'-P-CCNC: 13 U/L (ref 7–52)
ANION GAP SERPL CALCULATED.3IONS-SCNC: 10 MMOL/L
AST SERPL W P-5'-P-CCNC: 19 U/L (ref 13–39)
BILIRUB SERPL-MCNC: 0.46 MG/DL (ref 0.2–1)
BUN SERPL-MCNC: 26 MG/DL (ref 5–25)
CALCIUM SERPL-MCNC: 10.1 MG/DL (ref 8.4–10.2)
CHLORIDE SERPL-SCNC: 103 MMOL/L (ref 96–108)
CHOLEST SERPL-MCNC: 412 MG/DL
CO2 SERPL-SCNC: 27 MMOL/L (ref 21–32)
CREAT SERPL-MCNC: 1.36 MG/DL (ref 0.6–1.3)
GFR SERPL CREATININE-BSD FRML MDRD: 36 ML/MIN/1.73SQ M
GLUCOSE P FAST SERPL-MCNC: 119 MG/DL (ref 65–99)
HDLC SERPL-MCNC: 40 MG/DL
LDLC SERPL CALC-MCNC: 333 MG/DL (ref 0–100)
NONHDLC SERPL-MCNC: 372 MG/DL
POTASSIUM SERPL-SCNC: 4.8 MMOL/L (ref 3.5–5.3)
PROT SERPL-MCNC: 7.5 G/DL (ref 6.4–8.4)
SODIUM SERPL-SCNC: 140 MMOL/L (ref 135–147)
TRIGL SERPL-MCNC: 195 MG/DL
TSH SERPL DL<=0.05 MIU/L-ACNC: 4.54 UIU/ML (ref 0.45–4.5)

## 2023-09-15 PROCEDURE — 84443 ASSAY THYROID STIM HORMONE: CPT

## 2023-09-15 PROCEDURE — 80061 LIPID PANEL: CPT

## 2023-09-15 PROCEDURE — 36415 COLL VENOUS BLD VENIPUNCTURE: CPT

## 2023-09-15 PROCEDURE — 80053 COMPREHEN METABOLIC PANEL: CPT

## 2023-09-28 ENCOUNTER — EVALUATION (OUTPATIENT)
Dept: PHYSICAL THERAPY | Facility: CLINIC | Age: 83
End: 2023-09-28
Payer: COMMERCIAL

## 2023-09-28 DIAGNOSIS — G89.29 CHRONIC LOW BACK PAIN, UNSPECIFIED BACK PAIN LATERALITY, UNSPECIFIED WHETHER SCIATICA PRESENT: Primary | ICD-10-CM

## 2023-09-28 DIAGNOSIS — M54.50 CHRONIC LOW BACK PAIN, UNSPECIFIED BACK PAIN LATERALITY, UNSPECIFIED WHETHER SCIATICA PRESENT: Primary | ICD-10-CM

## 2023-09-28 PROCEDURE — 97161 PT EVAL LOW COMPLEX 20 MIN: CPT | Performed by: PHYSICAL MEDICINE & REHABILITATION

## 2023-09-28 NOTE — LETTER
2023    Amor Sterling MD  [address] [place] [address] [address] [address]    Patient: Liat Calles   YOB: 1940   Date of Visit: 2023     Encounter Diagnosis     ICD-10-CM    1. Chronic low back pain, unspecified back pain laterality, unspecified whether sciatica present  M54.50     G89.29           Dear Dr. Anita Lozoya:    Thank you for your recent referral of Liat Calles. Please review the attached evaluation summary from Shelby's recent visit. Please verify that you agree with the plan of care by signing the attached order. If you have any questions or concerns, please do not hesitate to call. I sincerely appreciate the opportunity to share in the care of one of your patients and hope to have another opportunity to work with you in the near future. Sincerely,    Jacey Obregon, PT      Referring Provider:      I certify that I have read the below Plan of Care and certify the need for these services furnished under this plan of treatment while under my care. Amor Sterling MD  [address] [place] [address] [address] [address]  Via Fax: [phone]          PT Evaluation     Today's date: 2023  Patient name: Liat Calles  : 1940  MRN: [id number]  Referring provider: Amor Sterling MD  Dx:   Encounter Diagnosis     ICD-10-CM    1. Chronic low back pain, unspecified back pain laterality, unspecified whether sciatica present  M54.50     G89.29                    Assessment  Assessment details: Pt is a pleasant 82 y.o. female presenting to outpatient physical therapy with sgs/sxs that appear consistent with referring diagnosis. Pt presents with pain, decreased range of motion, decreased strength, and decreased tolerance to activity. Pt is a good candidate for outpatient physical therapy and would benefit from skilled physical therapy to address limitations and achieve goals.  If patient symptoms fail to improve, will suggest return to referring physician for followup and likely additional imaging. Thank you for this referral.   Impairments: abnormal coordination, abnormal or restricted ROM, activity intolerance, impaired physical strength and pain with function  Understanding of Dx/Px/POC: good   Prognosis: good    Goals  ST. Patient will report 25% decrease in pain in 4 weeks. 2. Patient will demonstrate 25% improvement in ROM in 4 weeks. 3. Patient will demonstrate 1/2 grade improvement in strength in 4 weeks. LT. Patient will be able to perform IADLS without restriction or pain by discharge. 2. Patient will be independent in HEP by discharge. 3. Patient will be able to return to recreational/work duties without restriction or pain by discharge. Plan  Patient would benefit from: PT eval and skilled PT  Planned modality interventions: cryotherapy and thermotherapy: hydrocollator packs  Planned therapy interventions: IADL retraining, body mechanics training, flexibility, functional ROM exercises, home exercise program, neuromuscular re-education, manual therapy, postural training, strengthening, stretching, therapeutic activities, therapeutic exercise and joint mobilization  Frequency: 2x week  Duration in visits: 12  Duration in weeks: 6  Treatment plan discussed with: patient        Subjective Evaluation    History of Present Illness  Mechanism of injury: Patient presents with c/o LBP of insidious onset present for a few months. Pain present through mid and lower back. Cramping through both legs. 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 Decreased pain with medication. Patient denies N/T. Stabbing pain. Patient works as a  daily.      Ipad used for  services  Patient Goals  Patient goals for therapy: decreased pain    Pain  Current pain rating: 10  At best pain rating: 10  At worst pain rating: 10          Objective     Active Range of Motion     Additional Active Range of Motion Details  Moderate AROM limitation in all planes with pain at end range  (+) Etienne's sg with return from flexion    General Comments:      Lumbar Comments  (-) LE neural tension  Knee flex/ext, hip flex 4+/5  Limited hip testing secondary to guarding despite verbal and tactile cueing  TTP, sensitive to light touch throughout paraspinals  - Palpable soft tissue restrictions  Cueing throughout to avoid valsalva with testing activity        Precautions: kidney dysfunction    Manuals             STM paraspinals                                                    Neuro Re-Ed             pball press down             Diaphragmatic breathing             TA contraction             TA progression                                                    Ther Ex             pulleys             pball rollouts             Seated HS st HEP            Lateral trunk stretch                                                                 Ther Activity                                       Gait Training                                       Modalities

## 2023-09-28 NOTE — PROGRESS NOTES
PT Evaluation     Today's date: 2023  Patient name: Lexa Felix  : 1940  MRN: [id number]  Referring provider: Eva Slade MD  Dx:   Encounter Diagnosis     ICD-10-CM    1. Chronic low back pain, unspecified back pain laterality, unspecified whether sciatica present  M54.50     G89.29                    Assessment  Assessment details: Pt is a pleasant 82 y.o. female presenting to outpatient physical therapy with sgs/sxs that appear consistent with referring diagnosis. Pt presents with pain, decreased range of motion, decreased strength, and decreased tolerance to activity. Pt is a good candidate for outpatient physical therapy and would benefit from skilled physical therapy to address limitations and achieve goals. If patient symptoms fail to improve, will suggest return to referring physician for followup and likely additional imaging. Thank you for this referral.   Impairments: abnormal coordination, abnormal or restricted ROM, activity intolerance, impaired physical strength and pain with function  Understanding of Dx/Px/POC: good   Prognosis: good    Goals  ST. Patient will report 25% decrease in pain in 4 weeks. 2. Patient will demonstrate 25% improvement in ROM in 4 weeks. 3. Patient will demonstrate 1/2 grade improvement in strength in 4 weeks. LT. Patient will be able to perform IADLS without restriction or pain by discharge. 2. Patient will be independent in HEP by discharge. 3. Patient will be able to return to recreational/work duties without restriction or pain by discharge.       Plan  Patient would benefit from: PT eval and skilled PT  Planned modality interventions: cryotherapy and thermotherapy: hydrocollator packs  Planned therapy interventions: IADL retraining, body mechanics training, flexibility, functional ROM exercises, home exercise program, neuromuscular re-education, manual therapy, postural training, strengthening, stretching, therapeutic activities, therapeutic exercise and joint mobilization  Frequency: 2x week  Duration in visits: 12  Duration in weeks: 6  Treatment plan discussed with: patient        Subjective Evaluation    History of Present Illness  Mechanism of injury: Patient presents with c/o LBP of insidious onset present for a few months. Pain present through mid and lower back. Cramping through both legs. 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 Decreased pain with medication. Patient denies N/T. Stabbing pain. Patient works as a  daily.      Ipad used for  services  Patient Goals  Patient goals for therapy: decreased pain    Pain  Current pain rating: 10  At best pain rating: 10  At worst pain rating: 10          Objective     Active Range of Motion     Additional Active Range of Motion Details  Moderate AROM limitation in all planes with pain at end range  (+) Etienne's sg with return from flexion    General Comments:      Lumbar Comments  (-) LE neural tension  Knee flex/ext, hip flex 4+/5  Limited hip testing secondary to guarding despite verbal and tactile cueing  TTP, sensitive to light touch throughout paraspinals  - Palpable soft tissue restrictions  Cueing throughout to avoid valsalva with testing activity         Precautions: kidney dysfunction    Manuals             STM paraspinals                                                    Neuro Re-Ed             pball press down             Diaphragmatic breathing             TA contraction             TA progression                                                    Ther Ex             pulleys             pball rollouts             Seated HS st HEP            Lateral trunk stretch                                                                 Ther Activity                                       Gait Training                                       Modalities

## 2023-11-26 ENCOUNTER — HOSPITAL ENCOUNTER (EMERGENCY)
Facility: HOSPITAL | Age: 83
Discharge: HOME | End: 2023-11-26
Attending: EMERGENCY MEDICINE
Payer: COMMERCIAL

## 2023-11-26 VITALS
OXYGEN SATURATION: 95 % | WEIGHT: 143.8 LBS | SYSTOLIC BLOOD PRESSURE: 128 MMHG | BODY MASS INDEX: 28.08 KG/M2 | HEART RATE: 56 BPM | TEMPERATURE: 97.7 F | DIASTOLIC BLOOD PRESSURE: 78 MMHG | RESPIRATION RATE: 18 BRPM

## 2023-11-26 DIAGNOSIS — R52 GENERALIZED BODY ACHES: ICD-10-CM

## 2023-11-26 DIAGNOSIS — M54.50 ACUTE EXACERBATION OF CHRONIC LOW BACK PAIN: Primary | ICD-10-CM

## 2023-11-26 DIAGNOSIS — G89.29 ACUTE EXACERBATION OF CHRONIC LOW BACK PAIN: Primary | ICD-10-CM

## 2023-11-26 PROCEDURE — 99284 EMERGENCY DEPT VISIT MOD MDM: CPT | Performed by: EMERGENCY MEDICINE

## 2023-11-26 PROCEDURE — 99282 EMERGENCY DEPT VISIT SF MDM: CPT

## 2023-11-26 RX ORDER — EZETIMIBE 10 MG/1
10 TABLET ORAL DAILY
COMMUNITY

## 2023-11-26 RX ORDER — LIDOCAINE 50 MG/G
2 PATCH TOPICAL ONCE
Status: DISCONTINUED | OUTPATIENT
Start: 2023-11-26 | End: 2023-11-26 | Stop reason: HOSPADM

## 2023-11-26 RX ORDER — LIDOCAINE 50 MG/G
PATCH TOPICAL
COMMUNITY
Start: 2023-09-25

## 2023-11-26 RX ORDER — ACETAMINOPHEN 325 MG/1
975 TABLET ORAL ONCE
Status: COMPLETED | OUTPATIENT
Start: 2023-11-26 | End: 2023-11-26

## 2023-11-26 RX ORDER — LIDOCAINE 50 MG/G
3 PATCH TOPICAL ONCE
Status: DISCONTINUED | OUTPATIENT
Start: 2023-11-26 | End: 2023-11-26

## 2023-11-26 RX ORDER — SENNA 8.6 MG/1
TABLET, FILM COATED ORAL
COMMUNITY
Start: 2023-09-25

## 2023-11-26 RX ORDER — DONEPEZIL HYDROCHLORIDE 10 MG/1
TABLET, FILM COATED ORAL
COMMUNITY
Start: 2023-09-26

## 2023-11-26 RX ORDER — LIDOCAINE 50 MG/G
1 PATCH TOPICAL DAILY
Qty: 6 PATCH | Refills: 0 | Status: SHIPPED | OUTPATIENT
Start: 2023-11-26

## 2023-11-26 RX ADMIN — LIDOCAINE 2 PATCH: 700 PATCH TOPICAL at 12:00

## 2023-11-26 RX ADMIN — ACETAMINOPHEN 975 MG: 325 TABLET ORAL at 12:01

## 2023-11-26 NOTE — ED PROVIDER NOTES
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 Complains of pain from the top of shoulders down to her legs. No meds taken. Denies any injuries or falls at home. HPI  Patient is an 72-year-old female presenting with generalized body aches. 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 Pain is mostly in the back with the lower back being worse. No midline pain. States that is diffusely painful and denies any lower extremity weakness or numbness. Patient also denies any saddle anesthesia. Reports no bowel/urinary incontinence/retention. Patient denies any blood thinner use, recent infection, cancer history, back procedure, trauma. Also reports no dysuria or hematuria. Prior to Admission Medications   Prescriptions Last Dose Informant Patient Reported? Taking? Aspirin 81 MG CAPS   Yes No   Sig: Take 81 mg by mouth daily   Patient not taking: Reported on 3/11/2023   Diclofenac Sodium (VOLTAREN) 1 %   No No   Sig: Apply to left shoulder region 4 times aday. GNP Alcohol Swabs 70 % PADS   Yes No   Sig: TEST 2-3 TIMES A DAY AS DIRECTED TEST 2-3 TIMES A DAY AS DIRECTED   Global Inject Ease Lancets 30G MISC   Yes No   Sig: TEST 2-3 TIMES A DAY AS DIRECTED TEST 2-3 TIMES A DAY AS DIRECTED   OneTouch Ultra test strip   Yes No   Sig: USE AS DIRECTED. USE AS DIRECTED.    Senna-Tabs 8.6 MG tablet   Yes Yes   Sig: TAKE 2 TABLETS EVERY DAY BY ORAL ROUTE.   amLODIPine (NORVASC) 5 mg tablet   Yes No   Sig: TAKE 1 TABLET EVERY DAY BY ORAL ROUTE   atorvastatin (LIPITOR) 80 mg tablet   Yes No   Sig: Take 80 mg by mouth   benzonatate (TESSALON PERLES) 100 mg capsule   No No   Sig: Take 1 capsule (100 mg total) by mouth every 8 (eight) hours   cholecalciferol (VITAMIN D3) 1,000 units tablet   Yes No   Sig: TAKE 1 TABLET(S) EVERY DAY BY ORAL ROUTE FOR 90 DAYS. ciclopirox (PENLAC) 8 % solution   Yes No   Sig: APPLY TO THE AFFECTED AREA(S) BY TOPICAL ROUTE ONCE DAILY PREFERABLY AT BEDTIME OR 8 HOURS BEFORE WASHING   docusate sodium (COLACE) 100 mg capsule   No No   Sig: Take 1 capsule (100 mg total) by mouth every 12 (twelve) hours   donepezil (ARICEPT) 10 mg tablet   Yes Yes   Sig: TAKE ONE TABLET EACH EVENING AT BEDTIME FOR MEMORY   ezetimibe (ZETIA) 10 mg tablet   Yes No   Sig: Take 10 mg by mouth daily   famotidine (PEPCID) 40 MG tablet   Yes No   Sig: TAKE ONE TABLET BY MOUTH DAILY HE 1 TABLETA POR VIA ORAL DIARIAMENTE   hydrochlorothiazide (HYDRODIURIL) 25 mg tablet   Yes No   Sig: Take 25 mg by mouth   levothyroxine 25 mcg tablet   Yes No   Sig: TAKE ONE TABLET BY MOUTH DAILY HE 1 TABLETA POR VIA ORAL DIARIAMENTE   lidocaine (LIDODERM) 5 %   Yes Yes   Sig: APPLY 1 PATCH BY TOPICAL ROUTE ONCE DAILY (MAY WEAR UP TO 12HOURS.)   losartan (COZAAR) 50 mg tablet   Yes No   Sig: TAKE 1 TABLET EVERY DAY BY ORAL ROUTE FOR 90 DAYS. omega-3-acid ethyl esters (LOVAZA) 1 g capsule   Yes No   Sig: TAKE 2 CAPSULE(S) TWICE A DAY BY ORAL ROUTE FOR 90 DAYS. omeprazole (PriLOSEC) 20 mg delayed release capsule   Yes No   Sig: Take 20 mg by mouth daily   sertraline (ZOLOFT) 50 mg tablet   Yes No   Sig: TAKE 1 TABLET EVERY DAY BY ORAL ROUTE. Facility-Administered Medications: None       Past Medical History:   Diagnosis Date    Diabetes mellitus ([address])     Heart abnormality     Hyperlipidemia     Hypertension     Renal disorder        Past Surgical History:   Procedure Laterality Date     SECTION         History reviewed. No pertinent family history. I have reviewed and agree with the history as documented.     E-Cigarette/Vaping    E-Cigarette Use Never User      E-Cigarette/Vaping Substances    Nicotine No     THC No     CBD No     Flavoring No     Other No     Unknown No      Social History     Tobacco Use Smoking status: Never    Smokeless tobacco: Never   Vaping Use    Vaping Use: Never used   Substance Use Topics    Alcohol use: Never    Drug use: Never       Review of Systems   Constitutional:  Negative for chills, diaphoresis, fever and unexpected weight change. HENT:  Negative for ear pain and sore throat. Eyes:  Negative for visual disturbance. Respiratory:  Negative for cough, chest tightness and shortness of breath. Cardiovascular:  Negative for chest pain and leg swelling. Gastrointestinal:  Negative for abdominal distention, abdominal pain, constipation, diarrhea, nausea and vomiting. Endocrine: Negative. Genitourinary:  Negative for difficulty urinating and dysuria. Musculoskeletal:  Positive for back pain. Skin: Negative. Allergic/Immunologic: Negative. Neurological: Negative. Hematological: Negative. Psychiatric/Behavioral: Negative. All other systems reviewed and are negative. Physical Exam  Physical Exam  Vitals and nursing note reviewed. Constitutional:       General: She is not in acute distress. Appearance: Normal appearance. She is not ill-appearing. HENT:      Head: Normocephalic and atraumatic. Right Ear: External ear normal.      Left Ear: External ear normal.      Nose: Nose normal.      Mouth/Throat:      Mouth: Mucous membranes are moist.      Pharynx: Oropharynx is clear. Eyes:      General: No scleral icterus. Right eye: No discharge. Left eye: No discharge. Extraocular Movements: Extraocular movements intact. Conjunctiva/sclera: Conjunctivae normal.      Pupils: Pupils are equal, round, and reactive to light. Cardiovascular:      Rate and Rhythm: Normal rate and regular rhythm. Pulses: Normal pulses. Heart sounds: Normal heart sounds. Pulmonary:      Effort: Pulmonary effort is normal.      Breath sounds: Normal breath sounds. Abdominal:      General: Abdomen is flat.  Bowel sounds are normal. There is no distension. Palpations: Abdomen is soft. Tenderness: There is no abdominal tenderness. There is no guarding or rebound. Musculoskeletal:         General: Tenderness (Bilateral lower back pain) present. Normal range of motion. Cervical back: Normal range of motion and neck supple. Skin:     General: Skin is warm and dry. Capillary Refill: Capillary refill takes less than 2 seconds. Neurological:      General: No focal deficit present. Mental Status: She is alert and oriented to person, place, and time. Mental status is at baseline. Psychiatric:         Mood and Affect: Mood normal.         Behavior: Behavior normal.         Thought Content: Thought content normal.         Judgment: Judgment normal.         Vital Signs  ED Triage Vitals   Temperature Pulse Respirations Blood Pressure SpO2   11/26/23 1142 11/26/23 1142 11/26/23 1142 11/26/23 1142 11/26/23 1142   97.7 °F (36.5 °C) 56 18 128/78 95 %      Temp Source Heart Rate Source Patient Position - Orthostatic VS BP Location FiO2 (%)   11/26/23 1142 11/26/23 1142 11/26/23 1142 11/26/23 1142 --   Tympanic Monitor Sitting Left arm       Pain Score       11/26/23 1140       10 - Worst Possible Pain           Vitals:    11/26/23 1142   BP: 128/78   Pulse: 56   Patient Position - Orthostatic VS: Sitting         Visual Acuity      ED Medications  Medications   lidocaine (LIDODERM) 5 % patch 2 patch (2 patches Topical Medication Applied 11/26/23 1200)   acetaminophen (TYLENOL) tablet 975 mg (975 mg Oral Given 11/26/23 1201)       Diagnostic Studies  Results Reviewed       None                   No orders to display              Procedures  Procedures         ED Course                               SBIRT 22yo+      Flowsheet Row Most Recent Value   Initial Alcohol Screen: US AUDIT-C     1. How often do you have a drink containing alcohol? 0 Filed at: 11/26/2023 1133   2.  How many drinks containing alcohol do you have on a typical day you are drinking? 0 Filed at: 11/26/2023 1139   3a. Male UNDER 65: How often do you have five or more drinks on one occasion? 0 Filed at: 11/26/2023 1139   3b. FEMALE Any Age, or MALE 65+: How often do you have 4 or more drinks on one occassion? 0 Filed at: 11/26/2023 1139   Audit-C Score 0 Filed at: 11/26/2023 1139   ALICIA: How many times in the past year have you. .. Used an illegal drug or used a prescription medication for non-medical reasons? Never Filed at: 11/26/2023 1139                      Medical Decision Making  82-year-old female presenting with acute on chronic low back pain with addition of diffuse back pain  On examination patient does not have any significant tenderness. However does endorse pain in the bilateral lower back. Patient without any neurologic deficits concerning for cauda equina, epidural mass including abscess or hematoma  Most likely suffering from muscle pain. We will administer Tylenol as well as Lidoderm patch and reassess  Patient states that her pain is resolved   Discharged with return precaution provided     Problems Addressed:  Acute exacerbation of chronic low back pain: acute illness or injury  Generalized body aches: acute illness or injury    Risk  OTC drugs. Prescription drug management. Disposition  Final diagnoses:   Acute exacerbation of chronic low back pain   Generalized body aches     Time reflects when diagnosis was documented in both MDM as applicable and the Disposition within this note       Time User Action Codes Description Comment    11/26/2023 12:35 PM Angelica Avelar Add [M54.50,  G89.29] Acute exacerbation of chronic low back pain     11/26/2023 12:36 PM Angelica Avelar Add [R52] Generalized body aches           ED Disposition       ED Disposition   Discharge    Condition   Stable    Date/Time   Sun Nov 26, 2023 [address] discharge to home/self care.                    Follow-up Information       Follow up With Specialties Details Why Ben Martinez MD Family Medicine Schedule an appointment as soon as possible for a visit   [phone]. [address] [place] [address]  [phone]              Discharge Medication List as of 11/26/2023 12:36 PM        START taking these medications    Details   !! lidocaine (Lidoderm) 5 % Apply 1 patch topically over 12 hours daily Remove & Discard patch within 12 hours or as directed by MD, Starting Sun 11/26/2023, Normal       !! - Potential duplicate medications found. Please discuss with provider. CONTINUE these medications which have NOT CHANGED    Details   donepezil (ARICEPT) 10 mg tablet TAKE ONE TABLET EACH EVENING AT BEDTIME FOR MEMORY, Historical Med      !! lidocaine (LIDODERM) 5 % APPLY 1 PATCH BY TOPICAL ROUTE ONCE DAILY (MAY WEAR UP TO 12HOURS.), Historical Med      Senna-Tabs 8.6 MG tablet TAKE 2 TABLETS EVERY DAY BY ORAL ROUTE., Historical Med      amLODIPine (NORVASC) 5 mg tablet TAKE 1 TABLET EVERY DAY BY ORAL ROUTE, Historical Med      Aspirin 81 MG CAPS Take 81 mg by mouth daily, Historical Med      atorvastatin (LIPITOR) 80 mg tablet Take 80 mg by mouth, Historical Med      benzonatate (TESSALON PERLES) 100 mg capsule Take 1 capsule (100 mg total) by mouth every 8 (eight) hours, Starting Fri 5/19/2023, Normal      cholecalciferol (VITAMIN D3) 1,000 units tablet TAKE 1 TABLET(S) EVERY DAY BY ORAL ROUTE FOR 90 DAYS., Historical Med      ciclopirox (PENLAC) 8 % solution APPLY TO THE AFFECTED AREA(S) BY TOPICAL ROUTE ONCE DAILY PREFERABLY AT BEDTIME OR 8 HOURS BEFORE WASHING, Historical Med      Diclofenac Sodium (VOLTAREN) 1 % Apply to left shoulder region 4 times aday. , Print      docusate sodium (COLACE) 100 mg capsule Take 1 capsule (100 mg total) by mouth every 12 (twelve) hours, Starting Sun 7/3/2022, Normal      ezetimibe (ZETIA) 10 mg tablet Take 10 mg by mouth daily, Historical Med      famotidine (PEPCID) 40 MG tablet TAKE ONE TABLET BY MOUTH DAILY HE 1 TABLETA POR VIA ORAL DIARIAMENTE, Historical Med      Global Inject Ease Lancets 30G MISC TEST 2-3 TIMES A DAY AS DIRECTED TEST 2-3 TIMES A DAY AS DIRECTED, Historical Med      GNP Alcohol Swabs 70 % PADS TEST 2-3 TIMES A DAY AS DIRECTED TEST 2-3 TIMES A DAY AS DIRECTED, Historical Med      hydrochlorothiazide (HYDRODIURIL) 25 mg tablet Take 25 mg by mouth, Historical Med      levothyroxine 25 mcg tablet TAKE ONE TABLET BY MOUTH DAILY HE 1 TABLETA POR VIA ORAL DIARIAMENTE, Historical Med      losartan (COZAAR) 50 mg tablet TAKE 1 TABLET EVERY DAY BY ORAL ROUTE FOR 90 DAYS., Historical Med      omega-3-acid ethyl esters (LOVAZA) 1 g capsule TAKE 2 CAPSULE(S) TWICE A DAY BY ORAL ROUTE FOR 90 DAYS., Historical Med      omeprazole (PriLOSEC) 20 mg delayed release capsule Take 20 mg by mouth daily, Starting Tue 1/24/2023, Historical Med      OneTouch Ultra test strip USE AS DIRECTED. USE AS DIRECTED., Historical Med      sertraline (ZOLOFT) 50 mg tablet TAKE 1 TABLET EVERY DAY BY ORAL ROUTE., Historical Med       !! - Potential duplicate medications found. Please discuss with provider. No discharge procedures on file.     PDMP Review       None            ED Provider  Electronically Signed by             Kalpana Randolph MD  11/26/23 2997

## 2024-01-23 ENCOUNTER — APPOINTMENT (OUTPATIENT)
Dept: LAB | Facility: HOSPITAL | Age: 84
End: 2024-01-23
Payer: COMMERCIAL

## 2024-01-23 DIAGNOSIS — N18.32 CHRONIC KIDNEY DISEASE (CKD) STAGE G3B/A1, MODERATELY DECREASED GLOMERULAR FILTRATION RATE (GFR) BETWEEN 30-44 ML/MIN/1.73 SQUARE METER AND ALBUMINURIA CREATININE RATIO LESS THAN 30 MG/G (HCC): Primary | ICD-10-CM

## 2024-01-23 DIAGNOSIS — E55.9 AVITAMINOSIS D: ICD-10-CM

## 2024-01-23 DIAGNOSIS — I10 ESSENTIAL HYPERTENSION, MALIGNANT: ICD-10-CM

## 2024-01-23 LAB
ALBUMIN SERPL BCP-MCNC: 4.3 G/DL (ref 3.5–5)
ANION GAP SERPL CALCULATED.3IONS-SCNC: 9 MMOL/L
BACTERIA UR QL AUTO: ABNORMAL /HPF
BILIRUB UR QL STRIP: NEGATIVE
BUN SERPL-MCNC: 23 MG/DL (ref 5–25)
CALCIUM SERPL-MCNC: 9.6 MG/DL (ref 8.4–10.2)
CHLORIDE SERPL-SCNC: 101 MMOL/L (ref 96–108)
CLARITY UR: CLEAR
CO2 SERPL-SCNC: 29 MMOL/L (ref 21–32)
COLOR UR: ABNORMAL
CREAT SERPL-MCNC: 1.45 MG/DL (ref 0.6–1.3)
GFR SERPL CREATININE-BSD FRML MDRD: 33 ML/MIN/1.73SQ M
GLUCOSE P FAST SERPL-MCNC: 142 MG/DL (ref 65–99)
GLUCOSE UR STRIP-MCNC: NEGATIVE MG/DL
HCT VFR BLD AUTO: 36.6 % (ref 34.8–46.1)
HGB BLD-MCNC: 11.8 G/DL (ref 11.5–15.4)
HGB UR QL STRIP.AUTO: NEGATIVE
HYALINE CASTS #/AREA URNS LPF: ABNORMAL /LPF
KETONES UR STRIP-MCNC: NEGATIVE MG/DL
LEUKOCYTE ESTERASE UR QL STRIP: 25
MUCOUS THREADS UR QL AUTO: ABNORMAL
NITRITE UR QL STRIP: NEGATIVE
NON-SQ EPI CELLS URNS QL MICRO: ABNORMAL /HPF
PH UR STRIP.AUTO: 5 [PH]
PHOSPHATE SERPL-MCNC: 4.5 MG/DL (ref 2.3–4.1)
POTASSIUM SERPL-SCNC: 4.5 MMOL/L (ref 3.5–5.3)
PROT UR STRIP-MCNC: ABNORMAL MG/DL
PTH-INTACT SERPL-MCNC: 71.4 PG/ML (ref 12–88)
RBC #/AREA URNS AUTO: ABNORMAL /HPF
SODIUM SERPL-SCNC: 139 MMOL/L (ref 135–147)
SP GR UR STRIP.AUTO: 1.01 (ref 1–1.04)
UROBILINOGEN UA: NEGATIVE MG/DL
WBC #/AREA URNS AUTO: ABNORMAL /HPF

## 2024-01-23 PROCEDURE — 80069 RENAL FUNCTION PANEL: CPT

## 2024-01-23 PROCEDURE — 36415 COLL VENOUS BLD VENIPUNCTURE: CPT

## 2024-01-23 PROCEDURE — 85018 HEMOGLOBIN: CPT

## 2024-01-23 PROCEDURE — 81001 URINALYSIS AUTO W/SCOPE: CPT

## 2024-01-23 PROCEDURE — 85303 CLOT INHIBIT PROT C ACTIVITY: CPT

## 2024-01-23 PROCEDURE — 85014 HEMATOCRIT: CPT

## 2024-01-23 PROCEDURE — 85306 CLOT INHIBIT PROT S FREE: CPT

## 2024-01-23 PROCEDURE — 83970 ASSAY OF PARATHORMONE: CPT

## 2024-01-25 LAB
PROT C AG ACT/NOR PPP IA: 115 % OF NORMAL (ref 60–150)
PROT S ACT/NOR PPP: 71 % (ref 68–108)

## 2024-04-16 PROBLEM — E27.8 NODULAR ADRENAL CORTEX (HCC): Status: ACTIVE | Noted: 2023-05-30

## 2024-04-16 PROBLEM — D63.1 ANEMIA IN CHRONIC KIDNEY DISEASE: Chronic | Status: ACTIVE | Noted: 2021-07-28

## 2024-04-16 PROBLEM — E27.9 NODULAR ADRENAL CORTEX (HCC): Status: ACTIVE | Noted: 2023-05-30

## 2024-04-16 PROBLEM — N18.9 ANEMIA IN CHRONIC KIDNEY DISEASE: Chronic | Status: ACTIVE | Noted: 2021-07-28

## 2024-04-16 PROBLEM — G47.9 SLEEP DISTURBANCE: Status: ACTIVE | Noted: 2024-04-03

## 2024-04-16 PROBLEM — R41.3 MEMORY LOSS: Status: ACTIVE | Noted: 2024-04-03

## 2024-04-16 PROBLEM — I12.9 BENIGN HYPERTENSIVE KIDNEY DISEASE: Status: ACTIVE | Noted: 2020-03-04

## 2024-04-16 PROBLEM — R07.2 PRECORDIAL PAIN: Status: ACTIVE | Noted: 2020-03-26

## 2024-04-16 PROBLEM — E55.9 VITAMIN D DEFICIENCY: Status: ACTIVE | Noted: 2021-07-28

## 2024-04-17 ENCOUNTER — OFFICE VISIT (OUTPATIENT)
Dept: DENTISTRY | Facility: CLINIC | Age: 84
End: 2024-04-17

## 2024-04-17 VITALS — TEMPERATURE: 97.8 F | DIASTOLIC BLOOD PRESSURE: 76 MMHG | HEART RATE: 69 BPM | SYSTOLIC BLOOD PRESSURE: 155 MMHG

## 2024-04-17 DIAGNOSIS — Z01.20 ENCOUNTER FOR DENTAL EXAMINATION: Primary | ICD-10-CM

## 2024-04-17 PROCEDURE — D0140 LIMITED ORAL EVALUATION - PROBLEM FOCUSED: HCPCS

## 2024-04-17 PROCEDURE — D0274 BITEWINGS - 4 RADIOGRAPHIC IMAGES: HCPCS

## 2024-04-17 PROCEDURE — D0330 PANORAMIC RADIOGRAPHIC IMAGE: HCPCS

## 2024-04-17 NOTE — PROGRESS NOTES
"Dental procedures in this visit     - LIMITED ORAL EVALUATION - PROBLEM FOCUSED (Completed)     Service provider: Scooby Reich DMD     Billing provider: Scooby Reich DMD     - PANORAMIC RADIOGRAPHIC IMAGE (Completed)     Service provider: Scooby Reich DMD     Billing provider: Scooby Reich DMD     - BITEWINGS - 4 RADIOGRAPHIC IMAGES (Completed)     Service provider: Scooby Reich DMD     Billing provider: Scooby Reich DMD     Subjective   Patient ID: Alejandrina Severino is a 84 y.o. female.  Chief Complaint   Patient presents with    Comprehensive Exam     HPI  The following portions of the chart were reviewed this encounter and updated as appropriate:    No text in SmartText         \"My tooth on the upper left is bothersome\"    Objective   Soft Tissue Exam  No findings documented this visit      Dental Exam    Radiographic Interpretation:   Associated radiographs for today's visit were reviewed and finding(s) were discussed with the patient.   Findings include: 4 bitewing x-rays, PAN  Hard Tissue Exam:  #16 periodontally hopeless  Reference tooth chart for additional findings.    Assessment/Plan   Problem List Items Addressed This Visit    None  Visit Diagnoses       Encounter for dental examination    -  Primary    Relevant Orders    BITEWINGS - 4 RADIOGRAPHIC IMAGES (Completed)    LIMITED ORAL EVALUATION - PROBLEM FOCUSED (Completed)    PANORAMIC RADIOGRAPHIC IMAGE (Completed)        83 yo female presents with upper left molar that is bothersome. Upon clinical and radiographic exam, #16 mobile, periodontal hopeless and warrants extraction. After extraction of #16 a comprehensive exam will be performed.    NV: extraction #16, then comprehensive exam  "

## 2024-05-04 ENCOUNTER — APPOINTMENT (OUTPATIENT)
Dept: LAB | Facility: HOSPITAL | Age: 84
End: 2024-05-04
Payer: COMMERCIAL

## 2024-05-04 DIAGNOSIS — E11.9 TYPE 2 DIABETES MELLITUS WITHOUT COMPLICATION, UNSPECIFIED WHETHER LONG TERM INSULIN USE (HCC): ICD-10-CM

## 2024-05-04 LAB
ALBUMIN SERPL BCP-MCNC: 4.2 G/DL (ref 3.5–5)
ALP SERPL-CCNC: 92 U/L (ref 34–104)
ALT SERPL W P-5'-P-CCNC: 27 U/L (ref 7–52)
ANION GAP SERPL CALCULATED.3IONS-SCNC: 9 MMOL/L (ref 4–13)
AST SERPL W P-5'-P-CCNC: 27 U/L (ref 13–39)
BASOPHILS # BLD AUTO: 0.06 THOUSANDS/ÂΜL (ref 0–0.1)
BASOPHILS NFR BLD AUTO: 1 % (ref 0–1)
BILIRUB SERPL-MCNC: 0.4 MG/DL (ref 0.2–1)
BUN SERPL-MCNC: 32 MG/DL (ref 5–25)
CALCIUM SERPL-MCNC: 9.4 MG/DL (ref 8.4–10.2)
CHLORIDE SERPL-SCNC: 104 MMOL/L (ref 96–108)
CHOLEST SERPL-MCNC: 266 MG/DL
CO2 SERPL-SCNC: 25 MMOL/L (ref 21–32)
CREAT SERPL-MCNC: 1.46 MG/DL (ref 0.6–1.3)
EOSINOPHIL # BLD AUTO: 0.35 THOUSAND/ÂΜL (ref 0–0.61)
EOSINOPHIL NFR BLD AUTO: 4 % (ref 0–6)
ERYTHROCYTE [DISTWIDTH] IN BLOOD BY AUTOMATED COUNT: 13.7 % (ref 11.6–15.1)
EST. AVERAGE GLUCOSE BLD GHB EST-MCNC: 192 MG/DL
GFR SERPL CREATININE-BSD FRML MDRD: 33 ML/MIN/1.73SQ M
GLUCOSE P FAST SERPL-MCNC: 145 MG/DL (ref 65–99)
HBA1C MFR BLD: 8.3 %
HCT VFR BLD AUTO: 37.8 % (ref 34.8–46.1)
HDLC SERPL-MCNC: 37 MG/DL
HGB BLD-MCNC: 11.3 G/DL (ref 11.5–15.4)
IMM GRANULOCYTES # BLD AUTO: 0.03 THOUSAND/UL (ref 0–0.2)
IMM GRANULOCYTES NFR BLD AUTO: 0 % (ref 0–2)
LDLC SERPL CALC-MCNC: 175 MG/DL (ref 0–100)
LYMPHOCYTES # BLD AUTO: 4.21 THOUSANDS/ÂΜL (ref 0.6–4.47)
LYMPHOCYTES NFR BLD AUTO: 45 % (ref 14–44)
MCH RBC QN AUTO: 30.1 PG (ref 26.8–34.3)
MCHC RBC AUTO-ENTMCNC: 29.9 G/DL (ref 31.4–37.4)
MCV RBC AUTO: 101 FL (ref 82–98)
MONOCYTES # BLD AUTO: 0.55 THOUSAND/ÂΜL (ref 0.17–1.22)
MONOCYTES NFR BLD AUTO: 6 % (ref 4–12)
NEUTROPHILS # BLD AUTO: 4.19 THOUSANDS/ÂΜL (ref 1.85–7.62)
NEUTS SEG NFR BLD AUTO: 44 % (ref 43–75)
NONHDLC SERPL-MCNC: 229 MG/DL
NRBC BLD AUTO-RTO: 0 /100 WBCS
PLATELET # BLD AUTO: 256 THOUSANDS/UL (ref 149–390)
PMV BLD AUTO: 9.3 FL (ref 8.9–12.7)
POTASSIUM SERPL-SCNC: 5.3 MMOL/L (ref 3.5–5.3)
PROT SERPL-MCNC: 7.4 G/DL (ref 6.4–8.4)
RBC # BLD AUTO: 3.76 MILLION/UL (ref 3.81–5.12)
SODIUM SERPL-SCNC: 138 MMOL/L (ref 135–147)
TRIGL SERPL-MCNC: 271 MG/DL
WBC # BLD AUTO: 9.39 THOUSAND/UL (ref 4.31–10.16)

## 2024-05-04 PROCEDURE — 80061 LIPID PANEL: CPT

## 2024-05-04 PROCEDURE — 83036 HEMOGLOBIN GLYCOSYLATED A1C: CPT

## 2024-05-04 PROCEDURE — 36415 COLL VENOUS BLD VENIPUNCTURE: CPT

## 2024-05-04 PROCEDURE — 85025 COMPLETE CBC W/AUTO DIFF WBC: CPT

## 2024-05-04 PROCEDURE — 80053 COMPREHEN METABOLIC PANEL: CPT

## 2024-05-06 ENCOUNTER — OFFICE VISIT (OUTPATIENT)
Dept: DENTISTRY | Facility: CLINIC | Age: 84
End: 2024-05-06

## 2024-05-06 VITALS — HEART RATE: 73 BPM | TEMPERATURE: 96.6 F | DIASTOLIC BLOOD PRESSURE: 74 MMHG | SYSTOLIC BLOOD PRESSURE: 139 MMHG

## 2024-05-06 DIAGNOSIS — K05.6 PERIODONTAL DISEASE: Primary | ICD-10-CM

## 2024-05-06 PROCEDURE — D7140 EXTRACTION, ERUPTED TOOTH OR EXPOSED ROOT (ELEVATION AND/OR FORCEPS REMOVAL): HCPCS

## 2024-05-06 RX ORDER — ACETAMINOPHEN 500 MG
1000 TABLET ORAL EVERY 6 HOURS PRN
Qty: 28 TABLET | Refills: 0 | Status: SHIPPED | OUTPATIENT
Start: 2024-05-06 | End: 2024-05-13

## 2024-05-07 ENCOUNTER — TELEPHONE (OUTPATIENT)
Age: 84
End: 2024-05-07

## 2024-05-07 NOTE — TELEPHONE ENCOUNTER
Patient called for an appointment for back pain. Advised that this is ENT and she wants Orthopedics. Provided their phone number.

## 2024-05-08 NOTE — DENTAL PROCEDURE DETAILS
Oral Surgery    Alejandrina Severino presents for Ext #16    RPMH, patient denies any changes. Obtained a direct and personal consent. Risks and complications were explained. Pt agreed and consented. Consent scanned in doc center.    Pre-Op BP WNL.     Administered 1.5 cc of 4% articaine w/ 1:100,000 epi via infiltration. ?  Adequate anesthesia obtained, reflected gingiva, elevated, and extracted #16 . Socket irrigated    Upon dismissal, patient received POI, ice, gauze, and RX: acetaminophen     NV: comp exam if interested

## 2024-06-21 RX ORDER — FLUCONAZOLE 150 MG/1
TABLET ORAL
COMMUNITY
Start: 2024-05-02

## 2024-06-24 ENCOUNTER — OFFICE VISIT (OUTPATIENT)
Dept: DENTISTRY | Facility: CLINIC | Age: 84
End: 2024-06-24

## 2024-06-24 VITALS — SYSTOLIC BLOOD PRESSURE: 167 MMHG | TEMPERATURE: 97.8 F | DIASTOLIC BLOOD PRESSURE: 66 MMHG | HEART RATE: 79 BPM

## 2024-06-24 DIAGNOSIS — K03.6 DENTAL CALCULUS: ICD-10-CM

## 2024-06-24 DIAGNOSIS — K03.6 ACCRETIONS ON TEETH: ICD-10-CM

## 2024-06-24 DIAGNOSIS — Z01.20 ENCOUNTER FOR DENTAL EXAMINATION: Primary | ICD-10-CM

## 2024-06-24 DIAGNOSIS — K02.9 DENTAL CARIES: ICD-10-CM

## 2024-06-24 PROCEDURE — D0150 COMPREHENSIVE ORAL EVALUATION - NEW OR ESTABLISHED PATIENT: HCPCS

## 2024-06-24 NOTE — DENTAL PROCEDURE DETAILS
Shelbyjandrina Severino presents for a Comprehensive exam. Verbal consent for treatment given in addition to the forms.    Translation used during appointment   [id number]     Reviewed health history - Patient is ASA II  Consents signed: Yes     Perio: Normal   stage 2 localized  third molars Grade B  Pain Scale: 0  Caries Assessment: Medium  Radiographs: None     EXAM  DR ALEJO no decay     Recommended Hygiene recall visits with the [place].     Treatment Plan:  1.  Infection control: referred for   2.  Periodontal therapy: adult prophy/   3.  Caries control: as charted  4.  Occlusal evaluation: excessive wear/ occlusal trauma     Prognosis is Good.  Referrals needed: No  Next Visit: initial prophy

## 2024-06-26 ENCOUNTER — OFFICE VISIT (OUTPATIENT)
Dept: DENTISTRY | Facility: CLINIC | Age: 84
End: 2024-06-26

## 2024-06-26 VITALS — HEART RATE: 88 BPM | DIASTOLIC BLOOD PRESSURE: 71 MMHG | TEMPERATURE: 96.3 F | SYSTOLIC BLOOD PRESSURE: 111 MMHG

## 2024-06-26 DIAGNOSIS — Z01.20 ENCOUNTER FOR DENTAL EXAMINATION: Primary | ICD-10-CM

## 2024-06-26 PROCEDURE — D1110 PROPHYLAXIS - ADULT: HCPCS | Performed by: DENTAL HYGIENIST

## 2024-06-26 PROCEDURE — D1330 ORAL HYGIENE INSTRUCTIONS: HCPCS | Performed by: DENTAL HYGIENIST

## 2024-06-26 NOTE — DENTAL PROCEDURE DETAILS
ASA  II  Pain - 0  Reviewed M/DH    Prophylaxis completed with ultrasonic  and hand instrumentation.    ---Lt to mod calc and plaque, mod stain  ---Soft plaque removed and sub /supragingival calculus removed from all teeth.    ---Polished with prophy cup and paste.    ---Flossed and provided Oral Health Instructions.    ---Demonstrated proper brushing and flossing technique.    ---Patient left satisfied and ambulatory.    Exam:  none  Referral:   none    NV1:  6mrc - 50  min   No

## 2024-07-01 ENCOUNTER — TELEPHONE (OUTPATIENT)
Dept: DENTISTRY | Facility: CLINIC | Age: 84
End: 2024-07-01

## 2024-08-14 ENCOUNTER — TELEPHONE (OUTPATIENT)
Dept: DENTISTRY | Facility: CLINIC | Age: 84
End: 2024-08-14

## 2025-01-21 ENCOUNTER — APPOINTMENT (EMERGENCY)
Dept: CT IMAGING | Facility: HOSPITAL | Age: 85
End: 2025-01-21
Payer: MEDICARE

## 2025-01-21 ENCOUNTER — HOSPITAL ENCOUNTER (EMERGENCY)
Facility: HOSPITAL | Age: 85
Discharge: HOME | End: 2025-01-21
Attending: INTERNAL MEDICINE | Admitting: INTERNAL MEDICINE
Payer: MEDICARE

## 2025-01-21 VITALS
OXYGEN SATURATION: 99 % | WEIGHT: 149.25 LBS | TEMPERATURE: 98.4 F | HEART RATE: 76 BPM | RESPIRATION RATE: 20 BRPM | BODY MASS INDEX: 29.15 KG/M2 | DIASTOLIC BLOOD PRESSURE: 98 MMHG | SYSTOLIC BLOOD PRESSURE: 174 MMHG

## 2025-01-21 DIAGNOSIS — N39.0 UTI (URINARY TRACT INFECTION): Primary | ICD-10-CM

## 2025-01-21 LAB
ALBUMIN SERPL BCG-MCNC: 4.3 G/DL (ref 3.5–5)
ALP SERPL-CCNC: 98 U/L (ref 34–104)
ALT SERPL W P-5'-P-CCNC: 14 U/L (ref 7–52)
ANION GAP SERPL CALCULATED.3IONS-SCNC: 10 MMOL/L (ref 4–13)
AST SERPL W P-5'-P-CCNC: 15 U/L (ref 13–39)
BACTERIA UR QL AUTO: ABNORMAL /HPF
BASOPHILS NFR BLD AUTO: 0 % (ref 0–1)
BILIRUB SERPL-MCNC: 0.53 MG/DL (ref 0.2–1)
BILIRUB UR QL STRIP: NEGATIVE
BUN SERPL-MCNC: 32 MG/DL (ref 5–25)
CALCIUM SERPL-MCNC: 10.1 MG/DL (ref 8.4–10.2)
CHLORIDE SERPL-SCNC: 102 MMOL/L (ref 96–108)
CLARITY UR: ABNORMAL
CO2 SERPL-SCNC: 27 MMOL/L (ref 21–32)
COLOR UR: ABNORMAL
CREAT SERPL-MCNC: 1.66 MG/DL (ref 0.6–1.3)
EOSINOPHIL NFR BLD AUTO: 2 % (ref 0–6)
ERYTHROCYTE [DISTWIDTH] IN BLOOD BY AUTOMATED COUNT: 13.1 % (ref 11.6–15.1)
GFR SERPL CREATININE-BSD FRML MDRD: 28 ML/MIN/1.73SQ M
GLUCOSE SERPL-MCNC: 199 MG/DL (ref 65–140)
GLUCOSE UR STRIP-MCNC: NEGATIVE MG/DL
HCT VFR BLD AUTO: 40.1 % (ref 34.8–46.1)
HGB BLD-MCNC: 12.8 G/DL (ref 11.5–15.4)
HGB UR QL STRIP.AUTO: 50
IMM GRANULOCYTES NFR BLD AUTO: 1 % (ref 0–2)
KETONES UR STRIP-MCNC: NEGATIVE MG/DL
LEUKOCYTE ESTERASE UR QL STRIP: 500
LYMPHOCYTES NFR BLD AUTO: 27 % (ref 14–44)
MCH RBC QN AUTO: 30.1 PG (ref 26.8–34.3)
MCHC RBC AUTO-ENTMCNC: 31.9 G/DL (ref 31.4–37.4)
MCV RBC AUTO: 94 FL (ref 82–98)
MONOCYTES NFR BLD AUTO: 7 % (ref 4–12)
MUCOUS THREADS UR QL AUTO: ABNORMAL
NEUTS SEG NFR BLD AUTO: 64 % (ref 43–75)
NITRITE UR QL STRIP: POSITIVE
NON-SQ EPI CELLS URNS QL MICRO: ABNORMAL /HPF
NRBC BLD AUTO-RTO: 0 /100 WBCS
PH UR STRIP.AUTO: 6 [PH]
PLATELET # BLD AUTO: 301 THOUSANDS/UL (ref 149–390)
PMV BLD AUTO: 9.3 FL (ref 8.9–12.7)
POTASSIUM SERPL-SCNC: 4.5 MMOL/L (ref 3.5–5.3)
PROT SERPL-MCNC: 7.9 G/DL (ref 6.4–8.4)
PROT UR STRIP-MCNC: ABNORMAL MG/DL
RBC # BLD AUTO: 4.25 MILLION/UL (ref 3.81–5.12)
RBC #/AREA URNS AUTO: ABNORMAL /HPF
SODIUM SERPL-SCNC: 139 MMOL/L (ref 135–147)
SP GR UR STRIP.AUTO: 1.01 (ref 1–1.04)
UROBILINOGEN UA: NEGATIVE MG/DL
WBC # BLD AUTO: 9.45 THOUSAND/UL (ref 4.31–10.16)
WBC #/AREA URNS AUTO: ABNORMAL /HPF

## 2025-01-21 PROCEDURE — 85025 COMPLETE CBC W/AUTO DIFF WBC: CPT | Performed by: PHYSICIAN ASSISTANT

## 2025-01-21 PROCEDURE — 99284 EMERGENCY DEPT VISIT MOD MDM: CPT | Performed by: PHYSICIAN ASSISTANT

## 2025-01-21 PROCEDURE — 36415 COLL VENOUS BLD VENIPUNCTURE: CPT | Performed by: PHYSICIAN ASSISTANT

## 2025-01-21 PROCEDURE — 80053 COMPREHEN METABOLIC PANEL: CPT | Performed by: PHYSICIAN ASSISTANT

## 2025-01-21 PROCEDURE — 87086 URINE CULTURE/COLONY COUNT: CPT | Performed by: PHYSICIAN ASSISTANT

## 2025-01-21 PROCEDURE — 81001 URINALYSIS AUTO W/SCOPE: CPT | Performed by: PHYSICIAN ASSISTANT

## 2025-01-21 PROCEDURE — 87077 CULTURE AEROBIC IDENTIFY: CPT | Performed by: PHYSICIAN ASSISTANT

## 2025-01-21 PROCEDURE — 74176 CT ABD & PELVIS W/O CONTRAST: CPT

## 2025-01-21 PROCEDURE — 87186 SC STD MICRODIL/AGAR DIL: CPT | Performed by: PHYSICIAN ASSISTANT

## 2025-01-21 PROCEDURE — 96365 THER/PROPH/DIAG IV INF INIT: CPT

## 2025-01-21 PROCEDURE — 99283 EMERGENCY DEPT VISIT LOW MDM: CPT

## 2025-01-21 PROCEDURE — 81003 URINALYSIS AUTO W/O SCOPE: CPT | Performed by: PHYSICIAN ASSISTANT

## 2025-01-21 RX ORDER — CEFTRIAXONE 1 G/50ML
1000 INJECTION, SOLUTION INTRAVENOUS ONCE
Status: COMPLETED | OUTPATIENT
Start: 2025-01-21 | End: 2025-01-21

## 2025-01-21 RX ORDER — ACETAMINOPHEN 325 MG/1
975 TABLET ORAL ONCE
Status: COMPLETED | OUTPATIENT
Start: 2025-01-21 | End: 2025-01-21

## 2025-01-21 RX ORDER — CEPHALEXIN 500 MG/1
500 CAPSULE ORAL EVERY 12 HOURS SCHEDULED
Qty: 14 CAPSULE | Refills: 0 | Status: SHIPPED | OUTPATIENT
Start: 2025-01-21 | End: 2025-01-28

## 2025-01-21 RX ADMIN — ACETAMINOPHEN 975 MG: 325 TABLET, FILM COATED ORAL at 14:23

## 2025-01-21 RX ADMIN — CEFTRIAXONE 1000 MG: 1 INJECTION, SOLUTION INTRAVENOUS at 16:02

## 2025-01-21 NOTE — ED PROVIDER NOTES
Time reflects when diagnosis was documented in both MDM as applicable and the Disposition within this note       Time User Action Codes Description Comment    2025  4:29 PM Gi Hsieh Add [N39.0] UTI (urinary tract infection)           ED Disposition       ED Disposition   Discharge    Condition   Stable    Date/Time     4:29 PM    Comment   Shelby Severino discharge to home/self care.                   Assessment & Plan       Medical Decision Making  Differential diagnosis includes but not limited to: Musculoskeletal back pain, pyelonephritis, urinary tract infection, other intra-abdominal infection, kidney stone.    Problems Addressed:  UTI (urinary tract infection): acute illness or injury    Amount and/or Complexity of Data Reviewed  Labs: ordered. Decision-making details documented in ED Course.  Radiology: ordered. Decision-making details documented in ED Course.    Risk  OTC drugs.  Prescription drug management.             Medications   acetaminophen (TYLENOL) tablet 975 mg (975 mg Oral Given 25 1423)   cefTRIAXone (ROCEPHIN) IVPB (premix in dextrose) 1,000 mg 50 mL (0 mg Intravenous Stopped 25 1635)       ED Risk Strat Scores                                              History of Present Illness       Chief Complaint   Patient presents with    Back Pain     Rt sided lower back pain for 2 months       Past Medical History:   Diagnosis Date    Diabetes mellitus (HCC)     Heart abnormality     Hyperlipidemia     Hypertension     Renal disorder       Past Surgical History:   Procedure Laterality Date     SECTION        History reviewed. No pertinent family history.   Social History     Tobacco Use    Smoking status: Never    Smokeless tobacco: Never   Vaping Use    Vaping status: Never Used   Substance Use Topics    Alcohol use: Never    Drug use: Never      E-Cigarette/Vaping    E-Cigarette Use Never User       E-Cigarette/Vaping Substances    Nicotine No      THC No     CBD No     Flavoring No     Other No     Unknown No       I have reviewed and agree with the history as documented.     84-year-old female presents to the emergency department with complaints of back pain.  Stated she has had issues with ongoing lower back pain over the past several months.  Taking her routine medications as prescribed.  Denies recent falls or injury.  Notes that she has also been experiencing some urinary symptoms.  Complains of lower abdominal pain and pressure and frequency and burning with urination.  Denies new flank pain, fevers, or chills.  No history of recurrent urinary tract infections.      History provided by:  Patient   used: Yes ([id number])    Back Pain  Associated symptoms: abdominal pain and dysuria    Associated symptoms: no chest pain, no fever, no headaches, no numbness and no weakness        Review of Systems   Constitutional:  Negative for activity change, chills and fever.   HENT:  Negative for congestion, ear pain and sore throat.    Eyes:  Negative for pain.   Respiratory:  Negative for cough, chest tightness, shortness of breath and wheezing.    Cardiovascular:  Negative for chest pain.   Gastrointestinal:  Positive for abdominal pain. Negative for constipation, nausea and vomiting.   Endocrine: Negative for cold intolerance and heat intolerance.   Genitourinary:  Positive for dysuria and frequency. Negative for difficulty urinating, flank pain, hematuria and urgency.        No hematuria   Musculoskeletal:  Positive for back pain. Negative for myalgias.   Skin:  Negative for color change and rash.   Allergic/Immunologic: Negative for food allergies.   Neurological:  Negative for dizziness, syncope, weakness, numbness and headaches.   Psychiatric/Behavioral:  Negative for agitation and behavioral problems.    All other systems reviewed and are negative.          Objective       ED Triage Vitals   Temperature Pulse Blood Pressure Respirations SpO2  Patient Position - Orthostatic VS   01/21/25 1417 01/21/25 1152 01/21/25 1152 01/21/25 1152 01/21/25 1152 01/21/25 1152   98.4 °F (36.9 °C) 77 (!) 192/99 14 99 % Lying      Temp Source Heart Rate Source BP Location FiO2 (%) Pain Score    01/21/25 1417 01/21/25 1152 01/21/25 1152 -- 01/21/25 1658    Oral Monitor Right arm  7      Vitals      Date and Time Temp Pulse SpO2 Resp BP Pain Score FACES Pain Rating User   01/21/25 1658 -- -- -- -- -- 7 -- LB   01/21/25 1417 98.4 °F (36.9 °C) 76 99 % 20 174/98 -- -- JA   01/21/25 1152 -- 77 99 % 14 192/99 -- -- JW            Physical Exam  Vitals and nursing note reviewed.   Constitutional:       General: She is not in acute distress.     Appearance: She is not diaphoretic.   HENT:      Head: Normocephalic and atraumatic.      Right Ear: External ear normal.      Left Ear: External ear normal.      Mouth/Throat:      Pharynx: No oropharyngeal exudate.   Eyes:      Conjunctiva/sclera: Conjunctivae normal.   Neck:      Vascular: No JVD.      Trachea: No tracheal deviation.   Cardiovascular:      Rate and Rhythm: Normal rate and regular rhythm.      Heart sounds: Normal heart sounds. No murmur heard.     No friction rub. No gallop.   Pulmonary:      Effort: Pulmonary effort is normal. No respiratory distress.      Breath sounds: Normal breath sounds. No wheezing or rales.   Chest:      Chest wall: No tenderness.   Abdominal:      General: Bowel sounds are normal. There is no distension.      Palpations: Abdomen is soft.      Tenderness: There is abdominal tenderness in the suprapubic area. There is no guarding.   Musculoskeletal:         General: No tenderness or deformity. Normal range of motion.   Lymphadenopathy:      Cervical: No cervical adenopathy.   Skin:     General: Skin is warm and dry.      Findings: No erythema or rash.   Neurological:      Mental Status: She is alert and oriented to person, place, and time.   Psychiatric:         Mood and Affect: Mood normal.          Behavior: Behavior normal.         Results Reviewed       Procedure Component Value Units Date/Time    Urine Microscopic [[id number]]  (Abnormal) Collected: 01/21/25 1332    Lab Status: Final result Specimen: Urine, Clean Catch Updated: 01/21/25 1351     RBC, UA 4-10 /hpf      WBC, UA Innumerable /hpf      Epithelial Cells Occasional /hpf      Bacteria, UA Moderate /hpf      MUCUS THREADS Occasional    Urine culture [[id number]] Collected: 01/21/25 1332    Lab Status: In process Specimen: Urine, Clean Catch Updated: 01/21/25 1351    UA w Reflex to Microscopic w Reflex to Culture [[id number]]  (Abnormal) Collected: 01/21/25 1332    Lab Status: Final result Specimen: Urine, Clean Catch Updated: 01/21/25 1351     Color, UA Genny     Clarity, UA Slightly Cloudy     Specific Gravity, UA 1.015     pH, UA 6.0     Leukocytes, .0     Nitrite, UA Positive     Protein, UA 30 (1+) mg/dl      Glucose, UA Negative mg/dl      Ketones, UA Negative mg/dl      Bilirubin, UA Negative     Occult Blood, UA 50.0     UROBILINOGEN UA Negative mg/dL     Comprehensive metabolic panel [[id number]]  (Abnormal) Collected: 01/21/25 1239    Lab Status: Final result Specimen: Blood from Arm, Left Updated: 01/21/25 1326     Sodium 139 mmol/L      Potassium 4.5 mmol/L      Chloride 102 mmol/L      CO2 27 mmol/L      ANION GAP 10 mmol/L      BUN 32 mg/dL      Creatinine 1.66 mg/dL      Glucose 199 mg/dL      Calcium 10.1 mg/dL      AST 15 U/L      ALT 14 U/L      Alkaline Phosphatase 98 U/L      Total Protein 7.9 g/dL      Albumin 4.3 g/dL      Total Bilirubin 0.53 mg/dL      eGFR 28 ml/min/1.73sq m     Narrative:      [employer] guidelines for Chronic Kidney Disease (CKD):     Stage 1 with normal or high GFR (GFR > 90 mL/min/1.73 square meters)    Stage 2 Mild CKD (GFR = 60-89 mL/min/1.73 square meters)    Stage 3A Moderate CKD (GFR = 45-59 mL/min/1.73 square meters)    Stage 3B Moderate CKD (GFR = 30-44 mL/min/1.73 square  meters)    Stage 4 Severe CKD (GFR = 15-29 mL/min/1.73 square meters)    Stage 5 End Stage CKD (GFR <15 mL/min/1.73 square meters)  Note: GFR calculation is accurate only with a steady state creatinine    CBC and differential [[id number]] Collected: 01/21/25 1239    Lab Status: Final result Specimen: Blood from Arm, Left Updated: 01/21/25 1251     WBC 9.45 Thousand/uL      RBC 4.25 Million/uL      Hemoglobin 12.8 g/dL      Hematocrit 40.1 %      MCV 94 fL      MCH 30.1 pg      MCHC 31.9 g/dL      RDW 13.1 %      MPV 9.3 fL      Platelets 301 Thousands/uL      nRBC 0 /100 WBCs      Segmented % 64 %      Immature Grans % 1 %      Lymphocytes % 27 %      Monocytes % 7 %      Eosinophils Relative 2 %      Basophils Relative 0 %             CT renal stone study abdomen pelvis without contrast   Final Interpretation by Jadon Rolle MD (01/21 5214)      No acute abdominopelvic pathology.         Workstation performed: [id number]             Procedures    ED Medication and Procedure Management   Prior to Admission Medications   Prescriptions Last Dose Informant Patient Reported? Taking?   Aspirin 81 MG CAPS   Yes No   Sig: Take 81 mg by mouth daily   Patient not taking: Reported on 3/11/2023   Blood Pressure Monitoring ([place] [facility] BP Monitor/Arm) TARIK   Yes No   Sig: USE AS DIRECTED. USE AS DIRECTED.   Diclofenac Sodium (VOLTAREN) 1 %   No No   Sig: Apply to left shoulder region 4 times aday.   GNP Alcohol Swabs 70 % PADS   Yes No   Sig: TEST 2-3 TIMES A DAY AS DIRECTED TEST 2-3 TIMES A DAY AS DIRECTED   Global Inject Ease Lancets 30G MISC   Yes No   Sig: TEST 2-3 TIMES A DAY AS DIRECTED TEST 2-3 TIMES A DAY AS DIRECTED   OneTouch Ultra test strip   Yes No   Sig: USE AS DIRECTED. USE AS DIRECTED.   Senna-Tabs 8.6 MG tablet   Yes No   Sig: TAKE 2 TABLETS EVERY DAY BY ORAL ROUTE.   amLODIPine (NORVASC) 5 mg tablet   Yes No   Sig: TAKE 1 TABLET EVERY DAY BY ORAL ROUTE   atorvastatin (LIPITOR) 80 mg tablet   Yes No   Sig:  Take 80 mg by mouth   benzonatate (TESSALON PERLES) 100 mg capsule   No No   Sig: Take 1 capsule (100 mg total) by mouth every 8 (eight) hours   cholecalciferol (VITAMIN D3) 1,000 units tablet   Yes No   Sig: TAKE 1 TABLET(S) EVERY DAY BY ORAL ROUTE FOR 90 DAYS.   ciclopirox (PENLAC) 8 % solution   Yes No   Sig: APPLY TO THE AFFECTED AREA(S) BY TOPICAL ROUTE ONCE DAILY PREFERABLY AT BEDTIME OR 8 HOURS BEFORE WASHING   docusate sodium (COLACE) 100 mg capsule   No No   Sig: Take 1 capsule (100 mg total) by mouth every 12 (twelve) hours   donepezil (ARICEPT) 10 mg tablet   Yes No   Sig: TAKE ONE TABLET EACH EVENING AT BEDTIME FOR MEMORY   ezetimibe (ZETIA) 10 mg tablet   Yes No   Sig: Take 10 mg by mouth daily   famotidine (PEPCID) 40 MG tablet   Yes No   Sig: TAKE ONE TABLET BY MOUTH DAILY HE 1 TABLETA POR VIA ORAL DIARIAMENTE   fluconazole (DIFLUCAN) 150 mg tablet   Yes No   Sig: TAKE 1 TABLET BY MOUTH TOME 1 TABLET BY MOUTH   hydrochlorothiazide (HYDRODIURIL) 25 mg tablet   Yes No   Sig: Take 25 mg by mouth   levothyroxine 25 mcg tablet   Yes No   Sig: TAKE ONE TABLET BY MOUTH DAILY HE 1 TABLETA POR VIA ORAL DIARIAMENTE   lidocaine (LIDODERM) 5 %   Yes No   Sig: APPLY 1 PATCH BY TOPICAL ROUTE ONCE DAILY (MAY WEAR UP TO 12HOURS.)   lidocaine (Lidoderm) 5 %   No No   Sig: Apply 1 patch topically over 12 hours daily Remove & Discard patch within 12 hours or as directed by MD   losartan (COZAAR) 50 mg tablet   Yes No   Sig: TAKE 1 TABLET EVERY DAY BY ORAL ROUTE FOR 90 DAYS.   metFORMIN (GLUCOPHAGE) 500 mg tablet   Yes No   Sig: TAKE 1 TABLET EVERY 12 HOURS BY ORAL ROUTE FOR 90 DAYS. TOME 1 TABLET CADA 12 HOURS BY ORAL ROUTE PARA 90 DAYS.   omega-3-acid ethyl esters (LOVAZA) 1 g capsule   Yes No   Sig: TAKE 2 CAPSULE(S) TWICE A DAY BY ORAL ROUTE FOR 90 DAYS.   omeprazole (PriLOSEC) 20 mg delayed release capsule   Yes No   Sig: Take 20 mg by mouth daily   sertraline (ZOLOFT) 50 mg tablet   Yes No   Sig: TAKE 1  TABLET EVERY DAY BY ORAL ROUTE.      Facility-Administered Medications: None     Discharge Medication List as of 1/21/2025  4:32 PM        START taking these medications    Details   cephalexin (KEFLEX) 500 mg capsule Take 1 capsule (500 mg total) by mouth every 12 (twelve) hours for 7 days, Starting Tue 1/21/2025, Until Tue 1/28/2025, Normal           CONTINUE these medications which have NOT CHANGED    Details   amLODIPine (NORVASC) 5 mg tablet TAKE 1 TABLET EVERY DAY BY ORAL ROUTE, Historical Med      Aspirin 81 MG CAPS Take 81 mg by mouth daily, Historical Med      atorvastatin (LIPITOR) 80 mg tablet Take 80 mg by mouth, Historical Med      benzonatate (TESSALON PERLES) 100 mg capsule Take 1 capsule (100 mg total) by mouth every 8 (eight) hours, Starting Fri 5/19/2023, Normal      Blood Pressure Monitoring ([place] [facility] BP Monitor/Arm) TARIK USE AS DIRECTED. USE AS DIRECTED., Historical Med      cholecalciferol (VITAMIN D3) 1,000 units tablet TAKE 1 TABLET(S) EVERY DAY BY ORAL ROUTE FOR 90 DAYS., Historical Med      ciclopirox (PENLAC) 8 % solution APPLY TO THE AFFECTED AREA(S) BY TOPICAL ROUTE ONCE DAILY PREFERABLY AT BEDTIME OR 8 HOURS BEFORE WASHING, Historical Med      Diclofenac Sodium (VOLTAREN) 1 % Apply to left shoulder region 4 times aday., Print      docusate sodium (COLACE) 100 mg capsule Take 1 capsule (100 mg total) by mouth every 12 (twelve) hours, Starting Sun 7/3/2022, Normal      donepezil (ARICEPT) 10 mg tablet TAKE ONE TABLET EACH EVENING AT BEDTIME FOR MEMORY, Historical Med      ezetimibe (ZETIA) 10 mg tablet Take 10 mg by mouth daily, Historical Med      famotidine (PEPCID) 40 MG tablet TAKE ONE TABLET BY MOUTH DAILY HE 1 TABLETA POR VIA ORAL DIARIAMENTE, Historical Med      fluconazole (DIFLUCAN) 150 mg tablet TAKE 1 TABLET BY MOUTH TOME 1 TABLET BY MOUTH, Historical Med      Global Inject Ease Lancets 30G MISC TEST 2-3 TIMES A DAY AS DIRECTED TEST 2-3 TIMES A DAY AS DIRECTED,  Historical Med      GNP Alcohol Swabs 70 % PADS TEST 2-3 TIMES A DAY AS DIRECTED TEST 2-3 TIMES A DAY AS DIRECTED, Historical Med      hydrochlorothiazide (HYDRODIURIL) 25 mg tablet Take 25 mg by mouth, Historical Med      levothyroxine 25 mcg tablet TAKE ONE TABLET BY MOUTH DAILY HE 1 TABLETA POR VIA ORAL DIARIAMENTE, Historical Med      !! lidocaine (LIDODERM) 5 % APPLY 1 PATCH BY TOPICAL ROUTE ONCE DAILY (MAY WEAR UP TO 12HOURS.), Historical Med      !! lidocaine (Lidoderm) 5 % Apply 1 patch topically over 12 hours daily Remove & Discard patch within 12 hours or as directed by MD, Starting Sun 11/26/2023, Normal      losartan (COZAAR) 50 mg tablet TAKE 1 TABLET EVERY DAY BY ORAL ROUTE FOR 90 DAYS., Historical Med      metFORMIN (GLUCOPHAGE) 500 mg tablet TAKE 1 TABLET EVERY 12 HOURS BY ORAL ROUTE FOR 90 DAYS. TOME 1 TABLET CADA 12 HOURS BY ORAL ROUTE PARA 90 DAYS., Historical Med      omega-3-acid ethyl esters (LOVAZA) 1 g capsule TAKE 2 CAPSULE(S) TWICE A DAY BY ORAL ROUTE FOR 90 DAYS., Historical Med      omeprazole (PriLOSEC) 20 mg delayed release capsule Take 20 mg by mouth daily, Starting Tue 1/24/2023, Historical Med      OneTouch Ultra test strip USE AS DIRECTED. USE AS DIRECTED., Historical Med      Senna-Tabs 8.6 MG tablet TAKE 2 TABLETS EVERY DAY BY ORAL ROUTE., Historical Med      sertraline (ZOLOFT) 50 mg tablet TAKE 1 TABLET EVERY DAY BY ORAL ROUTE., Historical Med       !! - Potential duplicate medications found. Please discuss with provider.        No discharge procedures on file.  ED SEPSIS DOCUMENTATION   Time reflects when diagnosis was documented in both MDM as applicable and the Disposition within this note       Time User Action Codes Description Comment    1/21/2025  4:29 PM Gi Hsieh Add [N39.0] UTI (urinary tract infection)                  Gi Hsieh PA-C  01/21/25 1905

## 2025-01-23 ENCOUNTER — RESULTS FOLLOW-UP (OUTPATIENT)
Dept: EMERGENCY DEPT | Facility: HOSPITAL | Age: 85
End: 2025-01-23

## 2025-01-23 LAB — BACTERIA UR CULT: ABNORMAL
